# Patient Record
Sex: FEMALE | Race: WHITE | Employment: OTHER | URBAN - METROPOLITAN AREA
[De-identification: names, ages, dates, MRNs, and addresses within clinical notes are randomized per-mention and may not be internally consistent; named-entity substitution may affect disease eponyms.]

---

## 2019-12-07 ENCOUNTER — HOSPITAL ENCOUNTER (OUTPATIENT)
Age: 79
Discharge: HOME | End: 2019-12-07
Payer: MEDICARE

## 2019-12-07 LAB
ALBUMIN SERPL-MCNC: 4 G/DL (ref 3.5–5.2)
ALP BLD-CCNC: 76 U/L (ref 35–104)
ALT SERPL-CCNC: 10 U/L (ref 0–32)
ANION GAP SERPL CALCULATED.3IONS-SCNC: 12 MMOL/L (ref 7–16)
AST SERPL-CCNC: 18 U/L (ref 0–31)
BACTERIA: ABNORMAL /HPF
BASOPHILS ABSOLUTE: 0.03 E9/L (ref 0–0.2)
BASOPHILS RELATIVE PERCENT: 0.4 % (ref 0–2)
BILIRUB SERPL-MCNC: 0.3 MG/DL (ref 0–1.2)
BILIRUBIN URINE: NEGATIVE
BLOOD, URINE: ABNORMAL
BUN BLDV-MCNC: 16 MG/DL (ref 8–23)
CALCIUM SERPL-MCNC: 9.2 MG/DL (ref 8.6–10.2)
CHLORIDE BLD-SCNC: 101 MMOL/L (ref 98–107)
CHOLESTEROL, TOTAL: 167 MG/DL (ref 0–199)
CLARITY: CLEAR
CO2: 27 MMOL/L (ref 22–29)
COLOR: YELLOW
CREAT SERPL-MCNC: 0.7 MG/DL (ref 0.5–1)
EOSINOPHILS ABSOLUTE: 0.16 E9/L (ref 0.05–0.5)
EOSINOPHILS RELATIVE PERCENT: 2.1 % (ref 0–6)
EPITHELIAL CELLS, UA: ABNORMAL /HPF
GFR AFRICAN AMERICAN: >60
GFR NON-AFRICAN AMERICAN: >60 ML/MIN/1.73
GLUCOSE BLD-MCNC: 100 MG/DL (ref 74–99)
GLUCOSE URINE: NEGATIVE MG/DL
HBA1C MFR BLD: 6 % (ref 4–5.6)
HCT VFR BLD CALC: 39.9 % (ref 34–48)
HDLC SERPL-MCNC: 82 MG/DL
HEMOGLOBIN: 12.9 G/DL (ref 11.5–15.5)
IMMATURE GRANULOCYTES #: 0.04 E9/L
IMMATURE GRANULOCYTES %: 0.5 % (ref 0–5)
KETONES, URINE: NEGATIVE MG/DL
LDL CHOLESTEROL CALCULATED: 72 MG/DL (ref 0–99)
LEUKOCYTE ESTERASE, URINE: NEGATIVE
LYMPHOCYTES ABSOLUTE: 2.3 E9/L (ref 1.5–4)
LYMPHOCYTES RELATIVE PERCENT: 30.7 % (ref 20–42)
MCH RBC QN AUTO: 30.1 PG (ref 26–35)
MCHC RBC AUTO-ENTMCNC: 32.3 % (ref 32–34.5)
MCV RBC AUTO: 93.2 FL (ref 80–99.9)
MONOCYTES ABSOLUTE: 0.56 E9/L (ref 0.1–0.95)
MONOCYTES RELATIVE PERCENT: 7.5 % (ref 2–12)
NEUTROPHILS ABSOLUTE: 4.4 E9/L (ref 1.8–7.3)
NEUTROPHILS RELATIVE PERCENT: 58.8 % (ref 43–80)
NITRITE, URINE: NEGATIVE
PDW BLD-RTO: 12.6 FL (ref 11.5–15)
PH UA: 6 (ref 5–9)
PLATELET # BLD: 319 E9/L (ref 130–450)
PMV BLD AUTO: 10 FL (ref 7–12)
POTASSIUM SERPL-SCNC: 4.1 MMOL/L (ref 3.5–5)
PROTEIN UA: NEGATIVE MG/DL
RBC # BLD: 4.28 E12/L (ref 3.5–5.5)
RBC UA: ABNORMAL /HPF (ref 0–2)
SODIUM BLD-SCNC: 140 MMOL/L (ref 132–146)
SPECIFIC GRAVITY UA: 1.01 (ref 1–1.03)
TOTAL PROTEIN: 7.4 G/DL (ref 6.4–8.3)
TRIGL SERPL-MCNC: 65 MG/DL (ref 0–149)
TSH SERPL DL<=0.05 MIU/L-ACNC: 2.04 UIU/ML (ref 0.27–4.2)
UROBILINOGEN, URINE: 0.2 E.U./DL
VITAMIN D 25-HYDROXY: 71 NG/ML (ref 30–100)
VLDLC SERPL CALC-MCNC: 13 MG/DL
WBC # BLD: 7.5 E9/L (ref 4.5–11.5)
WBC UA: ABNORMAL /HPF (ref 0–5)

## 2019-12-07 PROCEDURE — 84443 ASSAY THYROID STIM HORMONE: CPT

## 2019-12-07 PROCEDURE — 80061 LIPID PANEL: CPT

## 2019-12-07 PROCEDURE — 85025 COMPLETE CBC W/AUTO DIFF WBC: CPT

## 2019-12-07 PROCEDURE — 82306 VITAMIN D 25 HYDROXY: CPT

## 2019-12-07 PROCEDURE — 36415 COLL VENOUS BLD VENIPUNCTURE: CPT

## 2019-12-07 PROCEDURE — 81001 URINALYSIS AUTO W/SCOPE: CPT

## 2019-12-07 PROCEDURE — 83036 HEMOGLOBIN GLYCOSYLATED A1C: CPT

## 2019-12-07 PROCEDURE — 80053 COMPREHEN METABOLIC PANEL: CPT

## 2020-11-05 ENCOUNTER — HOSPITAL ENCOUNTER (OUTPATIENT)
Age: 80
Discharge: HOME | End: 2020-11-05
Payer: MEDICARE

## 2020-11-05 LAB
ALBUMIN SERPL-MCNC: 3.8 G/DL (ref 3.5–5.2)
ALP BLD-CCNC: 82 U/L (ref 35–104)
ALT SERPL-CCNC: 11 U/L (ref 0–32)
ANION GAP SERPL CALCULATED.3IONS-SCNC: 10 MMOL/L (ref 7–16)
AST SERPL-CCNC: 21 U/L (ref 0–31)
BASOPHILS ABSOLUTE: 0.04 E9/L (ref 0–0.2)
BASOPHILS RELATIVE PERCENT: 0.6 % (ref 0–2)
BILIRUB SERPL-MCNC: 0.5 MG/DL (ref 0–1.2)
BILIRUBIN URINE: NEGATIVE
BLOOD, URINE: NEGATIVE
BUN BLDV-MCNC: 14 MG/DL (ref 8–23)
CALCIUM SERPL-MCNC: 9.2 MG/DL (ref 8.6–10.2)
CHLORIDE BLD-SCNC: 104 MMOL/L (ref 98–107)
CLARITY: CLEAR
CO2: 27 MMOL/L (ref 22–29)
COLOR: YELLOW
CREAT SERPL-MCNC: 0.6 MG/DL (ref 0.5–1)
EOSINOPHILS ABSOLUTE: 0.16 E9/L (ref 0.05–0.5)
EOSINOPHILS RELATIVE PERCENT: 2.2 % (ref 0–6)
GFR AFRICAN AMERICAN: >60
GFR NON-AFRICAN AMERICAN: >60 ML/MIN/1.73
GLUCOSE FASTING: 121 MG/DL (ref 74–99)
GLUCOSE URINE: NEGATIVE MG/DL
HBA1C MFR BLD: 6.3 % (ref 4–5.6)
HCT VFR BLD CALC: 44.1 % (ref 34–48)
HEMOGLOBIN: 14.3 G/DL (ref 11.5–15.5)
IMMATURE GRANULOCYTES #: 0.03 E9/L
IMMATURE GRANULOCYTES %: 0.4 % (ref 0–5)
KETONES, URINE: NEGATIVE MG/DL
LEUKOCYTE ESTERASE, URINE: NEGATIVE
LYMPHOCYTES ABSOLUTE: 2.5 E9/L (ref 1.5–4)
LYMPHOCYTES RELATIVE PERCENT: 34.6 % (ref 20–42)
MCH RBC QN AUTO: 29.9 PG (ref 26–35)
MCHC RBC AUTO-ENTMCNC: 32.4 % (ref 32–34.5)
MCV RBC AUTO: 92.1 FL (ref 80–99.9)
MONOCYTES ABSOLUTE: 0.55 E9/L (ref 0.1–0.95)
MONOCYTES RELATIVE PERCENT: 7.6 % (ref 2–12)
NEUTROPHILS ABSOLUTE: 3.94 E9/L (ref 1.8–7.3)
NEUTROPHILS RELATIVE PERCENT: 54.6 % (ref 43–80)
NITRITE, URINE: NEGATIVE
PDW BLD-RTO: 12.4 FL (ref 11.5–15)
PH UA: 6 (ref 5–9)
PLATELET # BLD: 327 E9/L (ref 130–450)
PMV BLD AUTO: 10.1 FL (ref 7–12)
POTASSIUM SERPL-SCNC: 4.1 MMOL/L (ref 3.5–5)
PROTEIN UA: NEGATIVE MG/DL
RBC # BLD: 4.79 E12/L (ref 3.5–5.5)
SODIUM BLD-SCNC: 141 MMOL/L (ref 132–146)
SPECIFIC GRAVITY UA: 1.02 (ref 1–1.03)
TOTAL PROTEIN: 7.6 G/DL (ref 6.4–8.3)
TSH SERPL DL<=0.05 MIU/L-ACNC: 1.84 UIU/ML (ref 0.27–4.2)
UROBILINOGEN, URINE: 0.2 E.U./DL
WBC # BLD: 7.2 E9/L (ref 4.5–11.5)

## 2020-11-05 PROCEDURE — 81003 URINALYSIS AUTO W/O SCOPE: CPT

## 2020-11-05 PROCEDURE — 84443 ASSAY THYROID STIM HORMONE: CPT

## 2020-11-05 PROCEDURE — 85025 COMPLETE CBC W/AUTO DIFF WBC: CPT

## 2020-11-05 PROCEDURE — 80053 COMPREHEN METABOLIC PANEL: CPT

## 2020-11-05 PROCEDURE — 83036 HEMOGLOBIN GLYCOSYLATED A1C: CPT

## 2020-11-05 PROCEDURE — 36415 COLL VENOUS BLD VENIPUNCTURE: CPT

## 2020-11-20 ENCOUNTER — HOSPITAL ENCOUNTER (OUTPATIENT)
Dept: ULTRASOUND IMAGING | Age: 80
Discharge: HOME | End: 2020-11-20
Payer: MEDICARE

## 2020-11-20 ENCOUNTER — HOSPITAL ENCOUNTER (OUTPATIENT)
Dept: CT IMAGING | Age: 80
Discharge: HOME | End: 2020-11-20
Payer: MEDICARE

## 2020-11-20 PROCEDURE — 93880 EXTRACRANIAL BILAT STUDY: CPT

## 2020-11-20 PROCEDURE — 70450 CT HEAD/BRAIN W/O DYE: CPT

## 2020-12-11 ENCOUNTER — HOSPITAL ENCOUNTER (OUTPATIENT)
Dept: CT IMAGING | Age: 80
Discharge: HOME | End: 2020-12-11
Payer: MEDICARE

## 2020-12-16 ENCOUNTER — HOSPITAL ENCOUNTER (OUTPATIENT)
Age: 80
Discharge: HOME | End: 2020-12-18
Payer: MEDICARE

## 2020-12-16 PROCEDURE — U0003 INFECTIOUS AGENT DETECTION BY NUCLEIC ACID (DNA OR RNA); SEVERE ACUTE RESPIRATORY SYNDROME CORONAVIRUS 2 (SARS-COV-2) (CORONAVIRUS DISEASE [COVID-19]), AMPLIFIED PROBE TECHNIQUE, MAKING USE OF HIGH THROUGHPUT TECHNOLOGIES AS DESCRIBED BY CMS-2020-01-R: HCPCS

## 2020-12-18 LAB
SARS-COV-2: NOT DETECTED
SOURCE: NORMAL

## 2020-12-21 ENCOUNTER — HOSPITAL ENCOUNTER (OUTPATIENT)
Dept: CT IMAGING | Age: 80
Discharge: HOME | End: 2020-12-21
Payer: MEDICARE

## 2020-12-21 ENCOUNTER — HOSPITAL ENCOUNTER (OUTPATIENT)
Dept: CT IMAGING | Age: 80
End: 2020-12-21
Payer: MEDICARE

## 2020-12-21 ENCOUNTER — HOSPITAL ENCOUNTER (OUTPATIENT)
Dept: INTERVENTIONAL RADIOLOGY/VASCULAR | Age: 80
Discharge: HOME | End: 2020-12-21
Payer: MEDICARE

## 2020-12-21 LAB
INR BLD: NORMAL
PROTIME: NORMAL

## 2020-12-21 PROCEDURE — 70496 CT ANGIOGRAPHY HEAD: CPT

## 2020-12-21 PROCEDURE — 6360000004 HC RX CONTRAST MEDICATION: Performed by: RADIOLOGY

## 2020-12-21 PROCEDURE — 70498 CT ANGIOGRAPHY NECK: CPT

## 2020-12-21 PROCEDURE — 2500000003 HC RX 250 WO HCPCS: Performed by: RADIOLOGY

## 2020-12-21 PROCEDURE — C1894 INTRO/SHEATH, NON-LASER: HCPCS

## 2020-12-21 PROCEDURE — 36573 INSJ PICC RS&I 5 YR+: CPT | Performed by: RADIOLOGY

## 2020-12-21 RX ORDER — LIDOCAINE HYDROCHLORIDE 10 MG/ML
10 INJECTION, SOLUTION INFILTRATION; PERINEURAL ONCE
Status: DISCONTINUED | OUTPATIENT
Start: 2020-12-21 | End: 2020-12-21 | Stop reason: ALTCHOICE

## 2020-12-21 RX ADMIN — IOPAMIDOL 75 ML: 755 INJECTION, SOLUTION INTRAVENOUS at 14:21

## 2020-12-21 RX ADMIN — LIDOCAINE HYDROCHLORIDE 10 ML: 10 INJECTION, SOLUTION INFILTRATION; PERINEURAL at 13:50

## 2020-12-21 ASSESSMENT — PAIN SCALES - GENERAL: PAINLEVEL_OUTOF10: 5

## 2020-12-22 LAB — DIABETIC RETINOPATHY: NEGATIVE

## 2021-01-11 ENCOUNTER — PREP FOR PROCEDURE (OUTPATIENT)
Dept: VASCULAR SURGERY | Age: 81
End: 2021-01-11

## 2021-01-11 DIAGNOSIS — I65.21 CAROTID STENOSIS, ASYMPTOMATIC, RIGHT: Primary | ICD-10-CM

## 2021-01-11 RX ORDER — SODIUM CHLORIDE 9 MG/ML
INJECTION, SOLUTION INTRAVENOUS CONTINUOUS
Status: CANCELLED | OUTPATIENT
Start: 2021-01-11

## 2021-01-11 RX ORDER — SODIUM CHLORIDE 0.9 % (FLUSH) 0.9 %
10 SYRINGE (ML) INJECTION PRN
Status: CANCELLED | OUTPATIENT
Start: 2021-01-11

## 2021-01-11 RX ORDER — SODIUM CHLORIDE 0.9 % (FLUSH) 0.9 %
10 SYRINGE (ML) INJECTION EVERY 12 HOURS SCHEDULED
Status: CANCELLED | OUTPATIENT
Start: 2021-01-11

## 2021-01-15 ENCOUNTER — PREP FOR PROCEDURE (OUTPATIENT)
Dept: VASCULAR SURGERY | Age: 81
End: 2021-01-15

## 2021-01-18 ENCOUNTER — HOSPITAL ENCOUNTER (OUTPATIENT)
Dept: PREADMISSION TESTING | Age: 81
Discharge: HOME | End: 2021-01-18
Payer: MEDICARE

## 2021-01-18 ENCOUNTER — HOSPITAL ENCOUNTER (OUTPATIENT)
Age: 81
Discharge: HOME | End: 2021-01-20
Payer: MEDICARE

## 2021-01-18 ENCOUNTER — HOSPITAL ENCOUNTER (OUTPATIENT)
Dept: GENERAL RADIOLOGY | Age: 81
Discharge: HOME | End: 2021-01-20
Payer: MEDICARE

## 2021-01-18 VITALS
OXYGEN SATURATION: 97 % | TEMPERATURE: 97 F | SYSTOLIC BLOOD PRESSURE: 171 MMHG | BODY MASS INDEX: 35.89 KG/M2 | WEIGHT: 195.02 LBS | DIASTOLIC BLOOD PRESSURE: 72 MMHG | HEIGHT: 62 IN | HEART RATE: 70 BPM | RESPIRATION RATE: 16 BRPM

## 2021-01-18 DIAGNOSIS — I65.21 CAROTID STENOSIS, ASYMPTOMATIC, RIGHT: ICD-10-CM

## 2021-01-18 LAB
ALBUMIN SERPL-MCNC: 3.8 G/DL (ref 3.5–5.2)
ALP BLD-CCNC: 91 U/L (ref 35–104)
ALT SERPL-CCNC: 12 U/L (ref 0–32)
ANION GAP SERPL CALCULATED.3IONS-SCNC: 9 MMOL/L (ref 7–16)
APTT: 30.3 SEC (ref 24.5–35.1)
AST SERPL-CCNC: 20 U/L (ref 0–31)
BILIRUB SERPL-MCNC: 0.3 MG/DL (ref 0–1.2)
BUN BLDV-MCNC: 14 MG/DL (ref 8–23)
CALCIUM SERPL-MCNC: 9.3 MG/DL (ref 8.6–10.2)
CHLORIDE BLD-SCNC: 105 MMOL/L (ref 98–107)
CO2: 25 MMOL/L (ref 22–29)
CREAT SERPL-MCNC: 0.7 MG/DL (ref 0.5–1)
GFR AFRICAN AMERICAN: >60
GFR NON-AFRICAN AMERICAN: >60 ML/MIN/1.73
GLUCOSE BLD-MCNC: 108 MG/DL (ref 74–99)
HCT VFR BLD CALC: 41.4 % (ref 34–48)
HEMOGLOBIN: 13.6 G/DL (ref 11.5–15.5)
INR BLD: 1
MCH RBC QN AUTO: 30.7 PG (ref 26–35)
MCHC RBC AUTO-ENTMCNC: 32.9 % (ref 32–34.5)
MCV RBC AUTO: 93.5 FL (ref 80–99.9)
PDW BLD-RTO: 12.1 FL (ref 11.5–15)
PLATELET # BLD: 312 E9/L (ref 130–450)
PMV BLD AUTO: 10.3 FL (ref 7–12)
POTASSIUM SERPL-SCNC: 4.5 MMOL/L (ref 3.5–5)
PROTHROMBIN TIME: 11.2 SEC (ref 9.3–12.4)
RBC # BLD: 4.43 E12/L (ref 3.5–5.5)
SODIUM BLD-SCNC: 139 MMOL/L (ref 132–146)
TOTAL PROTEIN: 7.3 G/DL (ref 6.4–8.3)
WBC # BLD: 7.5 E9/L (ref 4.5–11.5)

## 2021-01-18 PROCEDURE — 71046 X-RAY EXAM CHEST 2 VIEWS: CPT

## 2021-01-18 PROCEDURE — 80053 COMPREHEN METABOLIC PANEL: CPT

## 2021-01-18 PROCEDURE — 85610 PROTHROMBIN TIME: CPT

## 2021-01-18 PROCEDURE — 85027 COMPLETE CBC AUTOMATED: CPT

## 2021-01-18 PROCEDURE — 36415 COLL VENOUS BLD VENIPUNCTURE: CPT

## 2021-01-18 PROCEDURE — U0003 INFECTIOUS AGENT DETECTION BY NUCLEIC ACID (DNA OR RNA); SEVERE ACUTE RESPIRATORY SYNDROME CORONAVIRUS 2 (SARS-COV-2) (CORONAVIRUS DISEASE [COVID-19]), AMPLIFIED PROBE TECHNIQUE, MAKING USE OF HIGH THROUGHPUT TECHNOLOGIES AS DESCRIBED BY CMS-2020-01-R: HCPCS

## 2021-01-18 PROCEDURE — 85730 THROMBOPLASTIN TIME PARTIAL: CPT

## 2021-01-18 RX ORDER — ASPIRIN 81 MG/1
81 TABLET ORAL DAILY
COMMUNITY

## 2021-01-18 RX ORDER — CLOPIDOGREL BISULFATE 75 MG/1
75 TABLET ORAL DAILY
COMMUNITY
End: 2021-04-27

## 2021-01-18 NOTE — PROGRESS NOTES
[address] [facility]                                                                                                                    PRE OP INSTRUCTIONS FOR  Ludwin Nancy FARRAH Coley        Date: 1/18/2021    Date of surgery: 1/22/21   Arrival Time: 7:30 AM    1. Do not eat or drink anything after midnight prior to surgery. This includes no water, chewing gum, mints or ice chips. 2. Take the following medications with a small sip of water on the morning of Surgery: plavix and aspirin. No BP pill, no supplements or vitamins. 3. Diabetics may take evening dose of insulin but none after midnight. If you feel symptomatic or low blood sugar morning of surgery drink 1-2 ounces of apple juice only. 4. Aspirin, Ibuprofen, Advil, Naproxen, Vitamin E and other Anti-inflammatory products should be stopped  before surgery  as directed by your physician. Take Tylenol only unless instructed otherwise by your surgeon. 5. Check with your Doctor regarding stopping Plavix, Coumadin, Lovenox, Eliquis, Effient, or other blood thinners. 6. Do not smoke,use illicit drugs and do not drink any alcoholic beverages 24 hours prior to surgery. 7. You may brush your teeth the morning of surgery. DO NOT SWALLOW WATER    8. You MUST make arrangements for a responsible adult to take you home after your surgery. You will not be allowed to leave alone or drive yourself home. It is strongly suggested someone stay with you the first 24 hrs. Your surgery will be cancelled if you do not have a ride home. 9. PEDIATRIC PATIENTS ONLY:  A parent/legal guardian must accompany a child scheduled for surgery and plan to stay at the hospital until the child is discharged. Please do not bring other children with you.     10. Please wear simple, loose fitting clothing to the hospital.  Reather Dixons not bring valuables (money, credit cards, checkbooks, etc.) Do not wear any makeup (including no eye makeup) or nail polish on your fingers or toes. 11. DO NOT wear any jewelry or piercings on day of surgery. All body piercing jewelry must be removed. 12. Shower the night before surgery with _x__Antibacterial soap /FIDELIA WIPES________. No creams,lotions or powders. May use deodorant. 13. TOTAL JOINT REPLACEMENT/HYSTERECTOMY PATIENTS ONLY---Remember to bring Blood Bank bracelet to the hospital on the day of surgery. 14. If you have a Living Will and Durable Power of  for Healthcare, please bring in a copy. 15. If appropriate bring crutches, inspirex, WALKER, CANE etc... 12. Notify your Surgeon if you develop any illness between now and surgery time, cough, cold, fever, sore throat, nausea, vomiting, etc.  Please notify your surgeon if you experience dizziness, shortness of breath or blurred vision between now & the time of your surgery. 17. If you have __x_dentures, they will be removed before going to the OR; we will provide you a container. If you wear ___contact lenses or ___glasses, they will be removed; please bring a case for them. 18. To provide excellent care visitors will be limited to 1 in the room at any given time. 19. Please bring picture ID and insurance card. 20. Sleep apnea patients need to bring CPAP AND SETTINGS to hospital on day of surgery. 21. During flu season no children under the age of 15 are permitted in the hospital for the safety of all patients. 22. Other Please check in at the information desk. Wear a mask. Please call AMBULATORY CARE if you have any further questions.    [address] [facility]     [address]

## 2021-01-19 LAB
SARS-COV-2: NOT DETECTED
SOURCE: NORMAL

## 2021-01-21 ENCOUNTER — HOSPITAL ENCOUNTER (OUTPATIENT)
Dept: INFUSION THERAPY | Age: 81
Setting detail: INFUSION SERIES
Discharge: HOME | End: 2021-01-21
Payer: MEDICARE

## 2021-01-21 PROCEDURE — 36410 VNPNXR 3YR/> PHY/QHP DX/THER: CPT

## 2021-01-21 PROCEDURE — 76937 US GUIDE VASCULAR ACCESS: CPT

## 2021-01-21 PROCEDURE — C1751 CATH, INF, PER/CENT/MIDLINE: HCPCS

## 2021-01-21 NOTE — H&P
Coral Coley  1/21/2021      H&P    CHIEF COMPLAINT:  CAROTID STENOSIS  HISTORY OF PRESENT ILLNESS:  Glenn Coley is a 78 y.o.  female     Weight:  186. Past Medical History: She has a past medical history of Cancer ([address] Hx of radiation therapy, Hyperlipidemia, Hypertension, and PONV (postoperative nausea and vomiting). Past Surgical History: She has a past surgical history that includes amputation (Left, 1992); Breast surgery (Right, 2004); Colonoscopy; and Hysterectomy. Allergies: Neosporin [neomycin-polymyxin-gramicidin]    Home Medicines:   Prior to Visit Medications    Medication Sig Taking? Authorizing Provider   clopidogrel (PLAVIX) 75 MG tablet Take 75 mg by mouth daily  Historical Provider, MD   aspirin 81 MG EC tablet Take 81 mg by mouth daily  Historical Provider, MD   BIOTIN PO Take by mouth daily  Historical Provider, MD   LUTEIN PO Take by mouth daily  Historical Provider, MD   MULTIPLE VITAMINS PO Take by mouth daily  Historical Provider, MD   VITAMIN D PO Take by mouth daily  Historical Provider, MD   simvastatin (ZOCOR) 20 MG tablet Take 20 mg by mouth nightly  Historical Provider, MD   lisinopril (PRINIVIL;ZESTRIL) 10 MG tablet Take 10 mg by mouth daily  Historical Provider, MD       Social History:   TOBACCO:   reports that she quit smoking about 39 years ago. She has never used smokeless tobacco.  ETOH:    reports current alcohol use. No family history on file. REVIEW OF SYSTEMS:  Constitutional: negative  Respiratory: negative  Cardiovascular: negative  Gastrointestinal: negative  Musculoskeletal:negative  Behavioral/Psych: negative  All others reviewed, negative    No results for input(s): WBC, HGB, PLT, NA, CL, K, BUN, CREATININE, GLUCOSE, PROTIME, INR, LABALBU, PROT, CALCIUM, MG, PHOS, BILITOT, BILIDIR, LDH, ALKPHOS, AST, ALT in the last 72 hours. Physical exam:   Constitutional: She is oriented to person, place, and time.  She appears well-developed and well-nourished. HENT:   Head: Normocephalic and atraumatic. Right Ear: Hearing and external ear normal.   Left Ear: Hearing and external ear normal.   Nose: Nose normal.   Mouth/Throat: Uvula is midline, oropharynx is clear and moist and mucous membranes are normal.   Eyes: Conjunctivae, EOM and lids are normal. Pupils are equal, round, and reactive to light. Neck: Normal range of motion. Neck supple. Cardiovascular: Normal rate, regular rhythm and normal heart sounds. No murmur heard. Pulmonary/Chest: Effort normal and breath sounds normal.   Abdominal: Soft. Bowel sounds are normal. There is no tenderness. There is no rigidity, no rebound, no guarding and no CVA tenderness. Musculoskeletal: She exhibits no edema. Left bka  Tip of stump has some darker pink skin but no erythema or increased warmth. No red streak. No swelling. No draining wound. Neurological: She is alert and oriented to person, place, and time. She has normal strength. No cranial nerve deficit or sensory deficit. Coordination normal. GCS eye subscore is 4. GCS verbal subscore is 5. GCS motor subscore is 6. Skin: Skin is warm and dry. No abrasion and no rash noted. a    ASSESSMENT: CAROTID STENOSIS    PLAN: RIGHT CAROTID ANGIOGRAM STENT    Signed: Dr. Jayden Bethea M.D.

## 2021-01-22 ENCOUNTER — HOSPITAL ENCOUNTER (OUTPATIENT)
Dept: INTERVENTIONAL RADIOLOGY/VASCULAR | Age: 81
Setting detail: OBSERVATION
Discharge: HOME | End: 2021-01-23
Attending: THORACIC SURGERY (CARDIOTHORACIC VASCULAR SURGERY) | Admitting: THORACIC SURGERY (CARDIOTHORACIC VASCULAR SURGERY)
Payer: MEDICARE

## 2021-01-22 DIAGNOSIS — I65.29 STENOSIS OF CAROTID ARTERY, UNSPECIFIED LATERALITY: ICD-10-CM

## 2021-01-22 DIAGNOSIS — I65.21 CAROTID STENOSIS, SYMPTOMATIC W/O INFARCT, RIGHT: ICD-10-CM

## 2021-01-22 LAB
POC ACT LR: 200 SECONDS
POC ACT LR: 275 SECONDS

## 2021-01-22 PROCEDURE — G0269 OCCLUSIVE DEVICE IN VEIN ART: HCPCS | Performed by: THORACIC SURGERY (CARDIOTHORACIC VASCULAR SURGERY)

## 2021-01-22 PROCEDURE — 6360000002 HC RX W HCPCS: Performed by: THORACIC SURGERY (CARDIOTHORACIC VASCULAR SURGERY)

## 2021-01-22 PROCEDURE — 6370000000 HC RX 637 (ALT 250 FOR IP): Performed by: THORACIC SURGERY (CARDIOTHORACIC VASCULAR SURGERY)

## 2021-01-22 PROCEDURE — 2580000003 HC RX 258: Performed by: THORACIC SURGERY (CARDIOTHORACIC VASCULAR SURGERY)

## 2021-01-22 PROCEDURE — 37215 TRANSCATH STENT CCA W/EPS: CPT | Performed by: THORACIC SURGERY (CARDIOTHORACIC VASCULAR SURGERY)

## 2021-01-22 PROCEDURE — C1769 GUIDE WIRE: HCPCS

## 2021-01-22 PROCEDURE — G0379 DIRECT REFER HOSPITAL OBSERV: HCPCS

## 2021-01-22 PROCEDURE — 6360000004 HC RX CONTRAST MEDICATION: Performed by: THORACIC SURGERY (CARDIOTHORACIC VASCULAR SURGERY)

## 2021-01-22 PROCEDURE — G0378 HOSPITAL OBSERVATION PER HR: HCPCS

## 2021-01-22 PROCEDURE — 96374 THER/PROPH/DIAG INJ IV PUSH: CPT

## 2021-01-22 PROCEDURE — 85347 COAGULATION TIME ACTIVATED: CPT

## 2021-01-22 RX ORDER — SODIUM CHLORIDE 0.9 % (FLUSH) 0.9 %
10 SYRINGE (ML) INJECTION EVERY 12 HOURS SCHEDULED
Status: DISCONTINUED | OUTPATIENT
Start: 2021-01-22 | End: 2021-01-22

## 2021-01-22 RX ORDER — HEPARIN SODIUM 5000 [USP'U]/ML
INJECTION, SOLUTION INTRAVENOUS; SUBCUTANEOUS
Status: COMPLETED | OUTPATIENT
Start: 2021-01-22 | End: 2021-01-22

## 2021-01-22 RX ORDER — IODIXANOL 320 MG/ML
70 INJECTION, SOLUTION INTRAVASCULAR
Status: COMPLETED | OUTPATIENT
Start: 2021-01-22 | End: 2021-01-22

## 2021-01-22 RX ORDER — HEPARIN SODIUM (PORCINE) LOCK FLUSH IV SOLN 100 UNIT/ML 100 UNIT/ML
1 SOLUTION INTRAVENOUS PRN
Status: DISCONTINUED | OUTPATIENT
Start: 2021-01-22 | End: 2021-01-23 | Stop reason: HOSPADM

## 2021-01-22 RX ORDER — HEPARIN SODIUM (PORCINE) LOCK FLUSH IV SOLN 100 UNIT/ML 100 UNIT/ML
1 SOLUTION INTRAVENOUS EVERY 12 HOURS SCHEDULED
Status: DISCONTINUED | OUTPATIENT
Start: 2021-01-22 | End: 2021-01-23 | Stop reason: HOSPADM

## 2021-01-22 RX ORDER — SODIUM CHLORIDE 9 MG/ML
INJECTION, SOLUTION INTRAVENOUS CONTINUOUS
Status: DISCONTINUED | OUTPATIENT
Start: 2021-01-22 | End: 2021-01-23 | Stop reason: HOSPADM

## 2021-01-22 RX ORDER — CLOPIDOGREL BISULFATE 75 MG/1
75 TABLET ORAL DAILY
Status: DISCONTINUED | OUTPATIENT
Start: 2021-01-22 | End: 2021-01-23 | Stop reason: HOSPADM

## 2021-01-22 RX ORDER — ASPIRIN 81 MG/1
81 TABLET ORAL DAILY
Status: DISCONTINUED | OUTPATIENT
Start: 2021-01-23 | End: 2021-01-23 | Stop reason: HOSPADM

## 2021-01-22 RX ORDER — SODIUM CHLORIDE 0.9 % (FLUSH) 0.9 %
10 SYRINGE (ML) INJECTION PRN
Status: DISCONTINUED | OUTPATIENT
Start: 2021-01-22 | End: 2021-01-22

## 2021-01-22 RX ORDER — ONDANSETRON 2 MG/ML
4 INJECTION INTRAMUSCULAR; INTRAVENOUS ONCE
Status: COMPLETED | OUTPATIENT
Start: 2021-01-22 | End: 2021-01-22

## 2021-01-22 RX ORDER — ACETAMINOPHEN 325 MG/1
650 TABLET ORAL EVERY 4 HOURS PRN
Status: DISCONTINUED | OUTPATIENT
Start: 2021-01-22 | End: 2021-01-23 | Stop reason: HOSPADM

## 2021-01-22 RX ORDER — ATROPINE SULFATE 0.1 MG/ML
INJECTION INTRAVENOUS
Status: COMPLETED | OUTPATIENT
Start: 2021-01-22 | End: 2021-01-22

## 2021-01-22 RX ORDER — SODIUM CHLORIDE 0.9 % (FLUSH) 0.9 %
10 SYRINGE (ML) INJECTION PRN
Status: DISCONTINUED | OUTPATIENT
Start: 2021-01-22 | End: 2021-01-23 | Stop reason: HOSPADM

## 2021-01-22 RX ORDER — CEFAZOLIN SODIUM 1 G/50ML
1 SOLUTION INTRAVENOUS
Status: DISCONTINUED | OUTPATIENT
Start: 2021-01-22 | End: 2021-01-22

## 2021-01-22 RX ORDER — SODIUM CHLORIDE 9 MG/ML
INJECTION, SOLUTION INTRAVENOUS CONTINUOUS
Status: DISCONTINUED | OUTPATIENT
Start: 2021-01-22 | End: 2021-01-22

## 2021-01-22 RX ADMIN — HEPARIN SODIUM 7000 UNITS: 5000 INJECTION, SOLUTION INTRAVENOUS; SUBCUTANEOUS at 08:02

## 2021-01-22 RX ADMIN — ATROPINE SULFATE 0.5 MG: 0.1 INJECTION, SOLUTION INTRAVENOUS at 08:28

## 2021-01-22 RX ADMIN — IODIXANOL 70 ML: 320 INJECTION, SOLUTION INTRAVASCULAR at 08:43

## 2021-01-22 RX ADMIN — ONDANSETRON 4 MG: 2 INJECTION INTRAMUSCULAR; INTRAVENOUS at 12:41

## 2021-01-22 RX ADMIN — SODIUM CHLORIDE: 9 INJECTION, SOLUTION INTRAVENOUS at 19:00

## 2021-01-22 RX ADMIN — ACETAMINOPHEN 650 MG: 325 TABLET, FILM COATED ORAL at 12:41

## 2021-01-22 RX ADMIN — SODIUM CHLORIDE: 9 INJECTION, SOLUTION INTRAVENOUS at 09:55

## 2021-01-22 RX ADMIN — HEPARIN SODIUM 1000 UNITS: 5000 INJECTION, SOLUTION INTRAVENOUS; SUBCUTANEOUS at 08:21

## 2021-01-22 ASSESSMENT — PAIN SCALES - GENERAL
PAINLEVEL_OUTOF10: 0
PAINLEVEL_OUTOF10: 0

## 2021-01-22 NOTE — OP NOTE
[address]                                OPERATIVE REPORT    PATIENT NAME: Lorenzo Rosenbaum               :        1940  MED REC NO:   [id number]                            ROOM:  ACCOUNT NO:   [de-identified]                           ADMIT DATE: 2021  PROVIDER:     Shiv Ayoub MD    DATE OF PROCEDURE:  2021    PREOPERATIVE DIAGNOSIS:  80% right ICA severe stenosis, symptomatic. POSTOPERATIVE DIAGNOSIS:  80% right ICA severe stenosis, symptomatic. OPERATIVE PROCEDURE:  Right common femoral artery to right carotid  catheterization, right carotid and cerebral angiography, NAV6 filter  right ICA, predilation balloon angioplasty right ICA with 5 x 2 Viatrac  balloon, post balloon angioplasty, right carotid angiogram, stent  placement right distal common carotid artery, proximal internal carotid  artery with 8 x 6 x 30 Xact stent, post dilation balloon angioplasty  right ICA with 6 x 2 Viatrac balloon, retrieval of NAV6 filter right  ICA, completion right carotid and cerebral angiography, right femoral  angiogram, ProGlide closure right common femoral artery access site. SURGEON:  Shiv Ayoub MD    ANESTHESIA:  1% lidocaine. EBL:  Less than 10 mL. COMPLICATIONS:  Nil. INDICATIONS:  The patient is an 78-year-old  female with  symptomatic right carotid stenosis. The patient had two episodes of  blacking out. The CTA of the neck shows severe proximal ICA stenosis. By my estimate this lesion is at least 80% stenosis. The patient is  brought to the angio suite for carotid intervention. Risks and benefits  were explained. The patient and family understand and agreed to  proceed.     OPERATIVE NOTE:  The patient was brought to the angio suite at [facility].  After the patient was placed under conscious sedation protocol, the patient was prepped and draped in usual sterile fashion. Right groin was anesthetized with 1% lidocaine. Right common femoral  artery is accessed in retrograde fashion using micropuncture kit. A  4-Uzbek sheath was placed and subsized over 0.035 stiff angled  Glidewire to a 6-Uzbek shuttle sheath that is passed up into the aortic  arch. With the VTK catheter, we selectively catheterized the right  distal common carotid artery. Telescoped our sheath into the distal  right common carotid artery and performed the right carotid and cerebral  angiography. The findings are confirmed. The patient is heparinized. The NAV6 filter was deployed in the right ICA. Over the filter wire,  predilation balloon angioplasty of the right proximal ICA was performed  with 5 x 2 Viatrac balloon at 8 atmospheres pressure for 2 seconds. The  balloon was deflated and removed. The patient is stable. Post balloon  angioplasty angiography shows severe remaining stenosis. Stenosis in  proximal ICA which was stented with a 8 x 6 x 30 Xact stent and post  dilated with a 6 x 2 Viatrac balloon at 8 atmospheres pressure for 2  seconds again. The patient tolerated this as well and was  hemodynamically and neurologically stable. Then the NAV6 filter was  retrieved with a retrieval catheter from the right ICA without any  incidence. Completion right carotid and cerebral angiography shows  complete resolution. It shows improvement in the stenosis. There is  mild proximal ICA stenosis that is remaining that is not hemodynamically  significant. There is brisk flow of the ICA into the intracranial  vessels. No complications noted. The sheath is pulled down to the  right groin and the right iliac artery and we performed the right  femoral angiogram and we closed the access site with ProGlide with good  hemostasis.   The patient tolerated the procedure well, was hemodynamically stable. At termination, neurologically stable. There  is adequate hemostasis in the right groin. Pedal pulses are present and  the patient left the angio suite in stable condition.         Elliot Silverman MD    D: 01/22/2021 8:56:28       T: 01/22/2021 9:05:55     [email or web address]  Job#: [id number]     Doc#: [id number]    CC:

## 2021-01-22 NOTE — CONSULTS
01/18/2021    MCV 93.5 01/18/2021    MCH 30.7 01/18/2021    MCHC 32.9 01/18/2021    RDW 12.1 01/18/2021     01/18/2021    MPV 10.3 01/18/2021     CMP:    Lab Results   Component Value Date     01/18/2021    K 4.5 01/18/2021     01/18/2021    CO2 25 01/18/2021    BUN 14 01/18/2021    CREATININE 0.7 01/18/2021    GFRAA >60 01/18/2021    LABGLOM >60 01/18/2021    GLUCOSE 108 01/18/2021    PROT 7.3 01/18/2021    LABALBU 3.8 01/18/2021    CALCIUM 9.3 01/18/2021    BILITOT 0.3 01/18/2021    ALKPHOS 91 01/18/2021    AST 20 01/18/2021    ALT 12 01/18/2021         Assessment:  TIA  Right carotid artery stenosis severe  Right carotid stenting  Hypertension  Controlled type 2 diabetes mellitus    Active Problems:    Carotid stenosis, symptomatic w/o infarct, right  Resolved Problems:    * No resolved hospital problems. *        Plan:  Monitor in ICU  Postop care    Active Orders   Lab    APTT     Frequency: Tomorrow AM     Number of Occurrences: 1 Occurrences    Basic Metabolic Panel     Frequency: Tomorrow AM     Number of Occurrences: 1 Occurrences    CBC     Frequency: Tomorrow AM     Number of Occurrences: 1 Occurrences    Protime-INR     Frequency: Tomorrow AM     Number of Occurrences: 1 Occurrences   Diet    DIET GENERAL;     Frequency: Effective Now     Number of Occurrences: Until Specified   Nursing    Bedrest     Frequency: Until Discontinued     Number of Occurrences: Until Specified     Order Comments: Bedrest x 24 hours. Keep HOB elevated 30 degrees. Catheter care     Frequency: Until Discontinued     Number of Occurrences: Until Specified     Order Comments: 1) Follow hospital IV therapy guidelines for catheter care. 2) No blood pressure in arm with catheter in place 3) Elevate and apply warm packs to arm with catheter in place PRN for discomfort.  4) If patient experiences pain, swelling, redness, warmth in arm with catheter, measure and record arm circumference, compare measurement to initial measure, report to provider if greater than 3 cm difference      Misc nursing order (specify)     Frequency: Daily     Number of Occurrences: Until Specified     Order Comments: Notify physician if:  1)  Any change in neuro status  2) if systolic BP less than 44ZIFA or greater than 160mmHg  3) If extremity is numb or cool. Misc nursing order (specify)     Frequency: Daily     Number of Occurrences: Until Specified     Order Comments: If bleeding or swelling is noted, apply pressure to site and notify physician      Misc nursing order (specify)     Frequency: PRN     Number of Occurrences: Until Specified     Order Comments: If unable to void 4 hours post-procedure, may straight prn      Misc nursing order (specify)     Frequency: 1 Time     Number of Occurrences: 1 Occurrences     Order Comments: Cardiac Care:   Notify physician STAT  Lidocaine 100mg IV push STAT  Follow ACLS guidelines for ventricular arrhythmias      Misc nursing order (specify)     Frequency: Daily     Number of Occurrences: Until Specified     Order Comments: For sustained, symptomatic bradycardia (Pulse less than 50 bpm)  Notify physician STAT  Atropine 0.5MG iv PUSH stat -may repeat times 1 dose. Follow ACLS guidelines for bradycardia      Misc nursing order (specify)     Frequency: 1 Time     Number of Occurrences: 1 Occurrences     Order Comments: For chest pain:  Notify physician STAT  Obtain EKG STAT  If systolic BP greater than 30RJXF, give Nitroglycerin 0.4mg SL-may repeat times 1 dose      Misc nursing order (specify)     Frequency: Daily     Number of Occurrences: Until Specified     Order Comments: For SBP less than 90mmHg  1) Notify physician STAT  2) IV fluid bolus of 500ml of .9% Normal Saline  3) If BP remains less than 90mmHg after fluid bolus, start Dopamine drip to maintain systolic BP greater than 99CGIW, THEN WEAN AS ABLE.     For systolic BP greater than 166EVSL:  1)  Vasotec 1.25mg IV q 6 hr prn- may repeat dose times 1 in 2 hours if SBP remains greater than 891NPYF  2) If systolic BP continues greater than 160mmHg , add Nitroglycerin drip to maximum dose of 20 mcg/min, then wean as able. 3)  If SBP continues greater than 160mmHg, add Nipride drip to maximum dose of 10mcg/kg/min, then wean as able.       Notify physician (specify)     Frequency: Until Discontinued     Number of Occurrences: Until Specified     Order Comments: If unable to place Midline Catheter for further orders      Place INT pneumatic compression device     Frequency: Until Discontinued     Number of Occurrences: Until Specified    Strict intake and output     Frequency: Daily     Number of Occurrences: Until Specified   Consult    Inpatient consult to Primary Care Provider     Frequency: 1 Time     Number of Occurrences: 1 Occurrences   Medications    0.9 % sodium chloride infusion     Frequency: Continuous     Route: Intravenous    acetaminophen (TYLENOL) tablet 650 mg     Frequency: Q4H PRN     Dose: 650 mg     Route: Oral    aspirin EC tablet 81 mg     Frequency: Daily     Dose: 81 mg     Route: Oral    clopidogrel (PLAVIX) tablet 75 mg     Frequency: Daily     Dose: 75 mg     Route: Oral    heparin flush 100 UNIT/ML injection 100 Units     Frequency: Q12H [address]     Dose: 1 mL     Route: Intravenous    heparin flush 100 UNIT/ML injection 100 Units     Frequency: PRN     Dose: 1 mL     Route: Intracatheter    sodium chloride flush 0.9 % injection 10 mL     Frequency: PRN     Dose: 10 mL     Route: Intravenous        Lu Nevarez  1/22/2021

## 2021-01-23 VITALS
SYSTOLIC BLOOD PRESSURE: 111 MMHG | BODY MASS INDEX: 35.94 KG/M2 | RESPIRATION RATE: 16 BRPM | HEIGHT: 62 IN | WEIGHT: 195.3 LBS | DIASTOLIC BLOOD PRESSURE: 52 MMHG | TEMPERATURE: 98.5 F | OXYGEN SATURATION: 91 % | HEART RATE: 68 BPM

## 2021-01-23 LAB
ANION GAP SERPL CALCULATED.3IONS-SCNC: 6 MMOL/L (ref 7–16)
APTT: 29.1 SEC (ref 24.5–35.1)
BUN BLDV-MCNC: 11 MG/DL (ref 8–23)
CALCIUM SERPL-MCNC: 8.4 MG/DL (ref 8.6–10.2)
CHLORIDE BLD-SCNC: 108 MMOL/L (ref 98–107)
CO2: 25 MMOL/L (ref 22–29)
CREAT SERPL-MCNC: 0.6 MG/DL (ref 0.5–1)
GFR AFRICAN AMERICAN: >60
GFR NON-AFRICAN AMERICAN: >60 ML/MIN/1.73
GLUCOSE BLD-MCNC: 97 MG/DL (ref 74–99)
HCT VFR BLD CALC: 33.8 % (ref 34–48)
HEMOGLOBIN: 10.6 G/DL (ref 11.5–15.5)
INR BLD: 1
MCH RBC QN AUTO: 30.1 PG (ref 26–35)
MCHC RBC AUTO-ENTMCNC: 31.4 % (ref 32–34.5)
MCV RBC AUTO: 96 FL (ref 80–99.9)
PDW BLD-RTO: 12.6 FL (ref 11.5–15)
PLATELET # BLD: 242 E9/L (ref 130–450)
PMV BLD AUTO: 10.2 FL (ref 7–12)
POTASSIUM SERPL-SCNC: 3.7 MMOL/L (ref 3.5–5)
PROTHROMBIN TIME: 11.5 SEC (ref 9.3–12.4)
RBC # BLD: 3.52 E12/L (ref 3.5–5.5)
SODIUM BLD-SCNC: 139 MMOL/L (ref 132–146)
WBC # BLD: 7.3 E9/L (ref 4.5–11.5)

## 2021-01-23 PROCEDURE — 80048 BASIC METABOLIC PNL TOTAL CA: CPT

## 2021-01-23 PROCEDURE — 6370000000 HC RX 637 (ALT 250 FOR IP): Performed by: THORACIC SURGERY (CARDIOTHORACIC VASCULAR SURGERY)

## 2021-01-23 PROCEDURE — 85027 COMPLETE CBC AUTOMATED: CPT

## 2021-01-23 PROCEDURE — 85730 THROMBOPLASTIN TIME PARTIAL: CPT

## 2021-01-23 PROCEDURE — 85610 PROTHROMBIN TIME: CPT

## 2021-01-23 PROCEDURE — 2580000003 HC RX 258: Performed by: THORACIC SURGERY (CARDIOTHORACIC VASCULAR SURGERY)

## 2021-01-23 PROCEDURE — G0378 HOSPITAL OBSERVATION PER HR: HCPCS

## 2021-01-23 RX ADMIN — SODIUM CHLORIDE: 9 INJECTION, SOLUTION INTRAVENOUS at 04:26

## 2021-01-23 RX ADMIN — ASPIRIN 81 MG: 81 TABLET, COATED ORAL at 08:47

## 2021-01-23 RX ADMIN — CLOPIDOGREL BISULFATE 75 MG: 75 TABLET ORAL at 08:45

## 2021-01-23 ASSESSMENT — PAIN SCALES - GENERAL: PAINLEVEL_OUTOF10: 0

## 2021-01-23 NOTE — PLAN OF CARE
Problem: Pain:  Goal: Pain level will decrease  Description: Pain level will decrease  1/23/2021 0959 by Eldon Westbrook RN  Outcome: Met This Shift  1/23/2021 0655 by Tania Peraza RN  Outcome: Met This Shift  Goal: Control of acute pain  Description: Control of acute pain  1/23/2021 0959 by Eldon Westbrook RN  Outcome: Met This Shift  1/23/2021 0655 by Tania Peraza RN  Outcome: Met This Shift  Goal: Control of chronic pain  Description: Control of chronic pain  Outcome: Met This Shift     Problem: Activity:  Goal: Ability to return to normal activity level will improve  Description: Ability to return to normal activity level will improve  1/23/2021 0959 by Eldon Westbrook RN  Outcome: Met This Shift  1/23/2021 0655 by Tania Peraza RN  Outcome: Met This Shift     Problem:  Bowel/Gastric:  Goal: Gastrointestinal status for postoperative course will improve  Description: Gastrointestinal status for postoperative course will improve  1/23/2021 0959 by Eldon Westbrook RN  Outcome: Met This Shift  1/23/2021 0655 by Tania Peraza RN  Outcome: Met This Shift     Problem: Health Behavior:  Goal: Identification of resources available to assist in meeting health care needs will improve  Description: Identification of resources available to assist in meeting health care needs will improve  Outcome: Met This Shift     Problem: Respiratory:  Goal: Ability to achieve and maintain a regular respiratory rate will improve  Description: Ability to achieve and maintain a regular respiratory rate will improve  Outcome: Met This Shift     Problem: Sensory:  Goal: General experience of comfort will improve  Description: General experience of comfort will improve  Outcome: Met This Shift

## 2021-01-23 NOTE — PROGRESS NOTES
Pt discharge instructions were given to patient and son with all questions answered. Stent card with patient. All belongings with patient. No s/s of distress or complications. Waiting for transport at this time.

## 2021-01-23 NOTE — DISCHARGE INSTR - ACTIVITY
Do not drive for 1 week. Do not bathe, use hot tub, or sit in standing water until right groin incision healed.

## 2021-01-23 NOTE — PLAN OF CARE
Problem: Pain:  Goal: Pain level will decrease  Description: Pain level will decrease  Outcome: Met This Shift  Goal: Control of acute pain  Description: Control of acute pain  Outcome: Met This Shift     Problem: Activity:  Goal: Ability to return to normal activity level will improve  Description: Ability to return to normal activity level will improve  Outcome: Met This Shift     Problem:  Bowel/Gastric:  Goal: Gastrointestinal status for postoperative course will improve  Description: Gastrointestinal status for postoperative course will improve  Outcome: Met This Shift

## 2021-01-23 NOTE — PROGRESS NOTES
No complaint  Sitting up in chair  Family at bedside  avss  No neuro deficit  Cor rrr  Chest cta  abdo benign  No groin hematoma  Pedal pulses+  Labs ok  Wants to go home  F/u 1 wk pmd and myself

## 2021-04-09 VITALS
TEMPERATURE: 96.6 F | SYSTOLIC BLOOD PRESSURE: 130 MMHG | RESPIRATION RATE: 16 BRPM | OXYGEN SATURATION: 91 % | HEIGHT: 62 IN | HEART RATE: 77 BPM | BODY MASS INDEX: 35.88 KG/M2 | WEIGHT: 195 LBS | DIASTOLIC BLOOD PRESSURE: 82 MMHG

## 2021-04-09 RX ORDER — AMLODIPINE BESYLATE 5 MG/1
5 TABLET ORAL DAILY
COMMUNITY
End: 2021-04-27 | Stop reason: SDUPTHER

## 2021-04-09 RX ORDER — LISINOPRIL 20 MG/1
20 TABLET ORAL DAILY
COMMUNITY
End: 2021-04-27 | Stop reason: SDUPTHER

## 2021-04-27 ENCOUNTER — OFFICE VISIT (OUTPATIENT)
Dept: PRIMARY CARE CLINIC | Age: 81
End: 2021-04-27
Payer: MEDICARE

## 2021-04-27 VITALS
SYSTOLIC BLOOD PRESSURE: 120 MMHG | HEART RATE: 70 BPM | WEIGHT: 188 LBS | HEIGHT: 62 IN | BODY MASS INDEX: 34.6 KG/M2 | TEMPERATURE: 97.3 F | OXYGEN SATURATION: 95 % | DIASTOLIC BLOOD PRESSURE: 64 MMHG

## 2021-04-27 DIAGNOSIS — I48.0 PAROXYSMAL ATRIAL FIBRILLATION (HCC): ICD-10-CM

## 2021-04-27 DIAGNOSIS — I10 HYPERTENSION, ESSENTIAL: ICD-10-CM

## 2021-04-27 DIAGNOSIS — E78.2 HYPERLIPIDEMIA, MIXED: ICD-10-CM

## 2021-04-27 DIAGNOSIS — Z85.3 HISTORY OF BREAST CANCER IN FEMALE: ICD-10-CM

## 2021-04-27 DIAGNOSIS — E11.9 CONTROLLED TYPE 2 DIABETES MELLITUS WITHOUT COMPLICATION, WITHOUT LONG-TERM CURRENT USE OF INSULIN (HCC): Primary | ICD-10-CM

## 2021-04-27 DIAGNOSIS — G45.9 TIA (TRANSIENT ISCHEMIC ATTACK): ICD-10-CM

## 2021-04-27 PROCEDURE — 99213 OFFICE O/P EST LOW 20 MIN: CPT | Performed by: INTERNAL MEDICINE

## 2021-04-27 RX ORDER — LISINOPRIL 20 MG/1
20 TABLET ORAL DAILY
Qty: 90 TABLET | Refills: 0 | Status: SHIPPED
Start: 2021-04-27 | End: 2021-06-15 | Stop reason: SDUPTHER

## 2021-04-27 RX ORDER — AMLODIPINE BESYLATE 5 MG/1
5 TABLET ORAL DAILY
Qty: 90 TABLET | Refills: 0 | Status: SHIPPED
Start: 2021-04-27 | End: 2021-06-15 | Stop reason: SDUPTHER

## 2021-04-27 RX ORDER — SIMVASTATIN 20 MG
20 TABLET ORAL NIGHTLY
Qty: 90 TABLET | Refills: 0 | Status: SHIPPED
Start: 2021-04-27 | End: 2021-06-15 | Stop reason: SDUPTHER

## 2021-04-27 ASSESSMENT — PATIENT HEALTH QUESTIONNAIRE - PHQ9
1. LITTLE INTEREST OR PLEASURE IN DOING THINGS: 0
SUM OF ALL RESPONSES TO PHQ QUESTIONS 1-9: 0
SUM OF ALL RESPONSES TO PHQ QUESTIONS 1-9: 0
SUM OF ALL RESPONSES TO PHQ9 QUESTIONS 1 & 2: 0

## 2021-04-27 NOTE — PROGRESS NOTES
Chief Complaint   Patient presents with    Hyperlipidemia     f/u visit from 3 months ago. no labs or tests since last viisit    Hypertension       HPI:  Patient is here for follow-up . Feels well has no more blackout spells compliant on medications no heart palpitation no shortness of breath no chest pain. Had no recent lab work. Patient had right carotid endarterectomy. She was also found to be in atrial fibrillation by cardiology was placed on Eliquis 5 mg tablet twice a day. No skin bruises no hematuria no blood in the stool no epistaxis    Past Medical History, Surgical History, and Family History has been reviewed and updated.     Review of Systems:  Constitutional:  No fever, no fatigue, no chills, no headaches, no weight change  Dermatology:  No rash, no mole, no dry or sensitive skin  ENT:  No cough, no sore throat, no sinus pain, no runny nose, no ear pain  Cardiology:  No chest pain, no palpitations, no leg edema, no shortness of breath, no PND  Gastroenterology:  No dysphagia, no abdominal pain, no nausea, no vomiting, no constipation, no diarrhea, no heartburn  Musculoskeletal:  No joint pain, no leg cramps, no back pain, no muscle aches  Respiratory:  No shortness of breath, no orthopnea, no wheezing, no ASENCIO, no hemoptysis  Urology:  No blood in the urine, no urinary frequency, no urinary incontinence, no urinary urgency, no nocturia, no dysuria    Vitals:    04/27/21 1315   BP: 120/64   Site: Left Upper Arm   Position: Sitting   Cuff Size: Medium Adult   Pulse: 70   Temp: 97.3 °F (36.3 °C)   SpO2: 95%   Weight: 188 lb (85.3 kg)   Height: 5' 2\" (1.575 m)       General:  Patient alert and oriented x 3, NAD, pleasant  HEENT:  Atraumatic, normocephalic, PERRLA, EOMI, clear conjunctiva, TMs clear, nose-clear, throat - no erythema  Neck:  Supple, no goiter, no carotid bruits, no LAD  Lungs:  CTA   Heart:  RRR, +  pansystolic murmur 1/6 at the apex no thrills or rubs no gallop  Abdomen:  Soft/nt/nd, + Results valid for patients 18 years and older. GFR    Date Value Ref Range Status   01/23/2021 >60  Final        No results found. Assessment/Plan:      Outpatient Encounter Medications as of 4/27/2021   Medication Sig Dispense Refill    amLODIPine (NORVASC) 5 MG tablet Take 1 tablet by mouth daily 90 tablet 0    lisinopril (PRINIVIL;ZESTRIL) 20 MG tablet Take 1 tablet by mouth daily 90 tablet 0    simvastatin (ZOCOR) 20 MG tablet Take 1 tablet by mouth nightly 90 tablet 0    Calcium Carbonate (CALCIUM 600 PO) Take 1 capsule by mouth daily       apixaban (ELIQUIS) 5 MG TABS tablet Take 5 mg by mouth 2 times daily      vitamin D 25 MCG (1000 UT) CAPS Take 1 capsule by mouth daily      aspirin 81 MG EC tablet Take 81 mg by mouth daily      BIOTIN PO Take 1,000 mcg by mouth daily       LUTEIN PO Take by mouth daily      MULTIPLE VITAMINS PO Take by mouth daily      [DISCONTINUED] amLODIPine (NORVASC) 5 MG tablet Take 5 mg by mouth daily      [DISCONTINUED] lisinopril (PRINIVIL;ZESTRIL) 20 MG tablet Take 20 mg by mouth daily      VITAMIN D PO Take by mouth daily      [DISCONTINUED] clopidogrel (PLAVIX) 75 MG tablet Take 75 mg by mouth daily      [DISCONTINUED] simvastatin (ZOCOR) 20 MG tablet Take 20 mg by mouth nightly       No facility-administered encounter medications on file as of 4/27/2021. Janneth Salcido was seen today for hyperlipidemia and hypertension. Diagnoses and all orders for this visit:    Controlled type 2 diabetes mellitus without complication, without long-term current use of insulin [facility]  -     Comprehensive Metabolic Panel; Future  -     Urinalysis; Future  -     Lipid Panel; Future  -     Hemoglobin A1C; Future  -     Microalbumin / Creatinine Urine Ratio; Future    Paroxysmal atrial fibrillation (HCC)    Hypertension, essential    TIA (transient ischemic attack)    Hyperlipidemia, mixed  -     Lipid Panel;  Future    History of breast cancer in female Other orders  -     amLODIPine (NORVASC) 5 MG tablet; Take 1 tablet by mouth daily  -     lisinopril (PRINIVIL;ZESTRIL) 20 MG tablet; Take 1 tablet by mouth daily  -     simvastatin (ZOCOR) 20 MG tablet;  Take 1 tablet by mouth nightly         Patient Instructions   Patient was advised to continue all current medications   get blood work done before next visit in 2-month             Emmett Campbell MD   4/27/21

## 2021-06-15 ENCOUNTER — OFFICE VISIT (OUTPATIENT)
Dept: PRIMARY CARE CLINIC | Age: 81
End: 2021-06-15
Payer: MEDICARE

## 2021-06-15 VITALS
DIASTOLIC BLOOD PRESSURE: 70 MMHG | SYSTOLIC BLOOD PRESSURE: 136 MMHG | HEIGHT: 62 IN | WEIGHT: 188.7 LBS | HEART RATE: 69 BPM | TEMPERATURE: 97.5 F | BODY MASS INDEX: 34.72 KG/M2 | OXYGEN SATURATION: 97 %

## 2021-06-15 DIAGNOSIS — H81.10 BENIGN PAROXYSMAL POSITIONAL VERTIGO, UNSPECIFIED LATERALITY: ICD-10-CM

## 2021-06-15 DIAGNOSIS — I10 HYPERTENSION, ESSENTIAL: Primary | ICD-10-CM

## 2021-06-15 DIAGNOSIS — E11.9 CONTROLLED TYPE 2 DIABETES MELLITUS WITHOUT COMPLICATION, WITHOUT LONG-TERM CURRENT USE OF INSULIN (HCC): ICD-10-CM

## 2021-06-15 DIAGNOSIS — Z85.3 HISTORY OF BREAST CANCER IN FEMALE: ICD-10-CM

## 2021-06-15 DIAGNOSIS — E78.2 HYPERLIPIDEMIA, MIXED: ICD-10-CM

## 2021-06-15 PROCEDURE — 99214 OFFICE O/P EST MOD 30 MIN: CPT | Performed by: INTERNAL MEDICINE

## 2021-06-15 RX ORDER — LISINOPRIL 20 MG/1
20 TABLET ORAL DAILY
Qty: 90 TABLET | Refills: 0 | Status: SHIPPED
Start: 2021-06-15 | End: 2021-08-17 | Stop reason: SDUPTHER

## 2021-06-15 RX ORDER — AMLODIPINE BESYLATE 5 MG/1
5 TABLET ORAL DAILY
Qty: 90 TABLET | Refills: 0 | Status: SHIPPED
Start: 2021-06-15 | End: 2021-08-17 | Stop reason: SDUPTHER

## 2021-06-15 RX ORDER — SIMVASTATIN 20 MG
20 TABLET ORAL NIGHTLY
Qty: 90 TABLET | Refills: 0 | Status: SHIPPED
Start: 2021-06-15 | End: 2021-08-17 | Stop reason: SDUPTHER

## 2021-06-15 NOTE — PATIENT INSTRUCTIONS
Patient was advised to do Epley maneuver in the morning and bedtime and as needed during the day  Continue prescribed medications  Do lab work before next visit in 3 months

## 2021-06-15 NOTE — PROGRESS NOTES
Chief Complaint   Patient presents with    Hypertension     f/i visit. labwork drawn yesterday at [facility]. calling for results    Other     c/o dizzy spells over last 3 weeks when getting up in morning       HPI:  Patient is here for follow-up. Feels generally well except occasionally she gets a dizzy spell when she gets up in the morning she feels room spinning around has to rest for a few minutes it was away no chest discomfort no heart palpitation no shortness of breath. Patient denied any episodes of loss of memory or loss of consciousness  Compliant on medications    Past Medical History, Surgical History, and Family History has been reviewed and updated.     Review of Systems:  Constitutional:  No fever, no fatigue, no chills, no headaches, no weight change  Dermatology:  No rash, no mole, no dry or sensitive skin  ENT:  No cough, no sore throat, no sinus pain, no runny nose, no ear pain  Cardiology:  No chest pain, no palpitations, no leg edema, no shortness of breath, no PND  Gastroenterology:  No dysphagia, no abdominal pain, no nausea, no vomiting, no constipation, no diarrhea, no heartburn  Musculoskeletal:  No joint pain, no leg cramps, no back pain, no muscle aches  Respiratory:  No shortness of breath, no orthopnea, no wheezing, no ASENCIO, no hemoptysis  Urology:  No blood in the urine, no urinary frequency, no urinary incontinence, no urinary urgency, no nocturia, no dysuria    Vitals:    06/15/21 1314   BP: 136/70   Site: Left Upper Arm   Position: Sitting   Cuff Size: Large Adult   Pulse: 69   Temp: 97.5 °F (36.4 °C)   SpO2: 97%   Weight: 188 lb 11.2 oz (85.6 kg)   Height: 5' 2\" (1.575 m)       General:  Patient alert and oriented x 3, NAD, pleasant  HEENT:  Atraumatic, normocephalic, PERRLA, EOMI, clear conjunctiva, TMs clear, nose-clear, throat - no erythema  Neck:  Supple, no goiter, no carotid bruits, no LAD  Lungs:  CTA   Heart:  RRR, no murmurs, gallops or rubs  Abdomen:  Soft/nt/nd, + bowel sounds  Extremities:  No clubbing, cyanosis or edema  Skin: unremarkable    Hemoglobin A1C   Date Value Ref Range Status   11/05/2020 6.3 (H) 4.0 - 5.6 % Final     Cholesterol, Total   Date Value Ref Range Status   12/07/2019 167 0 - 199 mg/dL Final     Triglycerides   Date Value Ref Range Status   12/07/2019 65 0 - 149 mg/dL Final     HDL   Date Value Ref Range Status   12/07/2019 82 >40 mg/dL Final     LDL Calculated   Date Value Ref Range Status   12/07/2019 72 0 - 99 mg/dL Final     VLDL Cholesterol Calculated   Date Value Ref Range Status   12/07/2019 13 mg/dL Final     Sodium   Date Value Ref Range Status   01/23/2021 139 132 - 146 mmol/L Final     Potassium   Date Value Ref Range Status   01/23/2021 3.7 3.5 - 5.0 mmol/L Final     Chloride   Date Value Ref Range Status   01/23/2021 108 (H) 98 - 107 mmol/L Final     CO2   Date Value Ref Range Status   01/23/2021 25 22 - 29 mmol/L Final     BUN   Date Value Ref Range Status   01/23/2021 11 8 - 23 mg/dL Final     CREATININE   Date Value Ref Range Status   01/23/2021 0.6 0.5 - 1.0 mg/dL Final     Glucose   Date Value Ref Range Status   01/23/2021 97 74 - 99 mg/dL Final     Calcium   Date Value Ref Range Status   01/23/2021 8.4 (L) 8.6 - 10.2 mg/dL Final     Total Protein   Date Value Ref Range Status   01/18/2021 7.3 6.4 - 8.3 g/dL Final     Albumin   Date Value Ref Range Status   01/18/2021 3.8 3.5 - 5.2 g/dL Final     Total Bilirubin   Date Value Ref Range Status   01/18/2021 0.3 0.0 - 1.2 mg/dL Final     Alkaline Phosphatase   Date Value Ref Range Status   01/18/2021 91 35 - 104 U/L Final     AST   Date Value Ref Range Status   01/18/2021 20 0 - 31 U/L Final     Comment:     Specimen is slightly Hemolyzed. Result may be artificially increased.      ALT   Date Value Ref Range Status   01/18/2021 12 0 - 32 U/L Final     GFR Non-   Date Value Ref Range Status   01/23/2021 >60 >=60 mL/min/1.73 Final     Comment:     Chronic Kidney Disease: less ([facility]  -     POCT glycosylated hemoglobin (Hb A1C); Standing    Hyperlipidemia, mixed  -     simvastatin (ZOCOR) 20 MG tablet; Take 1 tablet by mouth nightly  -     LIPID PANEL;  Future    Benign paroxysmal positional vertigo, unspecified laterality    I taught patient how to do Epley maneuver in the office today so she can do it at home twice a day and as needed    Patient Instructions   Patient was advised to do Epley maneuver in the morning and bedtime and as needed during the day  Continue prescribed medications  Do lab work before next visit in 3 months             Deyanira Freed MD   6/15/21

## 2021-07-19 ENCOUNTER — TELEPHONE (OUTPATIENT)
Dept: PRIMARY CARE CLINIC | Age: 81
End: 2021-07-19

## 2021-07-19 DIAGNOSIS — R42 VERTIGO: Primary | ICD-10-CM

## 2021-07-19 NOTE — TELEPHONE ENCOUNTER
[facility] [facility]  Clinical Staff  Subject: Message to Provider     QUESTIONS   Information for Provider? pt was told she has positional vertigo and was   given some exercises to help with it/ she is saying it seems to be geting   worse and is wondering if there is an medication that could help.   ---------------------------------------------------------------------------   --------------   CALL BACK INFO   What is the best way for the office to contact you? OK to respond with   electronic message via [employer] portal (only for patients who have   registered [employer] account)   Preferred Call Back Phone Number? [phone]   ---------------------------------------------------------------------------   --------------   SCRIPT ANSWERS   Relationship to Patient?  Self

## 2021-07-20 RX ORDER — MECLIZINE HCL 12.5 MG/1
12.5 TABLET ORAL 3 TIMES DAILY PRN
Qty: 30 TABLET | Refills: 0 | Status: SHIPPED | OUTPATIENT
Start: 2021-07-20 | End: 2021-07-30

## 2021-07-20 NOTE — TELEPHONE ENCOUNTER
Advised patient that I will send a prescription  Also she needs to make an appointment in the next few days

## 2021-07-26 ENCOUNTER — OFFICE VISIT (OUTPATIENT)
Dept: PRIMARY CARE CLINIC | Age: 81
End: 2021-07-26
Payer: MEDICARE

## 2021-07-26 VITALS
SYSTOLIC BLOOD PRESSURE: 136 MMHG | HEART RATE: 72 BPM | HEIGHT: 62 IN | TEMPERATURE: 96.7 F | DIASTOLIC BLOOD PRESSURE: 62 MMHG | BODY MASS INDEX: 35.24 KG/M2 | OXYGEN SATURATION: 97 % | WEIGHT: 191.5 LBS

## 2021-07-26 DIAGNOSIS — E78.2 MIXED HYPERLIPIDEMIA: ICD-10-CM

## 2021-07-26 DIAGNOSIS — H81.13 BENIGN POSITIONAL VERTIGO, BILATERAL: Primary | ICD-10-CM

## 2021-07-26 DIAGNOSIS — I10 HYPERTENSION, ESSENTIAL: ICD-10-CM

## 2021-07-26 DIAGNOSIS — E11.9 TYPE 2 DIABETES MELLITUS WITHOUT COMPLICATION, WITHOUT LONG-TERM CURRENT USE OF INSULIN (HCC): ICD-10-CM

## 2021-07-26 DIAGNOSIS — I48.0 PAROXYSMAL ATRIAL FIBRILLATION (HCC): ICD-10-CM

## 2021-07-26 PROCEDURE — 99214 OFFICE O/P EST MOD 30 MIN: CPT | Performed by: INTERNAL MEDICINE

## 2021-07-26 SDOH — ECONOMIC STABILITY: FOOD INSECURITY: WITHIN THE PAST 12 MONTHS, THE FOOD YOU BOUGHT JUST DIDN'T LAST AND YOU DIDN'T HAVE MONEY TO GET MORE.: NEVER TRUE

## 2021-07-26 SDOH — ECONOMIC STABILITY: FOOD INSECURITY: WITHIN THE PAST 12 MONTHS, YOU WORRIED THAT YOUR FOOD WOULD RUN OUT BEFORE YOU GOT MONEY TO BUY MORE.: NEVER TRUE

## 2021-07-26 ASSESSMENT — SOCIAL DETERMINANTS OF HEALTH (SDOH): HOW HARD IS IT FOR YOU TO PAY FOR THE VERY BASICS LIKE FOOD, HOUSING, MEDICAL CARE, AND HEATING?: SOMEWHAT HARD

## 2021-07-26 NOTE — PROGRESS NOTES
Chief Complaint   Patient presents with    Other     acute visit . c/o dizziness  starting after last visit. called last week for a medication for dizziness. she received call to make appt to be seen. Friday she was called from pharm with script for meclizine ordered 7/20.  Hypertension     brought blood pressure readings from home with variable ranges. HPI:  Patient is here for an acute visit. Complains of dizziness when she gets up out of bed or change posture or turn her head backward to put eyedrops she feels as room spinning around measures her blood pressure find that high. This happens on and off. Had similar symptoms 7 years ago  Tried Epley maneuvers without provement  Patient was prescribed meclizine 12.5 mg tablet 3 times a day did not start it yet. Past Medical History, Surgical History, and Family History has been reviewed and updated. Review of Systems:  Constitutional:  No fever, no fatigue, no chills, no headaches, no weight change  Dermatology:  No rash, no mole, no dry or sensitive skin  ENT:  No cough, no sore throat, no sinus pain, no runny nose, no ear pain  Cardiology:  No chest pain, no palpitations, no leg edema, no shortness of breath, no PND  Gastroenterology:  No dysphagia, no abdominal pain, no nausea, no vomiting, no constipation, no diarrhea, no heartburn.   Musculoskeletal:  No joint pain, no leg cramps, no back pain, no muscle aches  Respiratory:  No shortness of breath, no orthopnea, no wheezing, no ASENCIO, no hemoptysis  Urology:  No blood in the urine, no urinary frequency, no urinary incontinence, no urinary urgency, no nocturia, no dysuria    Vitals:    07/26/21 1508   BP: 136/62   Site: Left Upper Arm   Position: Sitting   Cuff Size: Large Adult   Pulse: 72   Temp: 96.7 °F (35.9 °C)   SpO2: 97%   Weight: 191 lb 8 oz (86.9 kg)   Height: 5' 2\" (1.575 m)       General:  Patient alert and oriented x 3, NAD, pleasant  HEENT:  Atraumatic, normocephalic, PERRLA, EOMI, clear conjunctiva, TMs clear, nose-clear, throat - no erythema  Neck:  Supple, no goiter, no carotid bruits, no LAD  Lungs:  CTA   Heart:  RRR, no murmurs, gallops or rubs  Abdomen:  Soft/nt/nd, + bowel sounds  Extremities:  No clubbing, cyanosis or edema  Skin: unremarkable  Neuro exam: Reproducible dizziness with head movements no focal motor or sensory deficits cranial nerves intact.   Gait within normal and she has  Amputated leg was a prosthesis    Hemoglobin A1C   Date Value Ref Range Status   11/05/2020 6.3 (H) 4.0 - 5.6 % Final     Cholesterol, Total   Date Value Ref Range Status   12/07/2019 167 0 - 199 mg/dL Final     Triglycerides   Date Value Ref Range Status   12/07/2019 65 0 - 149 mg/dL Final     HDL   Date Value Ref Range Status   12/07/2019 82 >40 mg/dL Final     LDL Calculated   Date Value Ref Range Status   12/07/2019 72 0 - 99 mg/dL Final     VLDL Cholesterol Calculated   Date Value Ref Range Status   12/07/2019 13 mg/dL Final     Sodium   Date Value Ref Range Status   01/23/2021 139 132 - 146 mmol/L Final     Potassium   Date Value Ref Range Status   01/23/2021 3.7 3.5 - 5.0 mmol/L Final     Chloride   Date Value Ref Range Status   01/23/2021 108 (H) 98 - 107 mmol/L Final     CO2   Date Value Ref Range Status   01/23/2021 25 22 - 29 mmol/L Final     BUN   Date Value Ref Range Status   01/23/2021 11 8 - 23 mg/dL Final     CREATININE   Date Value Ref Range Status   01/23/2021 0.6 0.5 - 1.0 mg/dL Final     Glucose   Date Value Ref Range Status   01/23/2021 97 74 - 99 mg/dL Final     Calcium   Date Value Ref Range Status   01/23/2021 8.4 (L) 8.6 - 10.2 mg/dL Final     Total Protein   Date Value Ref Range Status   01/18/2021 7.3 6.4 - 8.3 g/dL Final     Albumin   Date Value Ref Range Status   01/18/2021 3.8 3.5 - 5.2 g/dL Final     Total Bilirubin   Date Value Ref Range Status   01/18/2021 0.3 0.0 - 1.2 mg/dL Final     Alkaline Phosphatase   Date Value Ref Range Status   01/18/2021 91 35 - 104 U/L Final     AST   Date Value Ref Range Status   01/18/2021 20 0 - 31 U/L Final     Comment:     Specimen is slightly Hemolyzed. Result may be artificially increased. ALT   Date Value Ref Range Status   01/18/2021 12 0 - 32 U/L Final     GFR Non-   Date Value Ref Range Status   01/23/2021 >60 >=60 mL/min/1.73 Final     Comment:     Chronic Kidney Disease: less than 60 ml/min/1.73 sq.m. Kidney Failure: less than 15 ml/min/1.73 sq.m. Results valid for patients 18 years and older. GFR    Date Value Ref Range Status   01/23/2021 >60  Final        No results found. Assessment/Plan:      Outpatient Encounter Medications as of 7/26/2021   Medication Sig Dispense Refill    amLODIPine (NORVASC) 5 MG tablet Take 1 tablet by mouth daily 90 tablet 0    lisinopril (PRINIVIL;ZESTRIL) 20 MG tablet Take 1 tablet by mouth daily 90 tablet 0    simvastatin (ZOCOR) 20 MG tablet Take 1 tablet by mouth nightly 90 tablet 0    Calcium Carbonate (CALCIUM 600 PO) Take 1 capsule by mouth daily       apixaban (ELIQUIS) 5 MG TABS tablet Take 5 mg by mouth 2 times daily      vitamin D 25 MCG (1000 UT) CAPS Take 1 capsule by mouth daily      aspirin 81 MG EC tablet Take 81 mg by mouth daily      BIOTIN PO Take 1,000 mcg by mouth daily       LUTEIN PO Take by mouth daily      MULTIPLE VITAMINS PO Take by mouth daily      meclizine (ANTIVERT) 12.5 MG tablet Take 1 tablet by mouth 3 times daily as needed for Dizziness 30 tablet 0    VITAMIN D PO Take by mouth daily (Patient not taking: Reported on 6/15/2021)       No facility-administered encounter medications on file as of 7/26/2021. Marbella Durand was seen today for other and hypertension. Diagnoses and all orders for this visit:    Benign positional vertigo, bilateral  -     [facility] Ear, Nose, Throat    Hypertension, essential  -     CBC WITH AUTO DIFFERENTIAL;  Future  -     COMPREHENSIVE METABOLIC PANEL;

## 2021-07-26 NOTE — PATIENT INSTRUCTIONS
Patient was advised to continue to do the Epley maneuvers twice a day and as needed  Take meclizine 12.5 mg tablet at bedtime DAILY and as needed morning and afternoon  Consult ENT  Lab work prior to next visit

## 2021-08-10 LAB
ALBUMIN SERPL-MCNC: NORMAL G/DL
ALP BLD-CCNC: NORMAL U/L
ALT SERPL-CCNC: NORMAL U/L
ANION GAP SERPL CALCULATED.3IONS-SCNC: NORMAL MMOL/L
AST SERPL-CCNC: NORMAL U/L
BILIRUB SERPL-MCNC: NORMAL MG/DL
BUN BLDV-MCNC: NORMAL MG/DL
CALCIUM SERPL-MCNC: NORMAL MG/DL
CHLORIDE BLD-SCNC: NORMAL MMOL/L
CHOLESTEROL, TOTAL: NORMAL
CHOLESTEROL/HDL RATIO: NORMAL
CO2: NORMAL
CREAT SERPL-MCNC: NORMAL MG/DL
GFR CALCULATED: NORMAL
GLUCOSE BLD-MCNC: NORMAL MG/DL
HDLC SERPL-MCNC: NORMAL MG/DL
LDL CHOLESTEROL CALCULATED: NORMAL
NONHDLC SERPL-MCNC: NORMAL MG/DL
POTASSIUM SERPL-SCNC: NORMAL MMOL/L
SODIUM BLD-SCNC: NORMAL MMOL/L
TOTAL PROTEIN: NORMAL
TRIGL SERPL-MCNC: NORMAL MG/DL
VLDLC SERPL CALC-MCNC: NORMAL MG/DL

## 2021-08-17 ENCOUNTER — OFFICE VISIT (OUTPATIENT)
Dept: PRIMARY CARE CLINIC | Age: 81
End: 2021-08-17
Payer: MEDICARE

## 2021-08-17 VITALS
WEIGHT: 191.7 LBS | DIASTOLIC BLOOD PRESSURE: 86 MMHG | BODY MASS INDEX: 35.28 KG/M2 | HEIGHT: 62 IN | SYSTOLIC BLOOD PRESSURE: 130 MMHG | TEMPERATURE: 96.8 F | HEART RATE: 64 BPM | OXYGEN SATURATION: 93 %

## 2021-08-17 DIAGNOSIS — H81.13 BENIGN PAROXYSMAL POSITIONAL VERTIGO DUE TO BILATERAL VESTIBULAR DISORDER: Primary | ICD-10-CM

## 2021-08-17 DIAGNOSIS — E78.2 HYPERLIPIDEMIA, MIXED: ICD-10-CM

## 2021-08-17 DIAGNOSIS — I48.0 PAROXYSMAL ATRIAL FIBRILLATION (HCC): ICD-10-CM

## 2021-08-17 DIAGNOSIS — I10 HYPERTENSION, ESSENTIAL: ICD-10-CM

## 2021-08-17 PROCEDURE — 99213 OFFICE O/P EST LOW 20 MIN: CPT | Performed by: INTERNAL MEDICINE

## 2021-08-17 RX ORDER — SIMVASTATIN 20 MG
20 TABLET ORAL NIGHTLY
Qty: 90 TABLET | Refills: 0 | Status: SHIPPED
Start: 2021-08-17 | End: 2021-12-29 | Stop reason: SDUPTHER

## 2021-08-17 RX ORDER — LISINOPRIL 20 MG/1
20 TABLET ORAL DAILY
Qty: 90 TABLET | Refills: 0 | Status: SHIPPED
Start: 2021-08-17 | End: 2022-01-26 | Stop reason: SDUPTHER

## 2021-08-17 RX ORDER — AMLODIPINE BESYLATE 5 MG/1
5 TABLET ORAL DAILY
Qty: 90 TABLET | Refills: 0 | Status: SHIPPED
Start: 2021-08-17 | End: 2022-01-26 | Stop reason: SDUPTHER

## 2021-08-17 NOTE — PROGRESS NOTES
Chief Complaint   Patient presents with    Dizziness     Pt is here as a 3 week F/U, with no recent exposure to Covid. Pt is still c/o dizziness and is taking her Antivert at HS.        HPI:  Patient is here for follow-up . Dizziness feels some better. Feels dizzy sometimes when she looks up last for a few seconds. she takes meclizine at night, does not do Epley maneuvers as often as advised. No cardiopulmonary symptoms. Compliant on medications  Had an ultrasound of the carotids last month by vascular surgery. Patent  ENT appointment scheduled end of September    Past Medical History, Surgical History, and Family History has been reviewed and updated.     Review of Systems:  Constitutional:  No fever, no fatigue, no chills, no headaches, no weight change  Dermatology:  No rash, no mole, no dry or sensitive skin  ENT:  No cough, no sore throat, no sinus pain, no runny nose, no ear pain  Cardiology:  No chest pain, no palpitations, no leg edema, no shortness of breath, no PND  Gastroenterology:  No dysphagia, no abdominal pain, no nausea, no vomiting, no constipation, no diarrhea, no heartburn  Musculoskeletal:  No joint pain, no leg cramps, no back pain, no muscle aches  Respiratory:  No shortness of breath, no orthopnea, no wheezing, no ASENCIO, no hemoptysis  Urology:  No blood in the urine, no urinary frequency, no urinary incontinence, no urinary urgency, no nocturia, no dysuria    Vitals:    08/17/21 1514   BP: 130/86   Pulse: 64   Temp: 96.8 °F (36 °C)   TempSrc: Temporal   SpO2: 93%   Weight: 191 lb 11.2 oz (87 kg)   Height: 5' 2\" (1.575 m)       General:  Patient alert and oriented x 3, NAD, pleasant  HEENT:  Atraumatic, normocephalic, PERRLA, EOMI, clear conjunctiva, TMs clear, nose-clear, throat - no erythema  Neck:  Supple, no goiter, no carotid bruits, no LAD  Lungs:  CTA   Heart:  RRR, no murmurs, gallops or rubs  Abdomen:  Soft/nt/nd, + bowel sounds  Extremities:  No clubbing, cyanosis or edema  Skin: unremarkable    Hemoglobin A1C   Date Value Ref Range Status   11/05/2020 6.3 (H) 4.0 - 5.6 % Final     Cholesterol, Total   Date Value Ref Range Status   12/07/2019 167 0 - 199 mg/dL Final     Triglycerides   Date Value Ref Range Status   12/07/2019 65 0 - 149 mg/dL Final     HDL   Date Value Ref Range Status   12/07/2019 82 >40 mg/dL Final     LDL Calculated   Date Value Ref Range Status   12/07/2019 72 0 - 99 mg/dL Final     VLDL Cholesterol Calculated   Date Value Ref Range Status   12/07/2019 13 mg/dL Final     Sodium   Date Value Ref Range Status   01/23/2021 139 132 - 146 mmol/L Final     Potassium   Date Value Ref Range Status   01/23/2021 3.7 3.5 - 5.0 mmol/L Final     Chloride   Date Value Ref Range Status   01/23/2021 108 (H) 98 - 107 mmol/L Final     CO2   Date Value Ref Range Status   01/23/2021 25 22 - 29 mmol/L Final     BUN   Date Value Ref Range Status   01/23/2021 11 8 - 23 mg/dL Final     CREATININE   Date Value Ref Range Status   01/23/2021 0.6 0.5 - 1.0 mg/dL Final     Glucose   Date Value Ref Range Status   01/23/2021 97 74 - 99 mg/dL Final     Calcium   Date Value Ref Range Status   01/23/2021 8.4 (L) 8.6 - 10.2 mg/dL Final     Total Protein   Date Value Ref Range Status   01/18/2021 7.3 6.4 - 8.3 g/dL Final     Albumin   Date Value Ref Range Status   01/18/2021 3.8 3.5 - 5.2 g/dL Final     Total Bilirubin   Date Value Ref Range Status   01/18/2021 0.3 0.0 - 1.2 mg/dL Final     Alkaline Phosphatase   Date Value Ref Range Status   01/18/2021 91 35 - 104 U/L Final     AST   Date Value Ref Range Status   01/18/2021 20 0 - 31 U/L Final     Comment:     Specimen is slightly Hemolyzed. Result may be artificially increased. ALT   Date Value Ref Range Status   01/18/2021 12 0 - 32 U/L Final     GFR Non-   Date Value Ref Range Status   01/23/2021 >60 >=60 mL/min/1.73 Final     Comment:     Chronic Kidney Disease: less than 60 ml/min/1.73 sq.m.           Kidney Failure: less than 15 ml/min/1.73 sq.m. Results valid for patients 18 years and older. GFR    Date Value Ref Range Status   01/23/2021 >60  Final        No results found. Assessment/Plan:      Outpatient Encounter Medications as of 8/17/2021   Medication Sig Dispense Refill    amLODIPine (NORVASC) 5 MG tablet Take 1 tablet by mouth daily 90 tablet 0    lisinopril (PRINIVIL;ZESTRIL) 20 MG tablet Take 1 tablet by mouth daily 90 tablet 0    simvastatin (ZOCOR) 20 MG tablet Take 1 tablet by mouth nightly 90 tablet 0    apixaban (ELIQUIS) 5 MG TABS tablet Take 1 tablet by mouth 2 times daily 180 tablet 0    Calcium Carbonate (CALCIUM 600 PO) Take 1 capsule by mouth daily       vitamin D 25 MCG (1000 UT) CAPS Take 1 capsule by mouth daily      aspirin 81 MG EC tablet Take 81 mg by mouth daily      BIOTIN PO Take 1,000 mcg by mouth daily       LUTEIN PO Take by mouth daily      MULTIPLE VITAMINS PO Take by mouth daily      [DISCONTINUED] amLODIPine (NORVASC) 5 MG tablet Take 1 tablet by mouth daily 90 tablet 0    [DISCONTINUED] lisinopril (PRINIVIL;ZESTRIL) 20 MG tablet Take 1 tablet by mouth daily 90 tablet 0    [DISCONTINUED] simvastatin (ZOCOR) 20 MG tablet Take 1 tablet by mouth nightly 90 tablet 0    [DISCONTINUED] apixaban (ELIQUIS) 5 MG TABS tablet Take 5 mg by mouth 2 times daily      [DISCONTINUED] VITAMIN D PO Take by mouth daily       No facility-administered encounter medications on file as of 8/17/2021. Jerry Hardwick was seen today for dizziness. Diagnoses and all orders for this visit:    Paroxysmal atrial fibrillation (HCC)  -     apixaban (ELIQUIS) 5 MG TABS tablet; Take 1 tablet by mouth 2 times daily    Hypertension, essential  -     amLODIPine (NORVASC) 5 MG tablet; Take 1 tablet by mouth daily  -     lisinopril (PRINIVIL;ZESTRIL) 20 MG tablet; Take 1 tablet by mouth daily    Hyperlipidemia, mixed  -     simvastatin (ZOCOR) 20 MG tablet;  Take 1 tablet by mouth nightly    Benign paroxysmal positional vertigo due to bilateral vestibular disorder         There are no Patient Instructions on file for this visit.            Bushra Vera MD   8/17/21

## 2021-08-23 RX ORDER — MECLIZINE HCL 12.5 MG/1
12.5 TABLET ORAL 3 TIMES DAILY PRN
COMMUNITY
End: 2021-08-23 | Stop reason: SDUPTHER

## 2021-08-23 RX ORDER — MECLIZINE HCL 12.5 MG/1
12.5 TABLET ORAL 3 TIMES DAILY PRN
Qty: 45 TABLET | Refills: 0 | Status: SHIPPED
Start: 2021-08-23 | End: 2021-10-08 | Stop reason: SDUPTHER

## 2021-09-29 ENCOUNTER — PROCEDURE VISIT (OUTPATIENT)
Dept: AUDIOLOGY | Age: 81
End: 2021-09-29
Payer: MEDICARE

## 2021-09-29 ENCOUNTER — OFFICE VISIT (OUTPATIENT)
Dept: ENT CLINIC | Age: 81
End: 2021-09-29
Payer: MEDICARE

## 2021-09-29 VITALS
SYSTOLIC BLOOD PRESSURE: 138 MMHG | WEIGHT: 192 LBS | BODY MASS INDEX: 35.33 KG/M2 | HEART RATE: 86 BPM | DIASTOLIC BLOOD PRESSURE: 78 MMHG | HEIGHT: 62 IN

## 2021-09-29 DIAGNOSIS — H81.13 BENIGN PAROXYSMAL POSITIONAL VERTIGO DUE TO BILATERAL VESTIBULAR DISORDER: ICD-10-CM

## 2021-09-29 DIAGNOSIS — H81.11 BPPV (BENIGN PAROXYSMAL POSITIONAL VERTIGO), RIGHT: ICD-10-CM

## 2021-09-29 DIAGNOSIS — H93.8X2 EAR FULLNESS, LEFT: Primary | ICD-10-CM

## 2021-09-29 PROCEDURE — 99204 OFFICE O/P NEW MOD 45 MIN: CPT | Performed by: OTOLARYNGOLOGY

## 2021-09-29 PROCEDURE — 92567 TYMPANOMETRY: CPT | Performed by: AUDIOLOGIST

## 2021-09-29 PROCEDURE — 97140 MANUAL THERAPY 1/> REGIONS: CPT | Performed by: OTOLARYNGOLOGY

## 2021-09-29 ASSESSMENT — ENCOUNTER SYMPTOMS
COUGH: 0
VOMITING: 0
SHORTNESS OF BREATH: 0

## 2021-09-29 NOTE — PROGRESS NOTES
[facility]ol[facility]  Dr. Nathan Bellamy. ELHAM Jones Ms.Ed. New Consult       Patient Name:  Chan Coley  :  1940     CHIEF C/O:    Chief Complaint   Patient presents with   Scott Other     NP benign vertigo       HISTORY OBTAINED FROM:  patient    HISTORY OF PRESENT ILLNESS:       Haris Santamaria is a 78y.o. year old female, here today for room spinning vertigo going on for a couple of weeks particularly with position changes; no new hearing loss, wears hearing aids. Full audiogram conducted today and reviewed with patient. No no new hearing loss complaints, no previous history of ear surgeries, no previous history of ear trauma. No other complaints today epistaxis congestion fever chills sore throat hoarseness neck mass tumor lesions.       Past Medical History:   Diagnosis Date    Atrial fibrillation ([address]     Cancer (HCC)     breast, right    Carotid stenosis, right     Diabetes mellitus ([address]     Heart murmur     Hx of radiation therapy     Hyperlipidemia     Hypertension     Major depressive disorder     Osteopenia     Osteoporosis     PONV (postoperative nausea and vomiting)     Syncope     TIA (transient ischemic attack)      Past Surgical History:   Procedure Laterality Date    AMPUTATION Left     BKA- wears prosthesis    BREAST SURGERY Right     lumpectomy    COLONOSCOPY      HYSTERECTOMY      IR CAROTID STENT UNI W PROTECTION  2021    IR CAROTID STENT UNI W PROTECTION 2021 [id number]       Current Outpatient Medications:     meclizine (ANTIVERT) 12.5 MG tablet, Take 1 tablet by mouth 3 times daily as needed for Dizziness, Disp: 45 tablet, Rfl: 0    amLODIPine (NORVASC) 5 MG tablet, Take 1 tablet by mouth daily, Disp: 90 tablet, Rfl: 0    lisinopril (PRINIVIL;ZESTRIL) 20 MG tablet, Take 1 tablet by mouth daily, Disp: 90 tablet, Rfl: 0    simvastatin (ZOCOR) 20 MG tablet, Take 1 tablet by mouth nightly, Disp: 90 tablet, Rfl: 0    apixaban (ELIQUIS) 5 MG TABS tablet, Take 1 tablet by mouth 2 times daily, Disp: 180 tablet, Rfl: 0    Calcium Carbonate (CALCIUM 600 PO), Take 1 capsule by mouth daily , Disp: , Rfl:     vitamin D 25 MCG (1000 UT) CAPS, Take 1 capsule by mouth daily, Disp: , Rfl:     aspirin 81 MG EC tablet, Take 81 mg by mouth daily, Disp: , Rfl:     BIOTIN PO, Take 1,000 mcg by mouth daily , Disp: , Rfl:     LUTEIN PO, Take by mouth daily, Disp: , Rfl:     MULTIPLE VITAMINS PO, Take by mouth daily, Disp: , Rfl:   Neosporin [neomycin-polymyxin-gramicidin]  Social History     Tobacco Use    Smoking status: Former Smoker     Quit date: 1982     Years since quittin.7    Smokeless tobacco: Never Used   Substance Use Topics    Alcohol use: Yes     Comment: occ- twice a month    Drug use: Not on file     No family history on file. Review of Systems   Constitutional: Negative for chills and fever. HENT: Positive for hearing loss. Negative for ear discharge and tinnitus. Respiratory: Negative for cough and shortness of breath. Cardiovascular: Negative for chest pain and palpitations. Gastrointestinal: Negative for vomiting. Skin: Negative for rash. Allergic/Immunologic: Negative for environmental allergies. Neurological: Positive for dizziness. Negative for syncope and headaches. Hematological: Does not bruise/bleed easily. All other systems reviewed and are negative. /78 (Site: Left Lower Arm, Position: Sitting, Cuff Size: Medium Adult)   Pulse 86   Ht 5' 2\" (1.575 m)   Wt 192 lb (87.1 kg)   BMI 35.12 kg/m²   Physical Exam  Vitals and nursing note reviewed. Constitutional:       Appearance: She is well-developed. HENT:      Head: Normocephalic and atraumatic. No contusion, masses or laceration. Jaw: No tenderness or swelling. Salivary Glands: Right salivary gland is not diffusely enlarged. Left salivary gland is not diffusely enlarged.       Right Ear: Tympanic membrane, ear canal and external ear normal. Decreased hearing noted. No drainage. There is no impacted cerumen. Left Ear: Ear canal and external ear normal. Decreased hearing noted. No drainage. There is no impacted cerumen. Nose: No septal deviation, laceration, congestion or rhinorrhea. Right Nostril: No epistaxis. Left Nostril: No epistaxis. Right Turbinates: Not enlarged. Left Turbinates: Not enlarged. Mouth/Throat:      Mouth: Mucous membranes are moist. No oral lesions. Dentition: No gum lesions. Tongue: No lesions. Pharynx: No oropharyngeal exudate or posterior oropharyngeal erythema. Eyes:      Pupils: Pupils are equal, round, and reactive to light. Neck:      Thyroid: No thyromegaly. Trachea: No tracheal deviation. Cardiovascular:      Rate and Rhythm: Normal rate. Pulmonary:      Effort: Pulmonary effort is normal. No respiratory distress. Musculoskeletal:         General: Normal range of motion. Cervical back: Normal range of motion. Lymphadenopathy:      Cervical: No cervical adenopathy. Skin:     General: Skin is warm. Findings: No erythema. Neurological:      Mental Status: She is alert. Cranial Nerves: No cranial nerve deficit. Arsalan-Hallpike positive on the right, full Epley maneuver was performed without complication patient tolerated well with short episode of visible nystagmus. IMPRESSION/PLAN:  Patient seen and examined for history of room spinning vertigo, found to have inducible responding vertigo with right Arsalan-Hallpike, placed on at home [place] exercises after a successful Epley maneuver in the office today. Continue Flonase for eustachian tube dysfunction follow-up in 2 weeks  Dr. Prudence Brunner [facility] Otolaryngology/Facial Plastic Surgery Residency  Associate Clinical Professor:  Elena Mohan, [facility]

## 2021-09-29 NOTE — PROGRESS NOTES
This patient was referred for tympanometric testing by Dr. Abbe Briceno due to bilateral vertigo, ear fullness, left ear and a bilateral hearing loss. Patient is scheduled for a hearing test, at her other audiologist office. Tympanometry revealed normal middle ear peak pressure and compliance, in the right ear. A flat tympanogram was obtained, left ear. Ipsilateral acoustic reflexes were absent, bilaterally at 1000Hz. The results were reviewed with the patient. Recommendations for follow up will be made pending physician consult.     Merissa Marquez CCC/FARRAH  Audiologist  [id number]  NPI#:  [id number]

## 2021-10-08 RX ORDER — MECLIZINE HCL 12.5 MG/1
12.5 TABLET ORAL 3 TIMES DAILY PRN
Qty: 45 TABLET | Refills: 0 | Status: SHIPPED
Start: 2021-10-08 | End: 2022-07-25 | Stop reason: ALTCHOICE

## 2021-10-13 ENCOUNTER — OFFICE VISIT (OUTPATIENT)
Dept: ENT CLINIC | Age: 81
End: 2021-10-13
Payer: MEDICARE

## 2021-10-13 VITALS
SYSTOLIC BLOOD PRESSURE: 156 MMHG | DIASTOLIC BLOOD PRESSURE: 58 MMHG | BODY MASS INDEX: 36.25 KG/M2 | WEIGHT: 197 LBS | HEART RATE: 80 BPM | HEIGHT: 62 IN

## 2021-10-13 DIAGNOSIS — H81.11 BPPV (BENIGN PAROXYSMAL POSITIONAL VERTIGO), RIGHT: ICD-10-CM

## 2021-10-13 DIAGNOSIS — H93.8X2 EAR FULLNESS, LEFT: Primary | ICD-10-CM

## 2021-10-13 PROCEDURE — 99213 OFFICE O/P EST LOW 20 MIN: CPT | Performed by: OTOLARYNGOLOGY

## 2021-10-13 NOTE — PROGRESS NOTES
[facility]  Dr. Roxann Sever. Humza Gardner. Ms.Ed        Patient Name:  Adelina Coley  :  1940     CHIEF C/O:    Chief Complaint   Patient presents with    Follow-up     2wk bppv       HISTORY OBTAINED FROM:  patient    HISTORY OF PRESENT ILLNESS:       Sae Mcgowan is a 78y.o. year old female, here today for follow up of here for follow-up on benign positional vertigo, was given at home exercises a while as I will be over here in the office. Patient is significantly improved, with no significant new concerns of vertigo. No other complaints today of new hearing loss ear pain or drainage, no complaints of fever chills shortness of breath stridor nausea vomiting chest pain.       Past Medical History:   Diagnosis Date    Atrial fibrillation ([address]     Cancer (HCC)     breast, right    Carotid stenosis, right     Diabetes mellitus ([address]     Heart murmur     Hx of radiation therapy     Hyperlipidemia     Hypertension     Major depressive disorder     Osteopenia     Osteoporosis     PONV (postoperative nausea and vomiting)     Syncope     TIA (transient ischemic attack)      Past Surgical History:   Procedure Laterality Date    AMPUTATION Left     BKA- wears prosthesis    BREAST SURGERY Right     lumpectomy    COLONOSCOPY      HYSTERECTOMY      IR CAROTID STENT UNI W PROTECTION  2021    IR CAROTID STENT UNI W PROTECTION 2021 [facility] PROCED[facility]       Current Outpatient Medications:     meclizine (ANTIVERT) 12.5 MG tablet, Take 1 tablet by mouth 3 times daily as needed for Dizziness, Disp: 45 tablet, Rfl: 0    amLODIPine (NORVASC) 5 MG tablet, Take 1 tablet by mouth daily, Disp: 90 tablet, Rfl: 0    lisinopril (PRINIVIL;ZESTRIL) 20 MG tablet, Take 1 tablet by mouth daily, Disp: 90 tablet, Rfl: 0    simvastatin (ZOCOR) 20 MG tablet, Take 1 tablet by mouth nightly, Disp: 90 tablet, Rfl: 0    apixaban (ELIQUIS) 5 MG TABS tablet, Take 1 tablet by mouth 2 times daily, Disp: 180 tablet, Rfl: 0    Calcium Carbonate (CALCIUM 600 PO), Take 1 capsule by mouth daily , Disp: , Rfl:     vitamin D 25 MCG (1000 UT) CAPS, Take 1 capsule by mouth daily, Disp: , Rfl:     aspirin 81 MG EC tablet, Take 81 mg by mouth daily, Disp: , Rfl:     BIOTIN PO, Take 1,000 mcg by mouth daily , Disp: , Rfl:     LUTEIN PO, Take by mouth daily, Disp: , Rfl:     MULTIPLE VITAMINS PO, Take by mouth daily, Disp: , Rfl:   Neosporin [neomycin-polymyxin-gramicidin]  Social History     Tobacco Use    Smoking status: Former Smoker     Quit date: 1982     Years since quittin.8    Smokeless tobacco: Never Used   Substance Use Topics    Alcohol use: Yes     Comment: occ- twice a month    Drug use: Not on file     No family history on file. Review of Systems   Constitutional: Negative for chills and fever. HENT: Negative for ear discharge, hearing loss, sneezing, sore throat and trouble swallowing. Respiratory: Negative for cough and shortness of breath. Cardiovascular: Negative for chest pain and palpitations. Gastrointestinal: Negative for vomiting. Skin: Negative for rash. Allergic/Immunologic: Negative for environmental allergies. Neurological: Positive for dizziness. Negative for headaches. Hematological: Does not bruise/bleed easily. All other systems reviewed and are negative. BP (!) 156/58 (Site: Left Upper Arm, Position: Sitting, Cuff Size: Large Adult)   Pulse 80   Ht 5' 2\" (1.575 m)   Wt 197 lb (89.4 kg)   BMI 36.03 kg/m²   Physical Exam  Vitals and nursing note reviewed. Constitutional:       Appearance: She is well-developed. HENT:      Head: Atraumatic. Right Ear: Tympanic membrane and ear canal normal. There is no impacted cerumen. Left Ear: Tympanic membrane and ear canal normal. There is no impacted cerumen. Nose: No congestion or rhinorrhea.       Mouth/Throat:      Mouth: Mucous membranes are moist.      Pharynx: Oropharynx is

## 2021-10-18 ENCOUNTER — OFFICE VISIT (OUTPATIENT)
Dept: PRIMARY CARE CLINIC | Age: 81
End: 2021-10-18
Payer: MEDICARE

## 2021-10-18 VITALS
WEIGHT: 194.2 LBS | HEIGHT: 62 IN | BODY MASS INDEX: 35.74 KG/M2 | TEMPERATURE: 96.7 F | HEART RATE: 84 BPM | OXYGEN SATURATION: 95 % | SYSTOLIC BLOOD PRESSURE: 136 MMHG | DIASTOLIC BLOOD PRESSURE: 50 MMHG

## 2021-10-18 DIAGNOSIS — L25.9 CONTACT DERMATITIS, UNSPECIFIED CONTACT DERMATITIS TYPE, UNSPECIFIED TRIGGER: Primary | ICD-10-CM

## 2021-10-18 DIAGNOSIS — I10 HYPERTENSION, ESSENTIAL: ICD-10-CM

## 2021-10-18 PROCEDURE — 99213 OFFICE O/P EST LOW 20 MIN: CPT | Performed by: INTERNAL MEDICINE

## 2021-10-18 RX ORDER — CETIRIZINE HYDROCHLORIDE 10 MG/1
10 TABLET ORAL DAILY
Qty: 30 TABLET | Refills: 0 | Status: SHIPPED | OUTPATIENT
Start: 2021-10-18 | End: 2021-11-17

## 2021-10-18 RX ORDER — CLOBETASOL PROPIONATE 0.5 MG/G
OINTMENT TOPICAL
Qty: 30 G | Refills: 1 | Status: SHIPPED
Start: 2021-10-18 | End: 2022-07-25 | Stop reason: ALTCHOICE

## 2021-10-18 NOTE — PROGRESS NOTES
Chief Complaint   Patient presents with    Other     acute visit. rash started about 5 days ago on left and right wrist area and has spread to left thigh . states it is very itchy.  Other     denies covid symptoms,  had covid vaccines. HPI:  Patient is here for acute visit because of a rash involving her left wrist and forearm right forearm and right thigh. Started few days ago itching red no tenderness no fever NO pus drainage  Patient is not sure if she got close to poison ivy or plants    Past Medical History, Surgical History, and Family History has been reviewed and updated.     Review of Systems:  Constitutional:  No fever, no fatigue, no chills, no headaches, no weight change  Dermatology:  No rash, no mole, no dry or sensitive skin  ENT:  No cough, no sore throat, no sinus pain, no runny nose, no ear pain  Cardiology:  No chest pain, no palpitations, no leg edema, no shortness of breath, no PND  Gastroenterology:  No dysphagia, no abdominal pain, no nausea, no vomiting, no constipation, no diarrhea, no heartburn  Musculoskeletal:  No joint pain, no leg cramps, no back pain, no muscle aches  Respiratory:  No shortness of breath, no orthopnea, no wheezing, no ASENCIO, no hemoptysis  Urology:  No blood in the urine, no urinary frequency, no urinary incontinence, no urinary urgency, no nocturia, no dysuria    Vitals:    10/18/21 1432 10/18/21 1442   BP: (!) 136/50 (!) 136/50   Site: Left Upper Arm    Position: Sitting    Cuff Size: Large Adult    Pulse: 84    Temp: 96.7 °F (35.9 °C)    SpO2: 95%    Weight: 194 lb 3.2 oz (88.1 kg)    Height: 5' 2\" (1.575 m)        General:  Patient alert and oriented x 3, NAD, pleasant  HEENT:  Atraumatic, normocephalic, PERRLA, ES spread in the left forearm right forearm and inner thigh BISI, clear conjunctiva, TMs clear, nose-clear, throat - no erythema  Neck:  Supple, no goiter, no carotid bruits, no LAD  Lungs:  CTA   Heart:  RRR, no murmurs, gallops or rubs  Abdomen:  Soft/nt/nd, + bowel sounds  Extremities:  No clubbing, cyanosis or edema  Skin: Erythematous 5 mm papules, some has blister, spread over left and right forearms and right inner thigh. No skin lesions in the trunk face neck. Hemoglobin A1C   Date Value Ref Range Status   11/05/2020 6.3 (H) 4.0 - 5.6 % Final     Cholesterol, Total   Date Value Ref Range Status   12/07/2019 167 0 - 199 mg/dL Final     Triglycerides   Date Value Ref Range Status   12/07/2019 65 0 - 149 mg/dL Final     HDL   Date Value Ref Range Status   12/07/2019 82 >40 mg/dL Final     LDL Calculated   Date Value Ref Range Status   12/07/2019 72 0 - 99 mg/dL Final     VLDL Cholesterol Calculated   Date Value Ref Range Status   12/07/2019 13 mg/dL Final     Sodium   Date Value Ref Range Status   01/23/2021 139 132 - 146 mmol/L Final     Potassium   Date Value Ref Range Status   01/23/2021 3.7 3.5 - 5.0 mmol/L Final     Chloride   Date Value Ref Range Status   01/23/2021 108 (H) 98 - 107 mmol/L Final     CO2   Date Value Ref Range Status   01/23/2021 25 22 - 29 mmol/L Final     BUN   Date Value Ref Range Status   01/23/2021 11 8 - 23 mg/dL Final     CREATININE   Date Value Ref Range Status   01/23/2021 0.6 0.5 - 1.0 mg/dL Final     Glucose   Date Value Ref Range Status   01/23/2021 97 74 - 99 mg/dL Final     Calcium   Date Value Ref Range Status   01/23/2021 8.4 (L) 8.6 - 10.2 mg/dL Final     Total Protein   Date Value Ref Range Status   01/18/2021 7.3 6.4 - 8.3 g/dL Final     Albumin   Date Value Ref Range Status   01/18/2021 3.8 3.5 - 5.2 g/dL Final     Total Bilirubin   Date Value Ref Range Status   01/18/2021 0.3 0.0 - 1.2 mg/dL Final     Alkaline Phosphatase   Date Value Ref Range Status   01/18/2021 91 35 - 104 U/L Final     AST   Date Value Ref Range Status   01/18/2021 20 0 - 31 U/L Final     Comment:     Specimen is slightly Hemolyzed. Result may be artificially increased.      ALT   Date Value Ref Range Status   01/18/2021 12 0 - 32 U/L Final     GFR Non-   Date Value Ref Range Status   01/23/2021 >60 >=60 mL/min/1.73 Final     Comment:     Chronic Kidney Disease: less than 60 ml/min/1.73 sq.m. Kidney Failure: less than 15 ml/min/1.73 sq.m. Results valid for patients 18 years and older. GFR    Date Value Ref Range Status   01/23/2021 >60  Final        No results found. Assessment/Plan:   Contact dermatitis, start Temovate 0.05% ointment twice a day Zyrtec 10 mg tablet daily  Continue current prescribed medications  Hypertension controlled    Outpatient Encounter Medications as of 10/18/2021   Medication Sig Dispense Refill    clobetasol (TEMOVATE) 0.05 % ointment Apply topically 2 times daily. 30 g 1    cetirizine (ZYRTEC) 10 MG tablet Take 1 tablet by mouth daily 30 tablet 0    amLODIPine (NORVASC) 5 MG tablet Take 1 tablet by mouth daily 90 tablet 0    lisinopril (PRINIVIL;ZESTRIL) 20 MG tablet Take 1 tablet by mouth daily 90 tablet 0    simvastatin (ZOCOR) 20 MG tablet Take 1 tablet by mouth nightly 90 tablet 0    apixaban (ELIQUIS) 5 MG TABS tablet Take 1 tablet by mouth 2 times daily 180 tablet 0    Calcium Carbonate (CALCIUM 600 PO) Take 1 capsule by mouth daily       vitamin D 25 MCG (1000 UT) CAPS Take 1 capsule by mouth daily      aspirin 81 MG EC tablet Take 81 mg by mouth daily      BIOTIN PO Take 1,000 mcg by mouth daily       LUTEIN PO Take by mouth daily      MULTIPLE VITAMINS PO Take by mouth daily      meclizine (ANTIVERT) 12.5 MG tablet Take 1 tablet by mouth 3 times daily as needed for Dizziness (Patient not taking: Reported on 10/18/2021) 45 tablet 0     No facility-administered encounter medications on file as of 10/18/2021. Vincent Gayr was seen today for other and other. Diagnoses and all orders for this visit:    Contact dermatitis, unspecified contact dermatitis type, unspecified trigger  -     clobetasol (TEMOVATE) 0.05 % ointment;  Apply topically 2 times daily. -     cetirizine (ZYRTEC) 10 MG tablet; Take 1 tablet by mouth daily    Hypertension, essential         There are no Patient Instructions on file for this visit.            Darius Wright MD   10/18/21

## 2021-11-02 ASSESSMENT — ENCOUNTER SYMPTOMS
SHORTNESS OF BREATH: 0
COUGH: 0
VOMITING: 0
TROUBLE SWALLOWING: 0
SORE THROAT: 0

## 2021-11-26 ENCOUNTER — NURSE TRIAGE (OUTPATIENT)
Dept: OTHER | Facility: CLINIC | Age: 81
End: 2021-11-26

## 2021-11-26 NOTE — TELEPHONE ENCOUNTER
Received call from [facility] with [employer]. Brief description of triage:   Has been diagnosed with COVID 23, was diagnosed Tuesday, has a bad cold and headache, was wondering if the doctor reports it ti the health department or if she does      Triage indicates for patient to take care of at home    Care advice provided, patient verbalizes understanding; denies any other questions or concerns; instructed to call back for any new or worsening symptoms. Attention Provider: Thank you for allowing me to participate in the care of your patient. The patient was connected to triage in response to information provided to the [facility]/PSC. Please do not respond through this encounter as the response is not directed to a shared pool. Reason for Disposition   [1] COVID-19 diagnosed by positive lab test AND [2] mild symptoms (e.g., cough, fever, others) AND [2] no complications or SOB    Answer Assessment - Initial Assessment Questions  1. COVID-19 DIAGNOSIS: \"Who made your Coronavirus (COVID-19) diagnosis? \" \"Was it confirmed by a positive lab test?\" If not diagnosed by a HCP, ask \"Are there lots of cases (community spread) where you live? \" (See public health department website, if unsure)     At home test    2. COVID-19 EXPOSURE: \"Was there any known exposure to COVID before the symptoms began? \" CDC Definition of close contact: within 6 feet (2 meters) for a total of 15 minutes or more over a 24-hour period. Yes, someone she was with last friday    3. ONSET: \"When did the COVID-19 symptoms start? \"       States that they started late Monday    4. WORST SYMPTOM: \"What is your worst symptom? \" (e.g., cough, fever, shortness of breath, muscle aches)      Fatigue    5. COUGH: \"Do you have a cough? \" If Yes, ask: \"How bad is the cough? \"        Yes, has not coughed during conversation    6. FEVER: \"Do you have a fever? \" If Yes, ask: \"What is your temperature, how was it measured, and when did it start? \"      No    7. RESPIRATORY STATUS: \"Describe your breathing? \" (e.g., shortness of breath, wheezing, unable to speak)       No    8. BETTER-SAME-WORSE: Thiago Kohut you getting better, staying the same or getting worse compared to yesterday? \"  If getting worse, ask, \"In what way? \"      Better    9. HIGH RISK DISEASE: \"Do you have any chronic medical problems? \" (e.g., asthma, heart or lung disease, weak immune system, obesity, etc.)      A fib and high blood pressure    10. PREGNANCY: \"Is there any chance you are pregnant? \" \"When was your last menstrual period? \"        N/a    11. OTHER SYMPTOMS: \"Do you have any other symptoms? \"  (e.g., chills, fatigue, headache, loss of smell or taste, muscle pain, sore throat; new loss of smell or taste especially support the diagnosis of COVID-19)        Slight headache    Protocols used: COVID-19 - DIAGNOSED OR SUSPECTED-ADULT-

## 2021-11-29 ENCOUNTER — TELEPHONE (OUTPATIENT)
Dept: PRIMARY CARE CLINIC | Age: 81
End: 2021-11-29

## 2021-11-29 NOTE — TELEPHONE ENCOUNTER
----- Message from Pal Perry sent at 11/29/2021  8:36 AM EST -----  Subject: Message to Provider    QUESTIONS  Information for Provider? Ras Fernández tested positive for Covid on 11-23, did   try to get in touch with A/H doctor on Friday to get medication but it   said voicemail not set up. She said nurse triage told her to take Tylenol   & stay hydrated, but it is not helping. Please have someone contact her.  ---------------------------------------------------------------------------  --------------  CALL BACK INFO  What is the best way for the office to contact you? OK to leave message on   voicemail  Preferred Call Back Phone Number? [phone]  ---------------------------------------------------------------------------  --------------  SCRIPT ANSWERS  Relationship to Patient?  Self

## 2021-11-29 NOTE — TELEPHONE ENCOUNTER
Pt was exposed to covid last week and did an at home test tues after symptoms started Monday. Pt has had a cough congestion with mucous yellow in color and has tried otc medications that are not helping. Pt has had no fevers has had diarrhea, sore throat. is requesting medication to help her symptoms be sent to [facility]. Pt pulse ox has been ranging 88-92% but is not struggling and was advised to go to the er if anything below 90% and trouble breathing pt verbalized understanding.

## 2021-12-01 ENCOUNTER — TELEPHONE (OUTPATIENT)
Dept: PRIMARY CARE CLINIC | Age: 81
End: 2021-12-01

## 2021-12-02 NOTE — TELEPHONE ENCOUNTER
Physician requested information about covid testing- rapid vs pcr-    Pt advised she had taken a rapid- advised pt that the physician requested she has a pcr test completed- pt stated that if she was still feeling ill in the morning she was going to the hospital      Recommended the patient call back if she had pcr completed or advise if she went to er

## 2021-12-29 DIAGNOSIS — E78.2 HYPERLIPIDEMIA, MIXED: ICD-10-CM

## 2021-12-30 RX ORDER — SIMVASTATIN 20 MG
20 TABLET ORAL NIGHTLY
Qty: 90 TABLET | Refills: 0 | Status: SHIPPED
Start: 2021-12-30 | End: 2022-04-04 | Stop reason: SDUPTHER

## 2022-01-26 DIAGNOSIS — I10 HYPERTENSION, ESSENTIAL: ICD-10-CM

## 2022-01-26 RX ORDER — LISINOPRIL 20 MG/1
20 TABLET ORAL DAILY
Qty: 90 TABLET | Refills: 0 | Status: SHIPPED
Start: 2022-01-26 | End: 2022-04-27 | Stop reason: SDUPTHER

## 2022-01-26 RX ORDER — AMLODIPINE BESYLATE 5 MG/1
5 TABLET ORAL DAILY
Qty: 90 TABLET | Refills: 0 | Status: SHIPPED
Start: 2022-01-26 | End: 2022-04-27 | Stop reason: SDUPTHER

## 2022-04-04 DIAGNOSIS — I10 HYPERTENSION, ESSENTIAL: ICD-10-CM

## 2022-04-04 DIAGNOSIS — E78.2 HYPERLIPIDEMIA, MIXED: ICD-10-CM

## 2022-04-05 RX ORDER — SIMVASTATIN 20 MG
20 TABLET ORAL NIGHTLY
Qty: 90 TABLET | Refills: 0 | Status: SHIPPED
Start: 2022-04-05 | End: 2022-06-23 | Stop reason: SDUPTHER

## 2022-04-27 DIAGNOSIS — I10 HYPERTENSION, ESSENTIAL: ICD-10-CM

## 2022-04-28 RX ORDER — LISINOPRIL 20 MG/1
20 TABLET ORAL DAILY
Qty: 90 TABLET | Refills: 0 | Status: SHIPPED
Start: 2022-04-28 | End: 2022-07-19 | Stop reason: SDUPTHER

## 2022-04-28 RX ORDER — AMLODIPINE BESYLATE 5 MG/1
5 TABLET ORAL DAILY
Qty: 90 TABLET | Refills: 0 | Status: SHIPPED
Start: 2022-04-28 | End: 2022-06-11 | Stop reason: SDUPTHER

## 2022-06-11 DIAGNOSIS — I10 HYPERTENSION, ESSENTIAL: ICD-10-CM

## 2022-06-13 RX ORDER — AMLODIPINE BESYLATE 5 MG/1
5 TABLET ORAL DAILY
Qty: 90 TABLET | Refills: 0 | Status: SHIPPED
Start: 2022-06-13 | End: 2022-07-25 | Stop reason: SDUPTHER

## 2022-06-23 DIAGNOSIS — E78.2 HYPERLIPIDEMIA, MIXED: ICD-10-CM

## 2022-06-23 RX ORDER — SIMVASTATIN 20 MG
20 TABLET ORAL NIGHTLY
Qty: 90 TABLET | Refills: 0 | Status: SHIPPED
Start: 2022-06-23 | End: 2022-07-25 | Stop reason: SDUPTHER

## 2022-07-18 DIAGNOSIS — I10 HYPERTENSION, ESSENTIAL: ICD-10-CM

## 2022-07-19 RX ORDER — LISINOPRIL 20 MG/1
20 TABLET ORAL DAILY
Qty: 90 TABLET | Refills: 0 | Status: SHIPPED
Start: 2022-07-19 | End: 2022-09-26 | Stop reason: SDUPTHER

## 2022-07-21 PROBLEM — Z89.512: Status: ACTIVE | Noted: 2022-07-21

## 2022-07-25 ENCOUNTER — OFFICE VISIT (OUTPATIENT)
Dept: PRIMARY CARE CLINIC | Age: 82
End: 2022-07-25
Payer: MEDICARE

## 2022-07-25 VITALS
DIASTOLIC BLOOD PRESSURE: 80 MMHG | OXYGEN SATURATION: 97 % | WEIGHT: 190.2 LBS | HEART RATE: 74 BPM | SYSTOLIC BLOOD PRESSURE: 150 MMHG | HEIGHT: 62 IN | TEMPERATURE: 96.8 F | BODY MASS INDEX: 35 KG/M2

## 2022-07-25 DIAGNOSIS — E11.9 TYPE 2 DIABETES MELLITUS WITHOUT COMPLICATION, WITHOUT LONG-TERM CURRENT USE OF INSULIN (HCC): ICD-10-CM

## 2022-07-25 DIAGNOSIS — E78.2 HYPERLIPIDEMIA, MIXED: ICD-10-CM

## 2022-07-25 DIAGNOSIS — I10 HYPERTENSION, ESSENTIAL: Primary | ICD-10-CM

## 2022-07-25 LAB — HBA1C MFR BLD: 6.1 %

## 2022-07-25 PROCEDURE — 99213 OFFICE O/P EST LOW 20 MIN: CPT | Performed by: INTERNAL MEDICINE

## 2022-07-25 PROCEDURE — 3044F HG A1C LEVEL LT 7.0%: CPT | Performed by: INTERNAL MEDICINE

## 2022-07-25 PROCEDURE — 1123F ACP DISCUSS/DSCN MKR DOCD: CPT | Performed by: INTERNAL MEDICINE

## 2022-07-25 PROCEDURE — 83036 HEMOGLOBIN GLYCOSYLATED A1C: CPT | Performed by: INTERNAL MEDICINE

## 2022-07-25 RX ORDER — VALACYCLOVIR HYDROCHLORIDE 1 G/1
TABLET, FILM COATED ORAL
COMMUNITY
End: 2022-09-22

## 2022-07-25 RX ORDER — SIMVASTATIN 20 MG
20 TABLET ORAL NIGHTLY
Qty: 90 TABLET | Refills: 0 | Status: SHIPPED | OUTPATIENT
Start: 2022-07-25

## 2022-07-25 RX ORDER — AMLODIPINE BESYLATE 5 MG/1
7.5 TABLET ORAL DAILY
Qty: 90 TABLET | Refills: 0 | Status: SHIPPED | OUTPATIENT
Start: 2022-07-25

## 2022-07-25 ASSESSMENT — PATIENT HEALTH QUESTIONNAIRE - PHQ9
SUM OF ALL RESPONSES TO PHQ9 QUESTIONS 1 & 2: 0
SUM OF ALL RESPONSES TO PHQ QUESTIONS 1-9: 0
1. LITTLE INTEREST OR PLEASURE IN DOING THINGS: 0
SUM OF ALL RESPONSES TO PHQ QUESTIONS 1-9: 0
2. FEELING DOWN, DEPRESSED OR HOPELESS: 0
SUM OF ALL RESPONSES TO PHQ QUESTIONS 1-9: 0
SUM OF ALL RESPONSES TO PHQ QUESTIONS 1-9: 0

## 2022-07-25 NOTE — PROGRESS NOTES
Chief Complaint   Patient presents with    Follow-up     Follow up from October 2021   no labs or tests since last visit. 1 wants to discuss plentify,  restarted wt watchers,   2. Occasionally vertigo and goes away with excercises. Other     Care gaps-  awv scheduled, due for bone dexa scan,  due for pneumonia vaccine       HPI:  Patient is here for follow-up  Patient feels well compliant on medications she does not check her fingerstick blood sugar at home today hemoglobin A1c 6.1  Patient had no lab work since last year      Past Medical History, Surgical History, and Family History has been reviewed and updated.     Review of Systems:  Constitutional:  No fever, no fatigue, no chills, no headaches, no weight change  Dermatology:  No rash, no mole, no dry or sensitive skin  ENT:  No cough, no sore throat, no sinus pain, no runny nose, no ear pain  Cardiology:  No chest pain, no palpitations, no leg edema, no shortness of breath, no PND  Gastroenterology:  No dysphagia, no abdominal pain, no nausea, no vomiting, no constipation, no diarrhea, no heartburn  Musculoskeletal:  No joint pain, no leg cramps, no back pain, no muscle aches  Respiratory:  No shortness of breath, no orthopnea, no wheezing, no ASENCIO, no hemoptysis  Urology:  No blood in the urine, no urinary frequency, no urinary incontinence, no urinary urgency, no nocturia, no dysuria    Vitals:    07/25/22 1311 07/25/22 1327   BP: (!) 170/90 (!) 150/80   Site: Left Upper Arm    Position: Sitting    Cuff Size: Large Adult    Pulse: 74    Temp: 96.8 °F (36 °C)    SpO2: 97%    Weight: 190 lb 3.2 oz (86.3 kg)    Height: 5' 2\" (1.575 m)        General:  Patient alert and oriented x 3, NAD, pleasant  HEENT:  Atraumatic, normocephalic, PERRLA, EOMI, clear conjunctiva, TMs clear, nose-clear, throat - no erythema  Neck:  Supple, no goiter, no carotid bruits, no LAD  Lungs:  CTA   Heart:  RRR, + systolic ejection murmur at the right sternal border radiating to the left., gallops or rubs  Abdomen:  Soft/nt/nd, + bowel sounds  Extremities:  No clubbing, cyanosis or edema  Skin: unremarkable    Hemoglobin A1C   Date Value Ref Range Status   11/05/2020 6.3 (H) 4.0 - 5.6 % Final     Cholesterol, Total   Date Value Ref Range Status   12/07/2019 167 0 - 199 mg/dL Final     Triglycerides   Date Value Ref Range Status   12/07/2019 65 0 - 149 mg/dL Final     HDL   Date Value Ref Range Status   12/07/2019 82 >40 mg/dL Final     LDL Calculated   Date Value Ref Range Status   12/07/2019 72 0 - 99 mg/dL Final     VLDL Cholesterol Calculated   Date Value Ref Range Status   12/07/2019 13 mg/dL Final     Sodium   Date Value Ref Range Status   01/23/2021 139 132 - 146 mmol/L Final     Potassium   Date Value Ref Range Status   01/23/2021 3.7 3.5 - 5.0 mmol/L Final     Chloride   Date Value Ref Range Status   01/23/2021 108 (H) 98 - 107 mmol/L Final     CO2   Date Value Ref Range Status   01/23/2021 25 22 - 29 mmol/L Final     BUN   Date Value Ref Range Status   01/23/2021 11 8 - 23 mg/dL Final     Creatinine   Date Value Ref Range Status   01/23/2021 0.6 0.5 - 1.0 mg/dL Final     Glucose   Date Value Ref Range Status   01/23/2021 97 74 - 99 mg/dL Final     Calcium   Date Value Ref Range Status   01/23/2021 8.4 (L) 8.6 - 10.2 mg/dL Final     Total Protein   Date Value Ref Range Status   01/18/2021 7.3 6.4 - 8.3 g/dL Final     Albumin   Date Value Ref Range Status   01/18/2021 3.8 3.5 - 5.2 g/dL Final     Total Bilirubin   Date Value Ref Range Status   01/18/2021 0.3 0.0 - 1.2 mg/dL Final     Alkaline Phosphatase   Date Value Ref Range Status   01/18/2021 91 35 - 104 U/L Final     AST   Date Value Ref Range Status   01/18/2021 20 0 - 31 U/L Final     Comment:     Specimen is slightly Hemolyzed. Result may be artificially increased.      ALT   Date Value Ref Range Status   01/18/2021 12 0 - 32 U/L Final     GFR Non-   Date Value Ref Range Status   01/23/2021 >60 >=60 mL/min/1.73 Final     Comment:     Chronic Kidney Disease: less than 60 ml/min/1.73 sq.m. Kidney Failure: less than 15 ml/min/1.73 sq.m. Results valid for patients 18 years and older. GFR    Date Value Ref Range Status   01/23/2021 >60  Final        No results found. Assessment/Plan:    Essential hypertension uncontrolled. Increase amlodipine to 7.5 mg daily  Atrial fibrillation controlled patient is anticoagulated on Eliquis  Diabetes mellitus type 2 controlled    Outpatient Encounter Medications as of 7/25/2022   Medication Sig Dispense Refill    valACYclovir (VALTREX) 1 g tablet valacyclovir 1 gram tablet      amLODIPine (NORVASC) 5 MG tablet Take 1.5 tablets by mouth in the morning. 90 tablet 0    simvastatin (ZOCOR) 20 MG tablet Take 1 tablet by mouth nightly 90 tablet 0    lisinopril (PRINIVIL;ZESTRIL) 20 MG tablet Take 1 tablet by mouth in the morning. 90 tablet 0    apixaban (ELIQUIS) 5 MG TABS tablet Take 1 tablet by mouth 2 times daily 180 tablet 0    Calcium Carbonate (CALCIUM 600 PO) Take 1 capsule by mouth daily       vitamin D 25 MCG (1000 UT) CAPS Take 1 capsule by mouth daily      aspirin 81 MG EC tablet Take 81 mg by mouth daily      BIOTIN PO Take 1,000 mcg by mouth daily       LUTEIN PO Take by mouth daily      [DISCONTINUED] simvastatin (ZOCOR) 20 MG tablet Take 1 tablet by mouth nightly 90 tablet 0    [DISCONTINUED] amLODIPine (NORVASC) 5 MG tablet Take 1 tablet by mouth daily 90 tablet 0    [DISCONTINUED] clobetasol (TEMOVATE) 0.05 % ointment Apply topically 2 times daily. 30 g 1    [DISCONTINUED] meclizine (ANTIVERT) 12.5 MG tablet Take 1 tablet by mouth 3 times daily as needed for Dizziness (Patient not taking: Reported on 10/18/2021) 45 tablet 0    [DISCONTINUED] MULTIPLE VITAMINS PO Take by mouth daily       No facility-administered encounter medications on file as of 7/25/2022. Cece Ceron was seen today for follow-up and other.     Diagnoses and all orders for this visit:    Hypertension, essential  -     amLODIPine (NORVASC) 5 MG tablet; Take 1.5 tablets by mouth in the morning.  -     Comprehensive Metabolic Panel; Future  -     Urinalysis; Future  -     CBC with Auto Differential; Future    Hyperlipidemia, mixed  -     simvastatin (ZOCOR) 20 MG tablet; Take 1 tablet by mouth nightly  -     LIPID PANEL; Future    Type 2 diabetes mellitus without complication, without long-term current use of insulin (HCC)  -     Comprehensive Metabolic Panel; Future  -     Urinalysis; Future  -     POCT glycosylated hemoglobin (Hb A1C); Standing         There are no Patient Instructions on file for this visit.            Luzmaria Ellison MD   7/25/22

## 2022-08-08 SDOH — HEALTH STABILITY: PHYSICAL HEALTH: ON AVERAGE, HOW MANY DAYS PER WEEK DO YOU ENGAGE IN MODERATE TO STRENUOUS EXERCISE (LIKE A BRISK WALK)?: 3 DAYS

## 2022-08-08 SDOH — HEALTH STABILITY: PHYSICAL HEALTH: ON AVERAGE, HOW MANY MINUTES DO YOU ENGAGE IN EXERCISE AT THIS LEVEL?: 10 MIN

## 2022-08-08 ASSESSMENT — PATIENT HEALTH QUESTIONNAIRE - PHQ9
SUM OF ALL RESPONSES TO PHQ9 QUESTIONS 1 & 2: 0
1. LITTLE INTEREST OR PLEASURE IN DOING THINGS: 0
2. FEELING DOWN, DEPRESSED OR HOPELESS: 0
SUM OF ALL RESPONSES TO PHQ QUESTIONS 1-9: 0

## 2022-08-08 ASSESSMENT — LIFESTYLE VARIABLES
HOW MANY STANDARD DRINKS CONTAINING ALCOHOL DO YOU HAVE ON A TYPICAL DAY: 1
HOW MANY STANDARD DRINKS CONTAINING ALCOHOL DO YOU HAVE ON A TYPICAL DAY: 1 OR 2
HOW OFTEN DO YOU HAVE A DRINK CONTAINING ALCOHOL: 3
HOW OFTEN DO YOU HAVE A DRINK CONTAINING ALCOHOL: 2-4 TIMES A MONTH

## 2022-08-15 ENCOUNTER — OFFICE VISIT (OUTPATIENT)
Dept: PRIMARY CARE CLINIC | Age: 82
End: 2022-08-15
Payer: MEDICARE

## 2022-08-15 VITALS
HEART RATE: 73 BPM | SYSTOLIC BLOOD PRESSURE: 128 MMHG | DIASTOLIC BLOOD PRESSURE: 64 MMHG | TEMPERATURE: 97.1 F | OXYGEN SATURATION: 95 % | HEIGHT: 61 IN | BODY MASS INDEX: 35.42 KG/M2 | WEIGHT: 187.6 LBS

## 2022-08-15 DIAGNOSIS — Z00.00 INITIAL MEDICARE ANNUAL WELLNESS VISIT: Primary | ICD-10-CM

## 2022-08-15 PROCEDURE — 1123F ACP DISCUSS/DSCN MKR DOCD: CPT | Performed by: INTERNAL MEDICINE

## 2022-08-15 PROCEDURE — G0439 PPPS, SUBSEQ VISIT: HCPCS | Performed by: INTERNAL MEDICINE

## 2022-08-15 NOTE — PROGRESS NOTES
Medicare Annual Wellness Visit    Jacqueline Coley is here for Medicare AWV    Assessment & Plan   Initial Medicare annual wellness visit    Recommendations for Preventive Services Due: see orders and patient instructions/AVS.  Recommended screening schedule for the next 5-10 years is provided to the patient in written form: see Patient Instructions/AVS.     Return for Medicare Annual Wellness Visit in 1 year. Subjective       Patient's complete Health Risk Assessment and screening values have been reviewed and are found in Flowsheets. The following problems were reviewed today and where indicated follow up appointments were made and/or referrals ordered. Positive Risk Factor Screenings with Interventions:             General Health and ACP:  General  In general, how would you say your health is?: Very Good  In the past 7 days, have you experienced any of the following: New or Increased Pain, New or Increased Fatigue, Loneliness, Social Isolation, Stress or Anger?: No  Do you get the social and emotional support that you need?: Yes  Do you have a Living Will?: Yes    Advance Directives       Power of  Living Will ACP-Advance Directive ACP-Power of     Not on File Not on File Not on File Not on File        General Health Risk Interventions:  Patient is busy with animal welfare.  Has group of friends to socialise with  She wears a hearing AId    Health Habits/Nutrition:  Physical Activity: Insufficiently Active    Days of Exercise per Week: 3 days    Minutes of Exercise per Session: 10 min     Have you lost any weight without trying in the past 3 months?: No  Body mass index: (!) 35.44  Have you seen the dentist within the past year?: Yes  Health Habits/Nutrition Interventions:  Inadequate physical activity:  patient agrees to exercise for at least 150 minutes/week             Objective   Vitals:    08/15/22 1309   BP: 128/64   Site: Right Upper Arm   Position: Sitting   Cuff Size: Large Adult   Pulse: 73   Temp: 97.1 °F (36.2 °C)   SpO2: 95%   Weight: 187 lb 9.6 oz (85.1 kg)   Height: 5' 1\" (1.549 m)      Body mass index is 35.45 kg/m². Allergies   Allergen Reactions    Neosporin [Neomycin-Polymyxin-Gramicidin] Rash     Prior to Visit Medications    Medication Sig Taking? Authorizing Provider   valACYclovir (VALTREX) 1 g tablet valacyclovir 1 gram tablet Yes Historical Provider, MD   amLODIPine (NORVASC) 5 MG tablet Take 1.5 tablets by mouth in the morning. Yes uL Nevarez MD   simvastatin (ZOCOR) 20 MG tablet Take 1 tablet by mouth nightly Yes Lu Nevarez MD   lisinopril (PRINIVIL;ZESTRIL) 20 MG tablet Take 1 tablet by mouth in the morning.  Yes Lu Nevarez MD   apixaban (ELIQUIS) 5 MG TABS tablet Take 1 tablet by mouth 2 times daily Yes Lu Nevarez MD   Calcium Carbonate (CALCIUM 600 PO) Take 1 capsule by mouth daily  Yes Historical Provider, MD   vitamin D 25 MCG (1000 UT) CAPS Take 1 capsule by mouth daily Yes Historical Provider, MD   aspirin 81 MG EC tablet Take 81 mg by mouth daily Yes Historical Provider, MD   BIOTIN PO Take 1,000 mcg by mouth daily  Yes Historical Provider, MD   LUTEIN PO Take by mouth daily Yes Historical Provider, MD [place] (Including outside providers/suppliers regularly involved in providing care):   Patient Care Team:  Leslie Riddle MD as PCP - Kait Stroud MD as PCP - [facility] Empaneled Provider     Reviewed and updated this visit:  Tobacco  Allergies  Meds  Problems  Med Hx  Surg Hx  Soc Hx  Fam Hx

## 2022-09-07 ENCOUNTER — TELEPHONE (OUTPATIENT)
Dept: PRIMARY CARE CLINIC | Age: 82
End: 2022-09-07

## 2022-09-07 NOTE — TELEPHONE ENCOUNTER
This patient sent a message to the clinical staff regarding an appt. To be made as she is having periodical black outs and states these are normal for her. This pt. Called at home and answering machine obtained, pt advised if these continues to happen to go to ER to be evaluated. Left message for pt to return office call.

## 2022-09-08 ENCOUNTER — APPOINTMENT (OUTPATIENT)
Dept: GENERAL RADIOLOGY | Age: 82
End: 2022-09-08
Payer: MEDICARE

## 2022-09-08 ENCOUNTER — HOSPITAL ENCOUNTER (EMERGENCY)
Age: 82
Discharge: HOME | End: 2022-09-08
Attending: EMERGENCY MEDICINE
Payer: MEDICARE

## 2022-09-08 ENCOUNTER — APPOINTMENT (OUTPATIENT)
Dept: CT IMAGING | Age: 82
End: 2022-09-08
Payer: MEDICARE

## 2022-09-08 VITALS
WEIGHT: 185 LBS | TEMPERATURE: 98.2 F | BODY MASS INDEX: 34.96 KG/M2 | HEART RATE: 79 BPM | SYSTOLIC BLOOD PRESSURE: 107 MMHG | OXYGEN SATURATION: 96 % | DIASTOLIC BLOOD PRESSURE: 62 MMHG | RESPIRATION RATE: 19 BRPM

## 2022-09-08 DIAGNOSIS — R40.4 TRANSIENT ALTERATION OF AWARENESS: Primary | ICD-10-CM

## 2022-09-08 LAB
ALBUMIN SERPL-MCNC: 3.7 G/DL (ref 3.5–5.2)
ALP BLD-CCNC: 82 U/L (ref 35–104)
ALT SERPL-CCNC: 11 U/L (ref 0–32)
ANION GAP SERPL CALCULATED.3IONS-SCNC: 11 MMOL/L (ref 7–16)
AST SERPL-CCNC: 24 U/L (ref 0–31)
BACTERIA: ABNORMAL /HPF
BASOPHILS ABSOLUTE: 0.04 E9/L (ref 0–0.2)
BASOPHILS RELATIVE PERCENT: 0.5 % (ref 0–2)
BILIRUB SERPL-MCNC: 0.3 MG/DL (ref 0–1.2)
BILIRUBIN URINE: NEGATIVE
BLOOD, URINE: NEGATIVE
BUN BLDV-MCNC: 15 MG/DL (ref 6–23)
CALCIUM SERPL-MCNC: 9.1 MG/DL (ref 8.6–10.2)
CHLORIDE BLD-SCNC: 106 MMOL/L (ref 98–107)
CLARITY: CLEAR
CO2: 25 MMOL/L (ref 22–29)
COLOR: YELLOW
CREAT SERPL-MCNC: 0.6 MG/DL (ref 0.5–1)
EOSINOPHILS ABSOLUTE: 0.22 E9/L (ref 0.05–0.5)
EOSINOPHILS RELATIVE PERCENT: 2.7 % (ref 0–6)
EPITHELIAL CELLS, UA: ABNORMAL /HPF
GFR AFRICAN AMERICAN: >60
GFR NON-AFRICAN AMERICAN: >60 ML/MIN/1.73
GLUCOSE BLD-MCNC: 97 MG/DL (ref 74–99)
GLUCOSE URINE: NEGATIVE MG/DL
HCT VFR BLD CALC: 40.1 % (ref 34–48)
HEMOGLOBIN: 12.7 G/DL (ref 11.5–15.5)
IMMATURE GRANULOCYTES #: 0.02 E9/L
IMMATURE GRANULOCYTES %: 0.2 % (ref 0–5)
KETONES, URINE: NEGATIVE MG/DL
LEUKOCYTE ESTERASE, URINE: NEGATIVE
LYMPHOCYTES ABSOLUTE: 2.76 E9/L (ref 1.5–4)
LYMPHOCYTES RELATIVE PERCENT: 34.5 % (ref 20–42)
MAGNESIUM: 2.3 MG/DL (ref 1.6–2.6)
MCH RBC QN AUTO: 29.8 PG (ref 26–35)
MCHC RBC AUTO-ENTMCNC: 31.7 % (ref 32–34.5)
MCV RBC AUTO: 94.1 FL (ref 80–99.9)
MONOCYTES ABSOLUTE: 0.69 E9/L (ref 0.1–0.95)
MONOCYTES RELATIVE PERCENT: 8.6 % (ref 2–12)
NEUTROPHILS ABSOLUTE: 4.28 E9/L (ref 1.8–7.3)
NEUTROPHILS RELATIVE PERCENT: 53.5 % (ref 43–80)
NITRITE, URINE: NEGATIVE
PDW BLD-RTO: 13.2 FL (ref 11.5–15)
PH UA: 7 (ref 5–9)
PLATELET # BLD: 280 E9/L (ref 130–450)
PMV BLD AUTO: 10.8 FL (ref 7–12)
POTASSIUM SERPL-SCNC: 4.4 MMOL/L (ref 3.5–5)
PRO-BNP: 135 PG/ML (ref 0–450)
PROTEIN UA: NEGATIVE MG/DL
RBC # BLD: 4.26 E12/L (ref 3.5–5.5)
RBC UA: ABNORMAL /HPF (ref 0–2)
REASON FOR REJECTION: NORMAL
REJECTED TEST: NORMAL
SODIUM BLD-SCNC: 142 MMOL/L (ref 132–146)
SPECIFIC GRAVITY UA: 1.02 (ref 1–1.03)
TOTAL PROTEIN: 7.1 G/DL (ref 6.4–8.3)
TROPONIN, HIGH SENSITIVITY: 9 NG/L (ref 0–9)
UROBILINOGEN, URINE: 0.2 E.U./DL
WBC # BLD: 8 E9/L (ref 4.5–11.5)
WBC UA: ABNORMAL /HPF (ref 0–5)

## 2022-09-08 PROCEDURE — 85025 COMPLETE CBC W/AUTO DIFF WBC: CPT

## 2022-09-08 PROCEDURE — 84484 ASSAY OF TROPONIN QUANT: CPT

## 2022-09-08 PROCEDURE — 99285 EMERGENCY DEPT VISIT HI MDM: CPT

## 2022-09-08 PROCEDURE — 83880 ASSAY OF NATRIURETIC PEPTIDE: CPT

## 2022-09-08 PROCEDURE — 36415 COLL VENOUS BLD VENIPUNCTURE: CPT

## 2022-09-08 PROCEDURE — 93005 ELECTROCARDIOGRAM TRACING: CPT | Performed by: STUDENT IN AN ORGANIZED HEALTH CARE EDUCATION/TRAINING PROGRAM

## 2022-09-08 PROCEDURE — 70450 CT HEAD/BRAIN W/O DYE: CPT

## 2022-09-08 PROCEDURE — 81001 URINALYSIS AUTO W/SCOPE: CPT

## 2022-09-08 PROCEDURE — 80053 COMPREHEN METABOLIC PANEL: CPT

## 2022-09-08 PROCEDURE — 71045 X-RAY EXAM CHEST 1 VIEW: CPT

## 2022-09-08 PROCEDURE — 83735 ASSAY OF MAGNESIUM: CPT

## 2022-09-08 ASSESSMENT — PAIN - FUNCTIONAL ASSESSMENT: PAIN_FUNCTIONAL_ASSESSMENT: NONE - DENIES PAIN

## 2022-09-08 NOTE — ED PROVIDER NOTES
---------------------------------------------  Past Medical History:  has a past medical history of At[facility] fibrillation ([facility] Cancer ([facility] Carotid stenosis, right, Diabetes mellitus ([facility] Heart murmur, Hx of radiation therapy, Hyperlipidemia, Hypertension, Major depressive disorder, Osteopenia, Osteoporosis, PONV (postoperative nausea and vomiting), Syncope, and TIA (transient ischemic attack). Past Surgical History:  has a past surgical history that includes amputation (Left, 1992); Breast surgery (Right, 2004); Colonoscopy; Hysterectomy; and IR CAROTID STENT W PROTECTION (1/22/2021). Social History:  reports that she quit smoking about 40 years ago. Her smoking use included cigarettes. She has never used smokeless tobacco. She reports current alcohol use. Family History: family history is not on file. The patients home medications have been reviewed. Allergies: Neosporin [neomycin-polymyxin-gramicidin]        ---------------------------------------------------PHYSICAL EXAM--------------------------------------    Constitutional/General: AAO to person/place/time/purpose, NAD, no labored breathing  Head: Normocephalic and atraumatic  Eyes: EOMI, PERRL, conjunctiva normal, sclera non icteric  Mouth: Moist mucous membranes, uvula midline  Neck: Supple, no stridor, no meningeal signs  Respiratory: Lungs clear to auscultation bilaterally, no wheezes, rales, or rhonchi. Not in respiratory distress  Cardiovascular:  Regular rate. Regular rhythm. No murmurs, no gallops, or rubs. 2+ distal pulses. Equal extremity pulses (except in left lower extremity which is amputated). Chest: No chest wall tenderness or deformity  GI:  Abdomen Soft, Non tender, Non distended. No rebound, guarding, or rigidity. No pulsatile masses. Musculoskeletal: Moves all extremities x 4 (but with left AKA) warm and well perfused, no clubbing, cyanosis, or edema. Capillary refill <3 seconds  Integument: skin warm and dry.  No 2. 3 1.6 - 2.6 mg/dL   Brain Natriuretic Peptide   Result Value Ref Range    Pro- 0 - 450 pg/mL   Urinalysis with Microscopic   Result Value Ref Range    Color, UA Yellow Straw/Yellow    Clarity, UA Clear Clear    Glucose, Ur Negative Negative mg/dL    Bilirubin Urine Negative Negative    Ketones, Urine Negative Negative mg/dL    Specific Gravity, UA 1.020 1.005 - 1.030    Blood, Urine Negative Negative    pH, UA 7.0 5.0 - 9.0    Protein, UA Negative Negative mg/dL    Urobilinogen, Urine 0.2 <2.0 E.U./dL    Nitrite, Urine Negative Negative    Leukocyte Esterase, Urine Negative Negative    WBC, UA 0-1 0 - 5 /HPF    RBC, UA NONE 0 - 2 /HPF    Epithelial Cells, UA FEW /HPF    Bacteria, UA FEW (A) None Seen /HPF   SPECIMEN REJECTION   Result Value Ref Range    Rejected Test trop     Reason for Rejection see below    Troponin   Result Value Ref Range    Troponin, High Sensitivity 9 0 - 9 ng/L   EKG 12 Lead   Result Value Ref Range    Ventricular Rate 74 BPM    Atrial Rate 74 BPM    P-R Interval 170 ms    QRS Duration 74 ms    Q-T Interval 404 ms    QTc Calculation (Bazett) 448 ms    P Axis 77 degrees    R Axis 12 degrees    T Axis 43 degrees       RADIOLOGY:  Interpreted by Radiologist unless otherwise specified  XR CHEST 1 VIEW   Final Result   No pneumonia or pleural effusion. CT Head WO Contrast   Final Result   No acute intracranial abnormality. EKG:    See ED Course for EKG documentation        ------------------------- NURSING NOTES AND VITALS REVIEWED ---------------------------   The nursing notes within the ED encounter and vital signs as below have been reviewed by myself. /62   Pulse 79   Temp 98.2 °F (36.8 °C)   Resp 19   Wt 185 lb (83.9 kg)   SpO2 96%   BMI 34.96 kg/m²   Oxygen Saturation Interpretation: Normal    The patients available past medical records and past encounters were reviewed.         ------------------------------ ED COURSE/MEDICAL DECISION MAKING----------------------  Medications - No data to display         Medical Decision Making: This is an 24-year-old female presenting to the emergency department for episodes of altered awareness, staring spells. Patient has not had any syncope, no chest pain or shortness of breath. She has not any falls or trauma. Patient with no confusion, awake, alert, oriented, well-appearing. Patient normotensive, afebrile, nontachycardic, well-appearing. Work-up here unremarkable and reassuring, no acute intracranial process, cardiac work-up unremarkable and reassuring. Differential diagnosis includes absence seizures versus narcolepsy. Shared decision-making was had with patient, see ED course for details. Patient will not be driving or operating a motor vehicle. Patient given strict return precautions, will follow up with neurology. See ED COURSE for additional MDM. Labs & imaging reviewed and interpreted, see RESULTS above. Re-Evaluations:             ED Course as of 09/09/22 0001   Thu Sep 08, 2022   1536 Patient's most recent echo in April 2021: Normal EF 55 to 60%, moderate aortic stenosis. [RH]   1632 EKG: This EKG is signed and interpreted by me. Rate: 74  Rhythm: Sinus  Interpretation: no acute changes  Comparison: stable as compared to patient's most recent EKG on 4/9/2021   [RH]   1902 Discussed case with Dr. Keyla Godwin, recommended speaking to neurology, neurology not available for discussion. will discuss with patient, have discussion and shared decision making,.  [RH]   1936 Reevaluated patient, she is sitting in bed comfortably with her son at this time. Discussed with patient and son results of labs and imaging and possible etiologies of her symptoms including sleep disorder/narcolepsy or absent seizures or other neurologic etiologies. Patient is currently not driving, has not been driving for some time according to son.   Discussed need to avoid driving or other situations in which it would be dangerous for her to have her spells. Patient does not wish to stay in the hospital, understands that she may be having these atypical seizures and that there is some risk associated with pursuing outpatient work-up. Discussed with patient activities to avoid and reasons to return to the emergency department including more frequent episodes, change in character of episodes and any syncopal or near syncopal symptoms. [RH]      ED Course User Index  [RH] [address], DO         This patient's ED course included: a personal history and physicial examination, re-evaluation prior to disposition, multiple bedside re-evaluations, IV medications, cardiac monitoring, and continuous pulse oximetry    This patient has remained hemodynamically stable during their ED course. Consultations:  See ED Course    Counseling: The emergency provider has spoken with the patient and family member patient and son and discussed todays results, in addition to providing specific details for the plan of care and counseling regarding the diagnosis and prognosis. Questions are answered at this time and they are agreeable with the plan.       --------------------------------- IMPRESSION AND DISPOSITION ---------------------------------    IMPRESSION  1. Transient alteration of awareness        DISPOSITION  Disposition: Discharge to home  Patient condition is stable    NOTE: This report was transcribed using voice recognition software. Every effort was made to ensure accuracy; however, inadvertent computerized transcription errors may be present. Also please note that patient was seen and examined by attending physician. Plan of care and disposition discussed with attending physician and they were immediately available or present for all procedures performed.        -- Tanya Monreal D.O. PGY-3     Resident Physician     Emergency Medicine      9/9/2022 12:01 AM         [address], DO  Resident  09/09/22 0001

## 2022-09-09 ENCOUNTER — TELEPHONE (OUTPATIENT)
Dept: PRIMARY CARE CLINIC | Age: 82
End: 2022-09-09

## 2022-09-09 NOTE — TELEPHONE ENCOUNTER
Renay Sumner was seen in the ER on 9/8/22 for black outs. She would like a referral for neurology. The next available appointment to see you is on 9/22/22 which was scheduled.

## 2022-09-10 LAB
EKG ATRIAL RATE: 74 BPM
EKG P AXIS: 77 DEGREES
EKG P-R INTERVAL: 170 MS
EKG Q-T INTERVAL: 404 MS
EKG QRS DURATION: 74 MS
EKG QTC CALCULATION (BAZETT): 448 MS
EKG R AXIS: 12 DEGREES
EKG T AXIS: 43 DEGREES
EKG VENTRICULAR RATE: 74 BPM

## 2022-09-13 ENCOUNTER — TELEPHONE (OUTPATIENT)
Dept: PRIMARY CARE CLINIC | Age: 82
End: 2022-09-13

## 2022-09-15 ENCOUNTER — TELEPHONE (OUTPATIENT)
Dept: PRIMARY CARE CLINIC | Age: 82
End: 2022-09-15

## 2022-09-15 DIAGNOSIS — R40.4 TRANSIENT ALTERATION OF AWARENESS: Primary | ICD-10-CM

## 2022-09-15 NOTE — TELEPHONE ENCOUNTER
Patient called to inquire if a referral to a different neurologist was going to be made. Explained Dr. Tereso Real wants to evaluate her in the office and discuss this with her before making that decision. Explained she is on a cancellation list to be called in for appt otherwise she is to keep her appt on Sept 22. Patient then states that last Tuesday 9/13 she tested positive for covid with symptoms same day of cold like nasal congestion and foggy thinking. Patient states she is wanting the antiviral or antimicrobial injection if the doctor could order it. Please advise.

## 2022-09-16 ENCOUNTER — TELEPHONE (OUTPATIENT)
Dept: PRIMARY CARE CLINIC | Age: 82
End: 2022-09-16

## 2022-09-16 DIAGNOSIS — U07.1 COVID-19: Primary | ICD-10-CM

## 2022-09-16 RX ORDER — DEXAMETHASONE 6 MG/1
6 TABLET ORAL
Qty: 7 TABLET | Refills: 0 | Status: SHIPPED
Start: 2022-09-16 | End: 2022-09-22 | Stop reason: ALTCHOICE

## 2022-09-16 RX ORDER — AZITHROMYCIN 250 MG/1
250 TABLET, FILM COATED ORAL SEE ADMIN INSTRUCTIONS
Qty: 6 TABLET | Refills: 0 | Status: SHIPPED | OUTPATIENT
Start: 2022-09-16 | End: 2022-09-21

## 2022-09-22 ENCOUNTER — OFFICE VISIT (OUTPATIENT)
Dept: PRIMARY CARE CLINIC | Age: 82
End: 2022-09-22
Payer: MEDICARE

## 2022-09-22 VITALS
DIASTOLIC BLOOD PRESSURE: 60 MMHG | SYSTOLIC BLOOD PRESSURE: 130 MMHG | WEIGHT: 185.1 LBS | OXYGEN SATURATION: 96 % | TEMPERATURE: 96.8 F | HEART RATE: 69 BPM | HEIGHT: 61 IN | BODY MASS INDEX: 34.95 KG/M2

## 2022-09-22 DIAGNOSIS — R40.4 EPISODIC ALTERED AWARENESS: Primary | ICD-10-CM

## 2022-09-22 DIAGNOSIS — U07.1 COVID-19: ICD-10-CM

## 2022-09-22 DIAGNOSIS — I48.0 PAROXYSMAL ATRIAL FIBRILLATION (HCC): ICD-10-CM

## 2022-09-22 PROCEDURE — 93000 ELECTROCARDIOGRAM COMPLETE: CPT | Performed by: INTERNAL MEDICINE

## 2022-09-22 PROCEDURE — 1123F ACP DISCUSS/DSCN MKR DOCD: CPT | Performed by: INTERNAL MEDICINE

## 2022-09-22 PROCEDURE — 99214 OFFICE O/P EST MOD 30 MIN: CPT | Performed by: INTERNAL MEDICINE

## 2022-09-22 SDOH — ECONOMIC STABILITY: FOOD INSECURITY: WITHIN THE PAST 12 MONTHS, THE FOOD YOU BOUGHT JUST DIDN'T LAST AND YOU DIDN'T HAVE MONEY TO GET MORE.: NEVER TRUE

## 2022-09-22 SDOH — ECONOMIC STABILITY: FOOD INSECURITY: WITHIN THE PAST 12 MONTHS, YOU WORRIED THAT YOUR FOOD WOULD RUN OUT BEFORE YOU GOT MONEY TO BUY MORE.: NEVER TRUE

## 2022-09-22 ASSESSMENT — SOCIAL DETERMINANTS OF HEALTH (SDOH): HOW HARD IS IT FOR YOU TO PAY FOR THE VERY BASICS LIKE FOOD, HOUSING, MEDICAL CARE, AND HEATING?: NOT HARD AT ALL

## 2022-09-22 NOTE — PROGRESS NOTES
Chief Complaint   Patient presents with    Follow-up     From July 2022    with labs from Sept when seen in ED. 1.  Recently had covid. Is now testing negative from home. 2.  Had neuorology visit on Dec 22. Prefers someone else if possible. Other     Care gap-  due for dexa scan  . . would like to discuss if she should get the flu vaccine since she is just done with covid. HPI:  Patient is here for. #1 COVID infection started 9 days ago she tested at home was treated with Decadron patient was vaccinated prior to that she is now symptom-free. She tested herself 2 days ago for COVID-19 was negative. .  #2 patient has been having episodes of altered awareness  -She was driving her car over railroad track that she is quite familiar with she could not see the railroad tracks and then realized that she has passed it, she basically had no awareness for few seconds  -Another episode while playing cards with her friends started to call on her loud to to alert her to return she said she was alert but had no awareness of the situation for few seconds.  -Another episode while introducing a speaker that she is well familiar with in the gathering she suddenly did not remember the speaker's name and what the speaker does. Patient denied chest pain shortness of breath. Denied any warning prior to these episodes    Past Medical History, Surgical History, and Family History has been reviewed and updated.     Review of Systems:  Constitutional:  No fever, no fatigue, no chills, no headaches, no weight change  Dermatology:  No rash, no mole, no dry or sensitive skin  ENT:  No cough, no sore throat, no sinus pain, no runny nose, no ear pain  Cardiology:  No chest pain, no palpitations, no leg edema, no shortness of breath, no PND  Gastroenterology:  No dysphagia, no abdominal pain, no nausea, no vomiting, no constipation, no diarrhea, no heartburn  Musculoskeletal:  No joint pain, no leg cramps, no back pain, no muscle aches  Respiratory:  No shortness of breath, no orthopnea, no wheezing, no ASENCIO, no hemoptysis  Urology:  No blood in the urine, no urinary frequency, no urinary incontinence, no urinary urgency, no nocturia, no dysuria    Vitals:    09/22/22 0943   BP: 130/60   Site: Right Upper Arm   Position: Sitting   Cuff Size: Large Adult   Pulse: 69   Temp: 96.8 °F (36 °C)   SpO2: 96%   Weight: 185 lb 1.6 oz (84 kg)   Height: 5' 1\" (1.549 m)       General:  Patient alert and oriented x 3, NAD, pleasant  HEENT:  Atraumatic, normocephalic, PERRLA, EOMI, clear conjunctiva, TMs clear, nose-clear, throat - no erythema  Neck:  Supple, no goiter, no carotid bruits, no LAD  Lungs:  CTA   Heart:  RRR, no murmurs, gallops or rubs  Abdomen:  Soft/nt/nd, + bowel sounds  Extremities:  No clubbing, cyanosis or edema  Skin: unremarkable    Hemoglobin A1C   Date Value Ref Range Status   07/25/2022 6.1 % Final     Cholesterol, Total   Date Value Ref Range Status   12/07/2019 167 0 - 199 mg/dL Final     Triglycerides   Date Value Ref Range Status   12/07/2019 65 0 - 149 mg/dL Final     HDL   Date Value Ref Range Status   12/07/2019 82 >40 mg/dL Final     LDL Calculated   Date Value Ref Range Status   12/07/2019 72 0 - 99 mg/dL Final     VLDL Cholesterol Calculated   Date Value Ref Range Status   12/07/2019 13 mg/dL Final     Sodium   Date Value Ref Range Status   09/08/2022 142 132 - 146 mmol/L Final     Potassium   Date Value Ref Range Status   09/08/2022 4.4 3.5 - 5.0 mmol/L Final     Comment:     Specimen is moderately Hemolyzed. Result may be artificially increased.      Chloride   Date Value Ref Range Status   09/08/2022 106 98 - 107 mmol/L Final     CO2   Date Value Ref Range Status   09/08/2022 25 22 - 29 mmol/L Final     BUN   Date Value Ref Range Status   09/08/2022 15 6 - 23 mg/dL Final     Creatinine   Date Value Ref Range Status   09/08/2022 0.6 0.5 - 1.0 mg/dL Final     Glucose   Date Value Ref Range Status   09/08/2022 97 74 - 99 mg/dL Final     Calcium   Date Value Ref Range Status   09/08/2022 9.1 8.6 - 10.2 mg/dL Final     Total Protein   Date Value Ref Range Status   09/08/2022 7.1 6.4 - 8.3 g/dL Final     Albumin   Date Value Ref Range Status   09/08/2022 3.7 3.5 - 5.2 g/dL Final     Total Bilirubin   Date Value Ref Range Status   09/08/2022 0.3 0.0 - 1.2 mg/dL Final     Alkaline Phosphatase   Date Value Ref Range Status   09/08/2022 82 35 - 104 U/L Final     AST   Date Value Ref Range Status   09/08/2022 24 0 - 31 U/L Final     Comment:     Specimen is moderately Hemolyzed. Result may be artificially increased. ALT   Date Value Ref Range Status   09/08/2022 11 0 - 32 U/L Final     GFR Non-   Date Value Ref Range Status   09/08/2022 >60 >=60 mL/min/1.73 Final     Comment:     Chronic Kidney Disease: less than 60 ml/min/1.73 sq.m. Kidney Failure: less than 15 ml/min/1.73 sq.m. Results valid for patients 18 years and older. GFR    Date Value Ref Range Status   09/08/2022 >60  Final        CT Head WO Contrast    Result Date: 9/8/2022  EXAMINATION: CT OF THE HEAD WITHOUT CONTRAST  9/8/2022 4:40 pm TECHNIQUE: CT of the head was performed without the administration of intravenous contrast. Automated exposure control, iterative reconstruction, and/or weight based adjustment of the mA/kV was utilized to reduce the radiation dose to as low as reasonably achievable. COMPARISON: December 21, 2020 HISTORY: ORDERING SYSTEM PROVIDED HISTORY: Intermittent episodes of syncope, possible Absence Seizures/Staring Episodes TECHNOLOGIST PROVIDED HISTORY: Reason for exam:->Intermittent episodes of syncope, possible Absence Seizures/Staring Episodes Has a \"code stroke\" or \"stroke alert\" been called? ->No Decision Support Exception - unselect if not a suspected or confirmed emergency medical condition->Emergency Medical Condition (MA) FINDINGS: No acute intracranial hemorrhage or edema.   No abnormal extra-axial fluid collections. There is of hypoattenuation are present in white matter of both hemispheres suggesting chronic microvascular ischemia. No hydrocephalus. Minimal mucosal thickening involving bilateral maxillary sinuses. Mastoid air cells are clear. No acute intracranial abnormality. XR CHEST 1 VIEW    Result Date: 9/8/2022  EXAMINATION: ONE XRAY VIEW OF THE CHEST 9/8/2022 4:49 pm COMPARISON: January 8, 2021 HISTORY: ORDERING SYSTEM PROVIDED HISTORY: Intermittent episodes of syncope TECHNOLOGIST PROVIDED HISTORY: Reason for exam:->Intermittent episodes of syncope FINDINGS: No airspace opacity or pleural effusion. The heart is normal size. No pneumothorax. No free air beneath the hemidiaphragms. Postsurgical changes at level of lateral right chest and right axilla. No pneumonia or pleural effusion. Assessment/Plan:    -COVID-19 infection subsided  -Episodes of altered awareness rule out focal complex seizure, neurology consult requested. EEG  Patient to avoid operating a motor vehicles for now until neurology evaluation and work-up completed  -Atrial fibrillation  -Essential hypertension controlled  -Diabetes mellitus type 2 controlled    Outpatient Encounter Medications as of 9/22/2022   Medication Sig Dispense Refill    amLODIPine (NORVASC) 5 MG tablet Take 1.5 tablets by mouth in the morning. 90 tablet 0    simvastatin (ZOCOR) 20 MG tablet Take 1 tablet by mouth nightly 90 tablet 0    [DISCONTINUED] lisinopril (PRINIVIL;ZESTRIL) 20 MG tablet Take 1 tablet by mouth in the morning.  90 tablet 0    apixaban (ELIQUIS) 5 MG TABS tablet Take 1 tablet by mouth 2 times daily 180 tablet 0    vitamin D 25 MCG (1000 UT) CAPS Take 1 capsule by mouth daily      aspirin 81 MG EC tablet Take 81 mg by mouth daily      BIOTIN PO Take 1,000 mcg by mouth daily       LUTEIN PO Take by mouth daily      [DISCONTINUED] dexamethasone (DECADRON) 6 MG tablet Take 1 tablet by mouth daily (with breakfast) for 7 days 7 tablet 0    [DISCONTINUED] valACYclovir (VALTREX) 1 g tablet valacyclovir 1 gram tablet (Patient not taking: Reported on 9/22/2022)      Calcium Carbonate (CALCIUM 600 PO) Take 1 capsule by mouth daily  (Patient not taking: Reported on 9/22/2022)       No facility-administered encounter medications on file as of 9/22/2022. Ion Weiner was seen today for follow-up and other. Diagnoses and all orders for this visit:    Episodic altered awareness  -     EEG; Future    COVID-19    Paroxysmal atrial fibrillation (HCC)  -     EKG 12 Lead       There are no Patient Instructions on file for this visit.            Andrew Mejia MD   9/22/22

## 2022-09-26 DIAGNOSIS — I10 HYPERTENSION, ESSENTIAL: ICD-10-CM

## 2022-09-27 ENCOUNTER — HOSPITAL ENCOUNTER (OUTPATIENT)
Dept: NEUROLOGY | Age: 82
Discharge: HOME | End: 2022-09-27
Payer: MEDICARE

## 2022-09-27 DIAGNOSIS — R40.4 EPISODIC ALTERED AWARENESS: ICD-10-CM

## 2022-09-27 PROCEDURE — 95816 EEG AWAKE AND DROWSY: CPT

## 2022-09-27 RX ORDER — LISINOPRIL 20 MG/1
20 TABLET ORAL DAILY
Qty: 90 TABLET | Refills: 0 | Status: SHIPPED | OUTPATIENT
Start: 2022-09-27

## 2022-10-03 ENCOUNTER — PROCEDURE VISIT (OUTPATIENT)
Dept: NEUROLOGY | Age: 82
End: 2022-10-03
Payer: MEDICARE

## 2022-10-03 DIAGNOSIS — G45.9 TIA (TRANSIENT ISCHEMIC ATTACK): Primary | ICD-10-CM

## 2022-10-03 PROCEDURE — 99212 OFFICE O/P EST SF 10 MIN: CPT | Performed by: PSYCHIATRY & NEUROLOGY

## 2022-10-03 PROCEDURE — 1123F ACP DISCUSS/DSCN MKR DOCD: CPT | Performed by: PSYCHIATRY & NEUROLOGY

## 2022-10-04 NOTE — PROGRESS NOTES
EEG report    This 78year-old woman, on amlodipine, simvastatin, lisinopril, apixaban, aspirin, cholecalciferol and calcium supplements, displayed the following underlying rhythms---fairly well-organized, synchronous, 8 Hz, 20 to 30 µV alpha rhythms in both posterior regions. 20 Hz, 10 µV beta rhythms were noted in both precentral regions. There was symmetrical attenuation of the posterior alpha rhythms with eye opening. Hyperventilation was not performed. There was fair driving to photic stimulation, without abnormalities. The patient did become drowsy, progressing uneventfully through stage I of somnolence. Throughout this recording, there were no focal abnormalities or epileptiform discharges.     Impression-status-normal awake and drowsy EEG

## 2022-10-13 ENCOUNTER — TELEPHONE (OUTPATIENT)
Dept: PRIMARY CARE CLINIC | Age: 82
End: 2022-10-13

## 2022-10-31 LAB
ALBUMIN SERPL-MCNC: NORMAL G/DL
ALP BLD-CCNC: NORMAL U/L
ALT SERPL-CCNC: NORMAL U/L
ANION GAP SERPL CALCULATED.3IONS-SCNC: NORMAL MMOL/L
AST SERPL-CCNC: NORMAL U/L
BASOPHILS ABSOLUTE: NORMAL
BASOPHILS RELATIVE PERCENT: NORMAL
BILIRUB SERPL-MCNC: NORMAL MG/DL
BILIRUBIN, URINE: NORMAL
BLOOD, URINE: NORMAL
BUN BLDV-MCNC: NORMAL MG/DL
CALCIUM SERPL-MCNC: NORMAL MG/DL
CHLORIDE BLD-SCNC: NORMAL MMOL/L
CHOLESTEROL, TOTAL: NORMAL
CHOLESTEROL/HDL RATIO: NORMAL
CLARITY: NORMAL
CO2: NORMAL
COLOR: NORMAL
CREAT SERPL-MCNC: NORMAL MG/DL
EOSINOPHILS ABSOLUTE: NORMAL
EOSINOPHILS RELATIVE PERCENT: NORMAL
GFR CALCULATED: NORMAL
GLUCOSE BLD-MCNC: NORMAL MG/DL
GLUCOSE URINE: NORMAL
HCT VFR BLD CALC: NORMAL %
HDLC SERPL-MCNC: NORMAL MG/DL
HEMOGLOBIN: NORMAL
KETONES, URINE: NORMAL
LDL CHOLESTEROL CALCULATED: NORMAL
LEUKOCYTE ESTERASE, URINE: NORMAL
LYMPHOCYTES ABSOLUTE: NORMAL
LYMPHOCYTES RELATIVE PERCENT: NORMAL
MAMMOGRAPHY, EXTERNAL: NORMAL
MCH RBC QN AUTO: NORMAL PG
MCHC RBC AUTO-ENTMCNC: NORMAL G/DL
MCV RBC AUTO: NORMAL FL
MONOCYTES ABSOLUTE: NORMAL
MONOCYTES RELATIVE PERCENT: NORMAL
NEUTROPHILS ABSOLUTE: NORMAL
NEUTROPHILS RELATIVE PERCENT: NORMAL
NITRITE, URINE: NORMAL
NONHDLC SERPL-MCNC: NORMAL MG/DL
PDW BLD-RTO: NORMAL %
PH UA: NORMAL
PLATELET # BLD: NORMAL 10*3/UL
PMV BLD AUTO: NORMAL FL
POTASSIUM SERPL-SCNC: NORMAL MMOL/L
PROTEIN UA: NORMAL
RBC # BLD: NORMAL 10*6/UL
SODIUM BLD-SCNC: NORMAL MMOL/L
SPECIFIC GRAVITY, URINE: NORMAL
TOTAL PROTEIN: NORMAL
TRIGL SERPL-MCNC: NORMAL MG/DL
UROBILINOGEN, URINE: NORMAL
VLDLC SERPL CALC-MCNC: NORMAL MG/DL
WBC # BLD: NORMAL 10*3/UL

## 2022-11-01 DIAGNOSIS — E78.2 HYPERLIPIDEMIA, MIXED: ICD-10-CM

## 2022-11-01 DIAGNOSIS — E11.9 TYPE 2 DIABETES MELLITUS WITHOUT COMPLICATION, WITHOUT LONG-TERM CURRENT USE OF INSULIN (HCC): ICD-10-CM

## 2022-11-01 DIAGNOSIS — I10 HYPERTENSION, ESSENTIAL: ICD-10-CM

## 2022-11-07 ENCOUNTER — OFFICE VISIT (OUTPATIENT)
Dept: PRIMARY CARE CLINIC | Age: 82
End: 2022-11-07
Payer: MEDICARE

## 2022-11-07 VITALS
BODY MASS INDEX: 36.25 KG/M2 | HEART RATE: 74 BPM | SYSTOLIC BLOOD PRESSURE: 138 MMHG | OXYGEN SATURATION: 95 % | TEMPERATURE: 96.9 F | DIASTOLIC BLOOD PRESSURE: 60 MMHG | WEIGHT: 192 LBS | HEIGHT: 61 IN

## 2022-11-07 DIAGNOSIS — R40.4 TRANSIENT ALTERATION OF AWARENESS: Primary | ICD-10-CM

## 2022-11-07 DIAGNOSIS — E11.9 TYPE 2 DIABETES MELLITUS WITHOUT COMPLICATION, WITHOUT LONG-TERM CURRENT USE OF INSULIN (HCC): ICD-10-CM

## 2022-11-07 DIAGNOSIS — E78.2 HYPERLIPIDEMIA, MIXED: ICD-10-CM

## 2022-11-07 DIAGNOSIS — I10 HYPERTENSION, ESSENTIAL: ICD-10-CM

## 2022-11-07 DIAGNOSIS — I48.0 PAROXYSMAL ATRIAL FIBRILLATION (HCC): ICD-10-CM

## 2022-11-07 LAB
CHP ED QC CHECK: NORMAL
GLUCOSE BLD-MCNC: 145 MG/DL
HBA1C MFR BLD: 6.3 %

## 2022-11-07 PROCEDURE — 1123F ACP DISCUSS/DSCN MKR DOCD: CPT | Performed by: INTERNAL MEDICINE

## 2022-11-07 PROCEDURE — 99213 OFFICE O/P EST LOW 20 MIN: CPT | Performed by: INTERNAL MEDICINE

## 2022-11-07 PROCEDURE — 3074F SYST BP LT 130 MM HG: CPT | Performed by: INTERNAL MEDICINE

## 2022-11-07 PROCEDURE — 83036 HEMOGLOBIN GLYCOSYLATED A1C: CPT | Performed by: INTERNAL MEDICINE

## 2022-11-07 PROCEDURE — 3044F HG A1C LEVEL LT 7.0%: CPT | Performed by: INTERNAL MEDICINE

## 2022-11-07 PROCEDURE — 3078F DIAST BP <80 MM HG: CPT | Performed by: INTERNAL MEDICINE

## 2022-11-07 PROCEDURE — 82962 GLUCOSE BLOOD TEST: CPT | Performed by: INTERNAL MEDICINE

## 2022-11-07 RX ORDER — SIMVASTATIN 20 MG
20 TABLET ORAL NIGHTLY
Qty: 90 TABLET | Refills: 0 | Status: SHIPPED | OUTPATIENT
Start: 2022-11-07

## 2022-11-07 RX ORDER — AMLODIPINE BESYLATE 5 MG/1
7.5 TABLET ORAL DAILY
Qty: 90 TABLET | Refills: 0 | Status: SHIPPED | OUTPATIENT
Start: 2022-11-07

## 2022-11-07 RX ORDER — LEVETIRACETAM 250 MG/1
250 TABLET ORAL 2 TIMES DAILY
Qty: 60 TABLET | Refills: 0 | Status: SHIPPED | OUTPATIENT
Start: 2022-11-07

## 2022-11-07 NOTE — PROGRESS NOTES
Chief Complaint   Patient presents with    Follow-up     From Sept 2022  with labs and mammo    1. States she has stopped driving until at least  after she see's neurology in Dec.      Other     Care gaps-  due for dexa scan. HPI:  Patient is here for follow-up   . Patient scheduled to see neurology on December 2. She had another episode she was holding a cup of coffee with her left hand and suddenly felt her pants are wet and she realized that the cup she held by her left hand fell . She concluded that she has lost awareness for a few seconds. Lab work reviewed. Denied cardiopulmonary symptoms      Past Medical History, Surgical History, and Family History has been reviewed and updated.     Review of Systems:  Constitutional:  No fever, no fatigue, no chills, no headaches, no weight change  Dermatology:  No rash, no mole, no dry or sensitive skin  ENT:  No cough, no sore throat, no sinus pain, no runny nose, no ear pain  Cardiology:  No chest pain, no palpitations, no leg edema, no shortness of breath, no PND  Gastroenterology:  No dysphagia, no abdominal pain, no nausea, no vomiting, no constipation, no diarrhea, no heartburn  Musculoskeletal:  No joint pain, no leg cramps, no back pain, no muscle aches  Respiratory:  No shortness of breath, no orthopnea, no wheezing, no ASENCIO, no hemoptysis  Urology:  No blood in the urine, no urinary frequency, no urinary incontinence, no urinary urgency, no nocturia, no dysuria    Vitals:    11/07/22 1347   BP: 138/60   Site: Right Upper Arm   Position: Sitting   Cuff Size: Large Adult   Pulse: 74   Temp: 96.9 °F (36.1 °C)   SpO2: 95%   Weight: 192 lb (87.1 kg)   Height: 5' 1\" (1.549 m)       General:  Patient alert and oriented x 3, NAD, pleasant  HEENT:  Atraumatic, normocephalic, PERRLA, EOMI, clear conjunctiva, TMs clear, nose-clear, throat - no erythema  Neck:  Supple, no goiter, no carotid bruits, no LAD  Lungs:  CTA   Heart:  RRR, + systolic ejection murmur, gallops or rubs  Abdomen:  Soft/nt/nd, + bowel sounds  Lymph node examination: unremarkable  Neurological exam : unremarkable  Extremities:  No clubbing, cyanosis or edema  Skin: unremarkable    Hemoglobin A1C   Date Value Ref Range Status   07/25/2022 6.1 % Final     Cholesterol, Total   Date Value Ref Range Status   12/07/2019 167 0 - 199 mg/dL Final     Triglycerides   Date Value Ref Range Status   12/07/2019 65 0 - 149 mg/dL Final     HDL   Date Value Ref Range Status   12/07/2019 82 >40 mg/dL Final     LDL Calculated   Date Value Ref Range Status   12/07/2019 72 0 - 99 mg/dL Final     VLDL Cholesterol Calculated   Date Value Ref Range Status   12/07/2019 13 mg/dL Final     Sodium   Date Value Ref Range Status   09/08/2022 142 132 - 146 mmol/L Final     Potassium   Date Value Ref Range Status   09/08/2022 4.4 3.5 - 5.0 mmol/L Final     Comment:     Specimen is moderately Hemolyzed. Result may be artificially increased. Chloride   Date Value Ref Range Status   09/08/2022 106 98 - 107 mmol/L Final     CO2   Date Value Ref Range Status   09/08/2022 25 22 - 29 mmol/L Final     BUN   Date Value Ref Range Status   09/08/2022 15 6 - 23 mg/dL Final     Creatinine   Date Value Ref Range Status   09/08/2022 0.6 0.5 - 1.0 mg/dL Final     Glucose   Date Value Ref Range Status   11/07/2022 145 mg/dL Final     Calcium   Date Value Ref Range Status   09/08/2022 9.1 8.6 - 10.2 mg/dL Final     Total Protein   Date Value Ref Range Status   09/08/2022 7.1 6.4 - 8.3 g/dL Final     Albumin   Date Value Ref Range Status   09/08/2022 3.7 3.5 - 5.2 g/dL Final     Total Bilirubin   Date Value Ref Range Status   09/08/2022 0.3 0.0 - 1.2 mg/dL Final     Alkaline Phosphatase   Date Value Ref Range Status   09/08/2022 82 35 - 104 U/L Final     AST   Date Value Ref Range Status   09/08/2022 24 0 - 31 U/L Final     Comment:     Specimen is moderately Hemolyzed. Result may be artificially increased.      ALT   Date Value Ref Range Status 09/08/2022 11 0 - 32 U/L Final     GFR Non-   Date Value Ref Range Status   09/08/2022 >60 >=60 mL/min/1.73 Final     Comment:     Chronic Kidney Disease: less than 60 ml/min/1.73 sq.m. Kidney Failure: less than 15 ml/min/1.73 sq.m. Results valid for patients 18 years and older. GFR    Date Value Ref Range Status   09/08/2022 >60  Final        No results found. Assessment/Plan:   Episodes of altered awareness rule out petit mal seizures. Discussed with the patient in view of recurrent episodes we will start Keppra 250 mg tablet twice daily pending neurology evaluation. Diabetes mellitus type 2 controlled  Paroxysmal atrial fibrillation      Outpatient Encounter Medications as of 11/7/2022   Medication Sig Dispense Refill    levETIRAcetam (KEPPRA) 250 MG tablet Take 1 tablet by mouth 2 times daily 60 tablet 0    simvastatin (ZOCOR) 20 MG tablet Take 1 tablet by mouth nightly 90 tablet 0    amLODIPine (NORVASC) 5 MG tablet Take 1.5 tablets by mouth daily 90 tablet 0    apixaban (ELIQUIS) 5 MG TABS tablet Take 1 tablet by mouth 2 times daily 180 tablet 0    lisinopril (PRINIVIL;ZESTRIL) 20 MG tablet Take 1 tablet by mouth daily 90 tablet 0    vitamin D 25 MCG (1000 UT) CAPS Take 1 capsule by mouth daily      aspirin 81 MG EC tablet Take 81 mg by mouth daily      BIOTIN PO Take 1,000 mcg by mouth daily       LUTEIN PO Take by mouth daily      [DISCONTINUED] amLODIPine (NORVASC) 5 MG tablet Take 1.5 tablets by mouth in the morning. 90 tablet 0    [DISCONTINUED] simvastatin (ZOCOR) 20 MG tablet Take 1 tablet by mouth nightly 90 tablet 0    [DISCONTINUED] apixaban (ELIQUIS) 5 MG TABS tablet Take 1 tablet by mouth 2 times daily 180 tablet 0    Calcium Carbonate (CALCIUM 600 PO) Take 1 capsule by mouth daily  (Patient not taking: No sig reported)       No facility-administered encounter medications on file as of 11/7/2022.         Tawny Martinez was seen today for follow-up and other. Diagnoses and all orders for this visit:    Transient alteration of awareness  -     levETIRAcetam (KEPPRA) 250 MG tablet; Take 1 tablet by mouth 2 times daily    Type 2 diabetes mellitus without complication, without long-term current use of insulin (HCC)  -     POCT Glucose  -     POCT glycosylated hemoglobin (Hb A1C)    Hyperlipidemia, mixed  -     simvastatin (ZOCOR) 20 MG tablet; Take 1 tablet by mouth nightly    Hypertension, essential  -     amLODIPine (NORVASC) 5 MG tablet; Take 1.5 tablets by mouth daily    Paroxysmal atrial fibrillation (HCC)  -     apixaban (ELIQUIS) 5 MG TABS tablet; Take 1 tablet by mouth 2 times daily         There are no Patient Instructions on file for this visit. On this date 11/7/2022 I have spent 25 minutes reviewing previous notes, test results and face to face with the patient discussing the diagnosis and importance of compliance with the treatment plan as well as documenting on the day of the visit.       Fátima Mark MD   11/7/22

## 2022-12-02 ENCOUNTER — OFFICE VISIT (OUTPATIENT)
Dept: NEUROLOGY | Age: 82
End: 2022-12-02
Payer: MEDICARE

## 2022-12-02 VITALS
HEIGHT: 61 IN | DIASTOLIC BLOOD PRESSURE: 90 MMHG | SYSTOLIC BLOOD PRESSURE: 150 MMHG | BODY MASS INDEX: 35.5 KG/M2 | WEIGHT: 188 LBS

## 2022-12-02 DIAGNOSIS — Z86.79 HISTORY OF ATRIAL FIBRILLATION: ICD-10-CM

## 2022-12-02 DIAGNOSIS — R41.0 TRANSIENT CONFUSION: ICD-10-CM

## 2022-12-02 DIAGNOSIS — R40.4 TRANSIENT ALTERATION OF AWARENESS: ICD-10-CM

## 2022-12-02 DIAGNOSIS — R55 PRE-SYNCOPE: Primary | ICD-10-CM

## 2022-12-02 PROCEDURE — 1123F ACP DISCUSS/DSCN MKR DOCD: CPT | Performed by: PSYCHIATRY & NEUROLOGY

## 2022-12-02 PROCEDURE — 3074F SYST BP LT 130 MM HG: CPT | Performed by: PSYCHIATRY & NEUROLOGY

## 2022-12-02 PROCEDURE — 99212 OFFICE O/P EST SF 10 MIN: CPT | Performed by: PSYCHIATRY & NEUROLOGY

## 2022-12-02 PROCEDURE — 3078F DIAST BP <80 MM HG: CPT | Performed by: PSYCHIATRY & NEUROLOGY

## 2022-12-02 ASSESSMENT — ENCOUNTER SYMPTOMS
GASTROINTESTINAL NEGATIVE: 1
ALLERGIC/IMMUNOLOGIC NEGATIVE: 1
EYES NEGATIVE: 1

## 2022-12-02 NOTE — PROGRESS NOTES
Neurology Consult Note:    Patient: Tona Coley  : 1940  Date: 22  Referring provider: Zach Becerra MD    Referral to Neurology: transient confusion, loss of awareness    Dear Zach Becerra MD:    Thank you for your referral of Deonte Tate an 26-year-old alert woman referred for evaluation of transient confusion, loss of awareness associated with urinary incontinence episode, recently recommended a trial of empiric levetiracetam 250 mg twice daily for seizure prophylaxis on 22 but not want to start the medication before she saw the neurologist.  She is accompanied by her son for today's appointment. She is not driving. There is no prior history of epilepsy, seizures or stroke. There is a history of paroxysmal atrial fibrillation treated with Eliquis. She has bilateral carotid stents. She has well-controlled type 2 diabetes mellitus, hyperlipidemia and essential hypertension. The first episode occurred when she drove into the back of the garage about 2 yrs. ago with transient lapse of consciousness. There is no hx of tongue biting. She denies post-episode confusion, gen. T-C activity, tremors, or falls. She denies palpitations, dizziness or shortness of breath and reports no warning symptoms before the onset of any of these brief confusional episodes. She described a recent episode in October where she was holding a cup of coffee with her left hand and suddenly felt her pants wet I realize that She was holding had fallen. She lost awareness for several seconds. She denied cardiopulmonary symptoms. Another event occurred when she couldn't recall a person's name last Sept. While at a shower at home, explains she \"saw black\" temporarily x secs. then recovered quickly adn was back to her normal self. There has been no falls or closed head injury. She didn't recognize her own pet cat on one occasion.      Routine EEG report, Dr. Jas Damico, 10/3/22, normal awake and drowsy EEG. Lab Data: Reviewed from 9/8/2022, blood glucose 145, 11/7/2022, hemoglobin A1c 6.3, 6.1    Imaging Data: [place] head CT, 9/8/2022: No acute intracranial abnormality. Primary care progress note reviewed from 11/7/2022.    9/22/2022, ECG, NSR. Current Outpatient Medications   Medication Sig Dispense Refill    simvastatin (ZOCOR) 20 MG tablet Take 1 tablet by mouth nightly 90 tablet 0    amLODIPine (NORVASC) 5 MG tablet Take 1.5 tablets by mouth daily 90 tablet 0    apixaban (ELIQUIS) 5 MG TABS tablet Take 1 tablet by mouth 2 times daily 180 tablet 0    lisinopril (PRINIVIL;ZESTRIL) 20 MG tablet Take 1 tablet by mouth daily 90 tablet 0    vitamin D 25 MCG (1000 UT) CAPS Take 1 capsule by mouth daily      aspirin 81 MG EC tablet Take 81 mg by mouth daily      BIOTIN PO Take 1,000 mcg by mouth daily       LUTEIN PO Take by mouth daily      levETIRAcetam (KEPPRA) 250 MG tablet Take 1 tablet by mouth 2 times daily (Patient not taking: Reported on 12/2/2022) 60 tablet 0    Calcium Carbonate (CALCIUM 600 PO) Take 1 capsule by mouth daily  (Patient not taking: No sig reported)       No current facility-administered medications for this visit.        Allergies   Allergen Reactions    Neosporin [Neomycin-Polymyxin-Gramicidin] Rash       Patient Active Problem List   Diagnosis    Carotid stenosis, symptomatic w/o infarct, right    Paroxysmal atrial fibrillation (HCC)    Hypertension, essential    TIA (transient ischemic attack)    Type 2 diabetes mellitus without complication (HCC)    Hyperlipidemia, mixed    History of breast cancer in female    Benign paroxysmal positional vertigo due to bilateral vestibular disorder    Hx of below knee amputation, left (HCC)    Transient alteration of awareness       Past Medical History:   Diagnosis Date    Atrial fibrillation (HCC)     Cancer (HCC)     breast, right    Carotid stenosis, right     Diabetes mellitus ([facility] [address]     Heart murmur     Hx of radiation therapy Hyperlipidemia     Hypertension     Major depressive disorder     Osteopenia     Osteoporosis     PONV (postoperative nausea and vomiting)     Syncope     TIA (transient ischemic attack)     Transient alteration of awareness 2022       Past Surgical History:   Procedure Laterality Date    AMPUTATION Left     BKA- wears prosthesis    BREAST SURGERY Right 2004    lumpectomy    COLONOSCOPY      HYSTERECTOMY (CERVIX STATUS UNKNOWN)      IR CAROTID STENT UNI W PROTECTION  2021    IR CAROTID STENT UNI W PROTECTION 2021 [facility]       No family history on file. No family hx epilepsy. Social History     Socioeconomic History    Marital status:      Spouse name: Not on file    Number of children: Not on file    Years of education: Not on file    Highest education level: Not on file   Occupational History    Not on file   Tobacco Use    Smoking status: Former     Types: Cigarettes     Quit date: 1982     Years since quittin.9    Smokeless tobacco: Never   Substance and Sexual Activity    Alcohol use: Yes     Comment: occ- twice a month    Drug use: Not on file    Sexual activity: Not on file   Other Topics Concern    Not on file   Social History Narrative    Not on file     Social Determinants of Health     Financial Resource Strain: Low Risk     Difficulty of Paying Living Expenses: Not hard at all   Food Insecurity: No Food Insecurity    Worried About Running Out of Food in the Last Year: Never true    [address] in the Last Year: Never true   Transportation Needs: Not on file   Physical Activity: Insufficiently Active    Days of Exercise per Week: 3 days    Minutes of Exercise per Session: 10 min   Stress: Not on file   Social Connections: Not on file   Intimate Partner Violence: Not on file   Housing Stability: Not on file     Review of Systems   Constitutional: Negative. HENT: Negative. Eyes: Negative.     Cardiovascular:         Hx of paroxysmal A. fib Gastrointestinal: Negative. Endocrine:        Hx type II DM   Genitourinary: Negative. Musculoskeletal: Negative. Skin: Negative. Allergic/Immunologic: Negative. Neurological:  Positive for seizures and syncope. Hematological: Negative. Psychiatric/Behavioral:  Positive for decreased concentration. The patient is nervous/anxious. All other systems reviewed and are negative. Neurologic Exam:  BP (!) 150/90 (Site: Left Upper Arm, Position: Sitting, Cuff Size: Medium Adult)   Ht 5' 1\" (1.549 m)   Wt 188 lb (85.3 kg)   BMI 35.52 kg/m²   General appearance: Alert, cooperative, anxious, well-nourished, or well-groomed, seated in the exam room next to her son, no acute distress. Uses a cane for ambulatory support. HEENT: Normocephalic/atraumatic. Neck: Supple, no carotid bruits or adventitious sounds heard  Cardiac: RRR  Respiratory: grossly clear  Extremities: No edema, erythema or cyanosis  Skin: No apparent lesions or rashes  Musculoskeletal: No fasciculations or tremors, no foot drop  Mental Status: Alert, oriented x3  Speech/Language: Clear, fluent  Attention span/Concentration: Grossly intact  Affect/Mood: Anxious  Insight/Judgement: Fairly good     Fund of Knowledge/Current events: Grossly intact  CN II-XII:     Pupils: Equal, reactive to light, 2.0 mm.     EOM's: Full without nystagmus  Visual Fields: Full to confrontation  Fundi: Miosis to light, unable to well visualize  CN V: normal V1-V3  CN VII: No facial droop  CN VIII: Hearing grossly intact  CN IX-XII: Tongue midline, no palatal asymmetry  SCM/Trapezii: 5/5 power  Motor: 5/5 power in the upper and lower extremities without tremor or drift and normal motor tone, intact fine motor function of both hands, symmetric, left left lower leg prosthesis. DTR's: 1+ in the upper extremities, trace right patellar, absent ankle jerk, no ankle clonus, rt. plantar response is flexor.   Sensory: Grossly intact subjectively to light touch and sharp stick testing. Coordination/Gait: No gross limb dysmetria on finger-to-nose testing. Favors left leg on ambulation with cane. Assessment/Plan:  1. History of recurrent transient confusional episodes lasting briefly for seconds, not followed by fatigue, confusion, or headache, without warning, and no generalized tonic-clonic activity or tongue biting. She did have 1 episode associated with urinary incontinence in October. Urinary incontinence by itself is not pathognomonic of a seizure may also occur with syncope. 2.  History of paroxysmal atrial fibrillation treated with Eliquis. 3. An MR brain scan is ordered without contrast and once resulted will receive her test results by phone. 4.  I would also suggest you consider ordering a cardiac event monitor to evaluate for paroxysmal atrial fibrillation, other dysrhythmia or sinus pause. 5.  She is not recommended to be driving at this time and is not doing so. 6.  She is encouraged to begin an empiric trial of levetiracetam 250 mg twice daily as previously ordered and if she has another episode then an increase to 500 mg twice daily was discussed. 7.  If the cardiac event monitor is unrevealing and she is not responding to levetiracetam then the next step would be to consider referral to [facility], Department of Epilepsy,  for second opinion evaluation and arrangements for a prolonged video-EEG study to assist with diagnosis. A cardiology evaluation may also be helpful. 8.  Follow-up in the Neurology clinic on an as needed basis otherwise as clinically indicated. Sincerely,      Meredith Smith MD    Neurology & Clinical Neurophysiology    This note was created using speech recognition transcription software. Despite proofreading, there may be several typographical errors present that may affect the meaning of the content. Please call with any questions. A total time of 40 mins.  was spent on the date of service in preparation for this visit, which included face-to-face patient care, completing clinical documentation, counseling and coordination of care based on clinical impression, neurologic diagnosis, review of pertinent imaging studies, test results, implementation and discussion of treatment plan, risk factor reduction and patient and/or family education.     Orders Placed This Encounter   Procedures    MRI BRAIN WO CONTRAST     Standing Status:   Future     Standing Expiration Date:   2/2/2023     Order Specific Question:   Reason for exam:     Answer:   evaluate for infarct, mass, hemorrhage

## 2022-12-07 ENCOUNTER — HOSPITAL ENCOUNTER (OUTPATIENT)
Dept: MRI IMAGING | Age: 82
Discharge: HOME | End: 2022-12-09
Payer: MEDICARE

## 2022-12-07 DIAGNOSIS — R41.0 TRANSIENT CONFUSION: ICD-10-CM

## 2022-12-07 DIAGNOSIS — R55 PRE-SYNCOPE: ICD-10-CM

## 2022-12-07 DIAGNOSIS — R40.4 TRANSIENT ALTERATION OF AWARENESS: ICD-10-CM

## 2022-12-07 DIAGNOSIS — Z86.79 HISTORY OF ATRIAL FIBRILLATION: ICD-10-CM

## 2022-12-07 PROCEDURE — 70551 MRI BRAIN STEM W/O DYE: CPT

## 2022-12-08 ENCOUNTER — TELEPHONE (OUTPATIENT)
Dept: NEUROLOGY | Age: 82
End: 2022-12-08

## 2022-12-08 NOTE — TELEPHONE ENCOUNTER
----- Message from Tiffani Hsu MD sent at 12/7/2022  2:40 PM EST -----  Notify pt MR brain scan shows mild age-related changes only

## 2022-12-30 DIAGNOSIS — I10 HYPERTENSION, ESSENTIAL: ICD-10-CM

## 2022-12-30 RX ORDER — LISINOPRIL 20 MG/1
TABLET ORAL
Qty: 90 TABLET | Refills: 0 | OUTPATIENT
Start: 2022-12-30

## 2023-01-09 DIAGNOSIS — I10 HYPERTENSION, ESSENTIAL: ICD-10-CM

## 2023-01-09 RX ORDER — LISINOPRIL 20 MG/1
20 TABLET ORAL DAILY
Qty: 90 TABLET | Refills: 0 | Status: CANCELLED | OUTPATIENT
Start: 2023-01-09

## 2023-01-11 ENCOUNTER — OFFICE VISIT (OUTPATIENT)
Dept: PRIMARY CARE CLINIC | Age: 83
End: 2023-01-11

## 2023-01-11 VITALS
DIASTOLIC BLOOD PRESSURE: 78 MMHG | HEART RATE: 84 BPM | SYSTOLIC BLOOD PRESSURE: 132 MMHG | WEIGHT: 195.6 LBS | BODY MASS INDEX: 36.93 KG/M2 | HEIGHT: 61 IN | OXYGEN SATURATION: 96 %

## 2023-01-11 DIAGNOSIS — R40.4 TRANSIENT ALTERATION OF AWARENESS: Primary | ICD-10-CM

## 2023-01-11 DIAGNOSIS — I10 HYPERTENSION, ESSENTIAL: ICD-10-CM

## 2023-01-11 RX ORDER — LISINOPRIL 20 MG/1
TABLET ORAL
Qty: 90 TABLET | Refills: 0 | OUTPATIENT
Start: 2023-01-11

## 2023-01-11 RX ORDER — LISINOPRIL 20 MG/1
20 TABLET ORAL DAILY
Qty: 90 TABLET | Refills: 0 | Status: SHIPPED | OUTPATIENT
Start: 2023-01-11

## 2023-01-11 RX ORDER — LEVETIRACETAM 500 MG/1
500 TABLET ORAL 2 TIMES DAILY
Qty: 180 TABLET | Refills: 0 | Status: SHIPPED | OUTPATIENT
Start: 2023-01-11

## 2023-01-11 ASSESSMENT — PATIENT HEALTH QUESTIONNAIRE - PHQ9
2. FEELING DOWN, DEPRESSED OR HOPELESS: 0
SUM OF ALL RESPONSES TO PHQ QUESTIONS 1-9: 0
1. LITTLE INTEREST OR PLEASURE IN DOING THINGS: 0
SUM OF ALL RESPONSES TO PHQ9 QUESTIONS 1 & 2: 0

## 2023-01-11 NOTE — LETTER
[address] [place] [address] PRIMARY CARE  [address] [place] [address] [address]. [facility] [address]  Phone: [phone]  Fax: Roxy Villalta MD         January 11, 2023     Patient: Michelle Coley   YOB: 1940   Date of Visit: 1/11/2023       To Whom It May Concern: It is my medical opinion that Мария Coley requires a disability parking placard . Duration of need: permanent    If you have any questions or concerns, please don't hesitate to call.     Sincerely,        Emeka Yoon MD

## 2023-01-11 NOTE — PROGRESS NOTES
Chief Complaint   Patient presents with    Follow-up     Syncope, need disability placard       HPI:  Patient is here for follow-up  Patient tolerated Keppra 250 mg twice a day had only 1 episode during the month she was on Keppra, she was evaluated by neurology who recommended increasing Keppra to 500 mg twice daily but in the meantime do a cardiac monitoring and take an opinion from [facility]. her neurologist closed her practice  Patient scheduled for cardiac monitor. Patient denied any cardiac symptoms    Past Medical History, Surgical History, and Family History has been reviewed and updated.     Review of Systems:  Constitutional:  No fever, no fatigue, no chills, no headaches, no weight change  Dermatology:  No rash, no mole, no dry or sensitive skin  ENT:  No cough, no sore throat, no sinus pain, no runny nose, no ear pain  Cardiology:  No chest pain, no palpitations, no leg edema, no shortness of breath, no PND  Gastroenterology:  No dysphagia, no abdominal pain, no nausea, no vomiting, no constipation, no diarrhea, no heartburn  Musculoskeletal:  No joint pain, no leg cramps, no back pain, no muscle aches  Respiratory:  No shortness of breath, no orthopnea, no wheezing, no ASENCIO, no hemoptysis  Urology:  No blood in the urine, no urinary frequency, no urinary incontinence, no urinary urgency, no nocturia, no dysuria    Vitals:    01/11/23 1321   BP: 132/78   Pulse: 84   SpO2: 96%   Weight: 195 lb 9.6 oz (88.7 kg)   Height: 5' 1\" (1.549 m)       General:  Patient alert and oriented x 3, NAD, pleasant  HEENT:  Atraumatic, normocephalic, PERRLA, EOMI, clear conjunctiva, TMs clear, nose-clear, throat - no erythema  Neck:  Supple, no goiter, no carotid bruits, no LAD  Lungs:  CTA   Heart:  RRR,+ systolic ejection murmur, no gallops or rubs  Abdomen:  Soft/nt/nd, + bowel sounds  Lymph node examination: unremarkable  Neurological exam : unremarkable  Extremities:  No clubbing, cyanosis or edema  Skin: unremarkable    Hemoglobin A1C   Date Value Ref Range Status   11/07/2022 6.3 % Final     Cholesterol, Total   Date Value Ref Range Status   12/07/2019 167 0 - 199 mg/dL Final     Triglycerides   Date Value Ref Range Status   12/07/2019 65 0 - 149 mg/dL Final     HDL   Date Value Ref Range Status   12/07/2019 82 >40 mg/dL Final     LDL Calculated   Date Value Ref Range Status   12/07/2019 72 0 - 99 mg/dL Final     VLDL Cholesterol Calculated   Date Value Ref Range Status   12/07/2019 13 mg/dL Final     Sodium   Date Value Ref Range Status   09/08/2022 142 132 - 146 mmol/L Final     Potassium   Date Value Ref Range Status   09/08/2022 4.4 3.5 - 5.0 mmol/L Final     Comment:     Specimen is moderately Hemolyzed. Result may be artificially increased. Chloride   Date Value Ref Range Status   09/08/2022 106 98 - 107 mmol/L Final     CO2   Date Value Ref Range Status   09/08/2022 25 22 - 29 mmol/L Final     BUN   Date Value Ref Range Status   09/08/2022 15 6 - 23 mg/dL Final     Creatinine   Date Value Ref Range Status   09/08/2022 0.6 0.5 - 1.0 mg/dL Final     Glucose   Date Value Ref Range Status   11/07/2022 145 mg/dL Final     Calcium   Date Value Ref Range Status   09/08/2022 9.1 8.6 - 10.2 mg/dL Final     Total Protein   Date Value Ref Range Status   09/08/2022 7.1 6.4 - 8.3 g/dL Final     Albumin   Date Value Ref Range Status   09/08/2022 3.7 3.5 - 5.2 g/dL Final     Total Bilirubin   Date Value Ref Range Status   09/08/2022 0.3 0.0 - 1.2 mg/dL Final     Alkaline Phosphatase   Date Value Ref Range Status   09/08/2022 82 35 - 104 U/L Final     AST   Date Value Ref Range Status   09/08/2022 24 0 - 31 U/L Final     Comment:     Specimen is moderately Hemolyzed. Result may be artificially increased.      ALT   Date Value Ref Range Status   09/08/2022 11 0 - 32 U/L Final     GFR Non-   Date Value Ref Range Status   09/08/2022 >60 >=60 mL/min/1.73 Final     Comment:     Chronic Kidney Disease: less than 60 ml/min/1.73 sq.m. Kidney Failure: less than 15 ml/min/1.73 sq.m. Results valid for patients 18 years and older. GFR    Date Value Ref Range Status   09/08/2022 >60  Final        No results found. Assessment/Plan:    Episodes of altered mental awareness. Likely seizure  Increase Keppra to 500 mg tablet twice daily  Monitor number of events in the next 6 weeks. Proceed with cardiac work-up  Essential hypertension controlled    Outpatient Encounter Medications as of 1/11/2023   Medication Sig Dispense Refill    levETIRAcetam (KEPPRA) 500 MG tablet Take 1 tablet by mouth 2 times daily 180 tablet 0    lisinopril (PRINIVIL;ZESTRIL) 20 MG tablet Take 1 tablet by mouth daily 90 tablet 0    simvastatin (ZOCOR) 20 MG tablet Take 1 tablet by mouth nightly 90 tablet 0    amLODIPine (NORVASC) 5 MG tablet Take 1.5 tablets by mouth daily 90 tablet 0    apixaban (ELIQUIS) 5 MG TABS tablet Take 1 tablet by mouth 2 times daily 180 tablet 0    Calcium Carbonate (CALCIUM 600 PO) Take 1 capsule by mouth daily      vitamin D 25 MCG (1000 UT) CAPS Take 1 capsule by mouth daily      aspirin 81 MG EC tablet Take 81 mg by mouth daily      BIOTIN PO Take 1,000 mcg by mouth daily       LUTEIN PO Take by mouth daily      [DISCONTINUED] levETIRAcetam (KEPPRA) 250 MG tablet Take 1 tablet by mouth 2 times daily 60 tablet 0    [DISCONTINUED] lisinopril (PRINIVIL;ZESTRIL) 20 MG tablet Take 1 tablet by mouth daily 90 tablet 0     No facility-administered encounter medications on file as of 1/11/2023. Sudhakar Hernandez was seen today for follow-up. Diagnoses and all orders for this visit:    Transient alteration of awareness  -     levETIRAcetam (KEPPRA) 500 MG tablet; Take 1 tablet by mouth 2 times daily    Hypertension, essential  -     lisinopril (PRINIVIL;ZESTRIL) 20 MG tablet; Take 1 tablet by mouth daily         There are no Patient Instructions on file for this visit.      On this date 1/11/2023 I have spent 25 minutes reviewing previous notes, test results and face to face with the patient discussing the diagnosis and importance of compliance with the treatment plan as well as documenting on the day of the visit.       Fatoumata Ramos MD   1/11/23

## 2023-02-20 ENCOUNTER — OFFICE VISIT (OUTPATIENT)
Dept: PRIMARY CARE CLINIC | Age: 83
End: 2023-02-20

## 2023-02-20 VITALS
HEIGHT: 61 IN | OXYGEN SATURATION: 92 % | DIASTOLIC BLOOD PRESSURE: 58 MMHG | HEART RATE: 77 BPM | BODY MASS INDEX: 37.37 KG/M2 | SYSTOLIC BLOOD PRESSURE: 108 MMHG | WEIGHT: 197.9 LBS | TEMPERATURE: 97.6 F

## 2023-02-20 DIAGNOSIS — I48.0 PAROXYSMAL ATRIAL FIBRILLATION (HCC): ICD-10-CM

## 2023-02-20 DIAGNOSIS — R40.4 TRANSIENT ALTERATION OF AWARENESS: Primary | ICD-10-CM

## 2023-02-20 DIAGNOSIS — E11.9 TYPE 2 DIABETES MELLITUS WITHOUT COMPLICATION, WITHOUT LONG-TERM CURRENT USE OF INSULIN (HCC): ICD-10-CM

## 2023-02-20 DIAGNOSIS — I10 HYPERTENSION, ESSENTIAL: ICD-10-CM

## 2023-02-20 LAB — HBA1C MFR BLD: 6.4 %

## 2023-02-20 SDOH — ECONOMIC STABILITY: HOUSING INSECURITY
IN THE LAST 12 MONTHS, WAS THERE A TIME WHEN YOU DID NOT HAVE A STEADY PLACE TO SLEEP OR SLEPT IN A SHELTER (INCLUDING NOW)?: NO

## 2023-02-20 SDOH — ECONOMIC STABILITY: FOOD INSECURITY: WITHIN THE PAST 12 MONTHS, YOU WORRIED THAT YOUR FOOD WOULD RUN OUT BEFORE YOU GOT MONEY TO BUY MORE.: NEVER TRUE

## 2023-02-20 SDOH — ECONOMIC STABILITY: FOOD INSECURITY: WITHIN THE PAST 12 MONTHS, THE FOOD YOU BOUGHT JUST DIDN'T LAST AND YOU DIDN'T HAVE MONEY TO GET MORE.: NEVER TRUE

## 2023-02-20 SDOH — ECONOMIC STABILITY: INCOME INSECURITY: HOW HARD IS IT FOR YOU TO PAY FOR THE VERY BASICS LIKE FOOD, HOUSING, MEDICAL CARE, AND HEATING?: NOT VERY HARD

## 2023-02-20 NOTE — PROGRESS NOTES
Chief Complaint   Patient presents with    Diabetes     A1C    Follow-up     Lack of awareness       HPI:  Patient is here for follow-up   Patient is compliant to medications feels generally well except on February 5 she had an episode while she is getting communion and discharge she lost awareness for few seconds while walking in [facility]. Patient stated that she had cardiac work-up was informed by her cardiologist that these episodes are not related to her atrial fibrillation. Patient is currently on Keppra 500 mg twice daily    Hemoglobin A1c 6.4    Past Medical History, Surgical History, and Family History has been reviewed and updated.     Review of Systems:  Constitutional:  No fever, no fatigue, no chills, no headaches, no weight change  Dermatology:  No rash, no mole, no dry or sensitive skin  ENT:  No cough, no sore throat, no sinus pain, no runny nose, no ear pain  Cardiology:  No chest pain, no palpitations, no leg edema, no shortness of breath, no PND  Gastroenterology:  No dysphagia, no abdominal pain, no nausea, no vomiting, no constipation, no diarrhea, no heartburn  Musculoskeletal:  No joint pain, no leg cramps, no back pain, no muscle aches  Respiratory:  No shortness of breath, no orthopnea, no wheezing, no ASENCIO, no hemoptysis  Urology:  No blood in the urine, no urinary frequency, no urinary incontinence, no urinary urgency, no nocturia, no dysuria    Vitals:    02/20/23 1304   BP: (!) 108/58   Site: Left Upper Arm   Position: Sitting   Cuff Size: Medium Adult   Pulse: 77   Temp: 97.6 °F (36.4 °C)   TempSrc: Temporal   SpO2: 92%   Weight: 197 lb 14.4 oz (89.8 kg)   Height: 5' 1\" (1.549 m)       General:  Patient alert and oriented x 3, NAD, pleasant  HEENT:  Atraumatic, normocephalic, PERRLA, EOMI, clear conjunctiva, TMs clear, nose-clear, throat - no erythema  Neck:  Supple, no goiter, no carotid bruits, no LAD  Lungs:  CTA   Heart:  RRR, no murmurs, gallops or rubs  Abdomen:  Soft/nt/nd, + bowel sounds  Lymph node examination: unremarkable  Neurological exam : unremarkable  Extremities:  No clubbing, cyanosis or edema  Skin: unremarkable    Hemoglobin A1C   Date Value Ref Range Status   02/20/2023 6.4 % Final     Cholesterol, Total   Date Value Ref Range Status   12/07/2019 167 0 - 199 mg/dL Final     Triglycerides   Date Value Ref Range Status   12/07/2019 65 0 - 149 mg/dL Final     HDL   Date Value Ref Range Status   12/07/2019 82 >40 mg/dL Final     LDL Calculated   Date Value Ref Range Status   12/07/2019 72 0 - 99 mg/dL Final     VLDL Cholesterol Calculated   Date Value Ref Range Status   12/07/2019 13 mg/dL Final     Sodium   Date Value Ref Range Status   09/08/2022 142 132 - 146 mmol/L Final     Potassium   Date Value Ref Range Status   09/08/2022 4.4 3.5 - 5.0 mmol/L Final     Comment:     Specimen is moderately Hemolyzed. Result may be artificially increased. Chloride   Date Value Ref Range Status   09/08/2022 106 98 - 107 mmol/L Final     CO2   Date Value Ref Range Status   09/08/2022 25 22 - 29 mmol/L Final     BUN   Date Value Ref Range Status   09/08/2022 15 6 - 23 mg/dL Final     Creatinine   Date Value Ref Range Status   09/08/2022 0.6 0.5 - 1.0 mg/dL Final     Glucose   Date Value Ref Range Status   11/07/2022 145 mg/dL Final     Calcium   Date Value Ref Range Status   09/08/2022 9.1 8.6 - 10.2 mg/dL Final     Total Protein   Date Value Ref Range Status   09/08/2022 7.1 6.4 - 8.3 g/dL Final     Albumin   Date Value Ref Range Status   09/08/2022 3.7 3.5 - 5.2 g/dL Final     Total Bilirubin   Date Value Ref Range Status   09/08/2022 0.3 0.0 - 1.2 mg/dL Final     Alkaline Phosphatase   Date Value Ref Range Status   09/08/2022 82 35 - 104 U/L Final     AST   Date Value Ref Range Status   09/08/2022 24 0 - 31 U/L Final     Comment:     Specimen is moderately Hemolyzed. Result may be artificially increased.      ALT   Date Value Ref Range Status   09/08/2022 11 0 - 32 U/L Final     GFR Non-   Date Value Ref Range Status   09/08/2022 >60 >=60 mL/min/1.73 Final     Comment:     Chronic Kidney Disease: less than 60 ml/min/1.73 sq.m. Kidney Failure: less than 15 ml/min/1.73 sq.m. Results valid for patients 18 years and older. GFR    Date Value Ref Range Status   09/08/2022 >60  Final        No results found. 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 Check Keppra drug level  Diabetes mellitus type 2 controlled  Essential hypertension controlled    Outpatient Encounter Medications as of 2/20/2023   Medication Sig Dispense Refill    levETIRAcetam (KEPPRA) 500 MG tablet Take 1 tablet by mouth 2 times daily 180 tablet 0    lisinopril (PRINIVIL;ZESTRIL) 20 MG tablet Take 1 tablet by mouth daily 90 tablet 0    simvastatin (ZOCOR) 20 MG tablet Take 1 tablet by mouth nightly 90 tablet 0    amLODIPine (NORVASC) 5 MG tablet Take 1.5 tablets by mouth daily 90 tablet 0    apixaban (ELIQUIS) 5 MG TABS tablet Take 1 tablet by mouth 2 times daily 180 tablet 0    Calcium Carbonate (CALCIUM 600 PO) Take 1 capsule by mouth daily      vitamin D 25 MCG (1000 UT) CAPS Take 1 capsule by mouth daily      aspirin 81 MG EC tablet Take 81 mg by mouth daily      BIOTIN PO Take 1,000 mcg by mouth daily       LUTEIN PO Take by mouth daily       No facility-administered encounter medications on file as of 2/20/2023. Zaire Sterling was seen today for diabetes and follow-up. Diagnoses and all orders for this visit:    Transient alteration of awareness  -     Levetiracetam Level; Future  -     External Referral To Neurology    Type 2 diabetes mellitus without complication, without long-term current use of insulin (HCC)  -     POCT glycosylated hemoglobin (Hb A1C)    Hypertension, essential    Paroxysmal atrial fibrillation (HCC)         There are no Patient Instructions on file for this visit.      On this date 2/20/2023 I have spent 25 minutes reviewing previous notes, test results and face to face with the patient discussing the diagnosis and importance of compliance with the treatment plan as well as documenting on the day of the visit.       Laxmi Mathias MD   2/20/23

## 2023-02-28 ENCOUNTER — OFFICE VISIT (OUTPATIENT)
Dept: NEUROLOGY | Age: 83
End: 2023-02-28
Payer: MEDICARE

## 2023-02-28 VITALS
SYSTOLIC BLOOD PRESSURE: 153 MMHG | HEART RATE: 74 BPM | WEIGHT: 196 LBS | DIASTOLIC BLOOD PRESSURE: 83 MMHG | OXYGEN SATURATION: 98 % | HEIGHT: 61 IN | BODY MASS INDEX: 37 KG/M2

## 2023-02-28 DIAGNOSIS — R55 PRE-SYNCOPE: ICD-10-CM

## 2023-02-28 DIAGNOSIS — G45.9 TIA (TRANSIENT ISCHEMIC ATTACK): ICD-10-CM

## 2023-02-28 DIAGNOSIS — R41.0 TRANSIENT CONFUSION: ICD-10-CM

## 2023-02-28 DIAGNOSIS — I48.0 PAROXYSMAL ATRIAL FIBRILLATION (HCC): ICD-10-CM

## 2023-02-28 DIAGNOSIS — R40.4 TRANSIENT ALTERATION OF AWARENESS: Primary | ICD-10-CM

## 2023-02-28 PROCEDURE — 1123F ACP DISCUSS/DSCN MKR DOCD: CPT | Performed by: NURSE PRACTITIONER

## 2023-02-28 PROCEDURE — 3079F DIAST BP 80-89 MM HG: CPT | Performed by: NURSE PRACTITIONER

## 2023-02-28 PROCEDURE — 3077F SYST BP >= 140 MM HG: CPT | Performed by: NURSE PRACTITIONER

## 2023-02-28 PROCEDURE — 99215 OFFICE O/P EST HI 40 MIN: CPT | Performed by: NURSE PRACTITIONER

## 2023-02-28 RX ORDER — SAW/PYGEUM/BETA/HERB/D3/B6/ZN 30 MG-25MG
CAPSULE ORAL
COMMUNITY
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
## 2023-03-10 ENCOUNTER — PATIENT MESSAGE (OUTPATIENT)
Dept: NEUROLOGY | Age: 83
End: 2023-03-10

## 2023-03-24 DIAGNOSIS — E78.2 HYPERLIPIDEMIA, MIXED: ICD-10-CM

## 2023-03-24 DIAGNOSIS — I10 HYPERTENSION, ESSENTIAL: ICD-10-CM

## 2023-03-24 NOTE — TELEPHONE ENCOUNTER
Last Appointment:  2/20/2023  Future Appointments   Date Time Provider Severo Rebecca   4/13/2023  1:00 PM Devendra Finch MD [facility]   5/10/2023 11:00 AM JANETH Mabry NP [facility] Neuro Neurology -

## 2023-03-26 RX ORDER — SIMVASTATIN 20 MG
20 TABLET ORAL NIGHTLY
Qty: 90 TABLET | Refills: 0 | Status: SHIPPED | OUTPATIENT
Start: 2023-03-26

## 2023-03-26 RX ORDER — LISINOPRIL 20 MG/1
20 TABLET ORAL DAILY
Qty: 90 TABLET | Refills: 0 | Status: SHIPPED | OUTPATIENT
Start: 2023-03-26

## 2023-03-26 RX ORDER — AMLODIPINE BESYLATE 5 MG/1
7.5 TABLET ORAL DAILY
Qty: 90 TABLET | Refills: 0 | Status: SHIPPED | OUTPATIENT
Start: 2023-03-26

## 2023-04-11 ENCOUNTER — TELEPHONE (OUTPATIENT)
Dept: NEUROLOGY | Age: 83
End: 2023-04-11

## 2023-04-13 PROBLEM — G40.89 OTHER SEIZURES (HCC): Status: ACTIVE | Noted: 2023-04-13

## 2023-04-13 PROBLEM — E66.01 SEVERE OBESITY (BMI 35.0-39.9) WITH COMORBIDITY (HCC): Status: ACTIVE | Noted: 2023-04-13

## 2023-04-18 DIAGNOSIS — R40.4 TRANSIENT ALTERATION OF AWARENESS: ICD-10-CM
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
## 2023-04-20 NOTE — TELEPHONE ENCOUNTER
Spoke with the patient and she understands that she should not be driving yet. She states that she is taking the Keppra as directed and Dr. Dinesh Walsh discussed possibly increasing the medication. I asked her to let us know if he does. She says she will be [facility] for an appointment soon as well.

## 2023-06-06 LAB — DIABETIC RETINOPATHY: NEGATIVE

## 2023-06-23 DIAGNOSIS — I10 HYPERTENSION, ESSENTIAL: ICD-10-CM

## 2023-06-23 DIAGNOSIS — E78.2 HYPERLIPIDEMIA, MIXED: ICD-10-CM

## 2023-06-26 DIAGNOSIS — I10 HYPERTENSION, ESSENTIAL: ICD-10-CM

## 2023-06-26 DIAGNOSIS — E78.2 HYPERLIPIDEMIA, MIXED: ICD-10-CM

## 2023-06-30 DIAGNOSIS — E78.2 HYPERLIPIDEMIA, MIXED: ICD-10-CM

## 2023-06-30 DIAGNOSIS — I10 HYPERTENSION, ESSENTIAL: ICD-10-CM

## 2023-07-03 RX ORDER — LISINOPRIL 20 MG/1
20 TABLET ORAL DAILY
Qty: 90 TABLET | Refills: 0 | OUTPATIENT
Start: 2023-07-03

## 2023-07-03 RX ORDER — LISINOPRIL 20 MG/1
20 TABLET ORAL DAILY
Qty: 90 TABLET | Refills: 0 | Status: SHIPPED | OUTPATIENT
Start: 2023-07-03

## 2023-07-03 RX ORDER — AMLODIPINE BESYLATE 5 MG/1
7.5 TABLET ORAL DAILY
Qty: 90 TABLET | Refills: 0 | OUTPATIENT
Start: 2023-07-03

## 2023-07-03 RX ORDER — SIMVASTATIN 20 MG
20 TABLET ORAL NIGHTLY
Qty: 90 TABLET | Refills: 0 | Status: SHIPPED | OUTPATIENT
Start: 2023-07-03

## 2023-07-03 RX ORDER — SIMVASTATIN 20 MG
20 TABLET ORAL NIGHTLY
Qty: 90 TABLET | Refills: 0 | OUTPATIENT
Start: 2023-07-03

## 2023-07-05 RX ORDER — SIMVASTATIN 20 MG
20 TABLET ORAL NIGHTLY
Qty: 90 TABLET | Refills: 0 | OUTPATIENT
Start: 2023-07-05

## 2023-07-05 RX ORDER — LISINOPRIL 20 MG/1
TABLET ORAL
Qty: 90 TABLET | Refills: 0 | OUTPATIENT
Start: 2023-07-05

## 2023-07-05 RX ORDER — AMLODIPINE BESYLATE 5 MG/1
7.5 TABLET ORAL DAILY
Qty: 90 TABLET | Refills: 0 | OUTPATIENT
Start: 2023-07-05

## 2023-07-12 DIAGNOSIS — I10 HYPERTENSION, ESSENTIAL: ICD-10-CM

## 2023-07-13 RX ORDER — AMLODIPINE BESYLATE 5 MG/1
7.5 TABLET ORAL DAILY
Qty: 90 TABLET | Refills: 0 | Status: SHIPPED | OUTPATIENT
Start: 2023-07-13

## 2023-08-29 ENCOUNTER — OFFICE VISIT (OUTPATIENT)
Dept: PRIMARY CARE CLINIC | Age: 83
End: 2023-08-29
Payer: MEDICARE

## 2023-08-29 VITALS
OXYGEN SATURATION: 95 % | WEIGHT: 202.6 LBS | HEIGHT: 61 IN | SYSTOLIC BLOOD PRESSURE: 138 MMHG | BODY MASS INDEX: 38.25 KG/M2 | HEART RATE: 74 BPM | DIASTOLIC BLOOD PRESSURE: 76 MMHG | TEMPERATURE: 97.1 F

## 2023-08-29 DIAGNOSIS — G47.33 OSA (OBSTRUCTIVE SLEEP APNEA): ICD-10-CM

## 2023-08-29 DIAGNOSIS — E11.9 TYPE 2 DIABETES MELLITUS WITHOUT COMPLICATION, WITHOUT LONG-TERM CURRENT USE OF INSULIN (HCC): ICD-10-CM

## 2023-08-29 DIAGNOSIS — I10 HYPERTENSION, ESSENTIAL: Primary | ICD-10-CM

## 2023-08-29 PROCEDURE — 3078F DIAST BP <80 MM HG: CPT | Performed by: INTERNAL MEDICINE

## 2023-08-29 PROCEDURE — 3044F HG A1C LEVEL LT 7.0%: CPT | Performed by: INTERNAL MEDICINE

## 2023-08-29 PROCEDURE — 99213 OFFICE O/P EST LOW 20 MIN: CPT | Performed by: INTERNAL MEDICINE

## 2023-08-29 PROCEDURE — 3075F SYST BP GE 130 - 139MM HG: CPT | Performed by: INTERNAL MEDICINE

## 2023-08-29 PROCEDURE — 1123F ACP DISCUSS/DSCN MKR DOCD: CPT | Performed by: INTERNAL MEDICINE

## 2023-08-29 NOTE — PROGRESS NOTES
Comment:     Chronic Kidney Disease: less than 60 ml/min/1.73 sq.m. Kidney Failure: less than 15 ml/min/1.73 sq.m. Results valid for patients 18 years and older. GFR    Date Value Ref Range Status   09/08/2022 >60  Final        No results found. Assessment/Plan:      Outpatient Encounter Medications as of 8/29/2023   Medication Sig Dispense Refill    amLODIPine (NORVASC) 5 MG tablet Take 1.5 tablets by mouth daily 90 tablet 0    simvastatin (ZOCOR) 20 MG tablet Take 1 tablet by mouth nightly 90 tablet 0    lisinopril (PRINIVIL;ZESTRIL) 20 MG tablet Take 1 tablet by mouth daily 90 tablet 0    Melatonin ER 10 MG TBCR Take by mouth      apixaban (ELIQUIS) 5 MG TABS tablet Take 1 tablet by mouth 2 times daily 180 tablet 0    Calcium Carbonate (CALCIUM 600 PO) Take 1 capsule by mouth daily      vitamin D 25 MCG (1000 UT) CAPS Take 1 capsule by mouth daily      aspirin 81 MG EC tablet Take 1 tablet by mouth daily      BIOTIN PO Take 1,000 mcg by mouth daily       LUTEIN PO Take by mouth daily      [DISCONTINUED] levETIRAcetam (KEPPRA) 500 MG tablet Take 1 tablet by mouth 2 times daily 180 tablet 0     No facility-administered encounter medications on file as of 8/29/2023. Sagrario Grider was seen today for follow-up. Diagnoses and all orders for this visit:    Hypertension, essential  -     Comprehensive Metabolic Panel; Future  -     CBC with Auto Differential; Future  -     Urinalysis; Future    Type 2 diabetes mellitus without complication, without long-term current use of insulin ([address])  -     LIPID PANEL; Future    DARVIN (obstructive sleep apnea)         There are no Patient Instructions on file for this visit. On this date 8/29/2023 I have spent 25 minutes reviewing previous notes, test results and face to face with the patient discussing the diagnosis and importance of compliance with the treatment plan as well as documenting on the day of the visit.       Rony Shi MD

## 2023-09-13 DIAGNOSIS — I10 HYPERTENSION, ESSENTIAL: ICD-10-CM

## 2023-09-13 DIAGNOSIS — E78.2 HYPERLIPIDEMIA, MIXED: ICD-10-CM

## 2023-09-14 RX ORDER — LISINOPRIL 20 MG/1
20 TABLET ORAL DAILY
Qty: 90 TABLET | Refills: 0 | Status: SHIPPED | OUTPATIENT
Start: 2023-09-14

## 2023-09-14 RX ORDER — SIMVASTATIN 20 MG
20 TABLET ORAL NIGHTLY
Qty: 90 TABLET | Refills: 0 | Status: SHIPPED | OUTPATIENT
Start: 2023-09-14

## 2023-11-17 DIAGNOSIS — I10 HYPERTENSION, ESSENTIAL: ICD-10-CM

## 2023-11-17 RX ORDER — AMLODIPINE BESYLATE 5 MG/1
7.5 TABLET ORAL DAILY
Qty: 90 TABLET | Refills: 0 | Status: SHIPPED | OUTPATIENT
Start: 2023-11-17

## 2023-12-11 DIAGNOSIS — I10 HYPERTENSION, ESSENTIAL: ICD-10-CM

## 2023-12-12 DIAGNOSIS — E78.2 HYPERLIPIDEMIA, MIXED: ICD-10-CM

## 2023-12-12 RX ORDER — LISINOPRIL 20 MG/1
20 TABLET ORAL DAILY
Qty: 90 TABLET | Refills: 0 | Status: SHIPPED | OUTPATIENT
Start: 2023-12-12

## 2023-12-12 RX ORDER — SIMVASTATIN 20 MG
20 TABLET ORAL NIGHTLY
Qty: 90 TABLET | Refills: 0 | Status: SHIPPED | OUTPATIENT
Start: 2023-12-12

## 2024-01-08 LAB
ALBUMIN SERPL-MCNC: NORMAL G/DL
ALBUMIN SERPL-MCNC: NORMAL G/DL
ALP BLD-CCNC: NORMAL U/L
ALP BLD-CCNC: NORMAL U/L
ALT SERPL-CCNC: NORMAL U/L
ALT SERPL-CCNC: NORMAL U/L
ANION GAP SERPL CALCULATED.3IONS-SCNC: NORMAL MMOL/L
ANION GAP SERPL CALCULATED.3IONS-SCNC: NORMAL MMOL/L
AST SERPL-CCNC: NORMAL U/L
AST SERPL-CCNC: NORMAL U/L
BASOPHILS ABSOLUTE: NORMAL
BASOPHILS RELATIVE PERCENT: NORMAL
BILIRUB SERPL-MCNC: NORMAL MG/DL
BILIRUB SERPL-MCNC: NORMAL MG/DL
BILIRUBIN, URINE: NORMAL
BLOOD, URINE: NORMAL
BUN BLDV-MCNC: NORMAL MG/DL
BUN BLDV-MCNC: NORMAL MG/DL
CALCIUM SERPL-MCNC: NORMAL MG/DL
CALCIUM SERPL-MCNC: NORMAL MG/DL
CHLORIDE BLD-SCNC: NORMAL MMOL/L
CHLORIDE BLD-SCNC: NORMAL MMOL/L
CHOLESTEROL, TOTAL: NORMAL
CHOLESTEROL/HDL RATIO: NORMAL
CLARITY: NORMAL
CO2: NORMAL
CO2: NORMAL
COLOR: NORMAL
CREAT SERPL-MCNC: NORMAL MG/DL
CREAT SERPL-MCNC: NORMAL MG/DL
EGFR: NORMAL
EGFR: NORMAL
EOSINOPHILS ABSOLUTE: NORMAL
EOSINOPHILS RELATIVE PERCENT: NORMAL
GLUCOSE BLD-MCNC: NORMAL MG/DL
GLUCOSE BLD-MCNC: NORMAL MG/DL
GLUCOSE URINE: NORMAL
HCT VFR BLD CALC: NORMAL %
HDLC SERPL-MCNC: NORMAL MG/DL
HEMOGLOBIN: NORMAL
KETONES, URINE: NORMAL
LDL CHOLESTEROL CALCULATED: NORMAL
LEUKOCYTE ESTERASE, URINE: NORMAL
LYMPHOCYTES ABSOLUTE: NORMAL
LYMPHOCYTES RELATIVE PERCENT: NORMAL
MCH RBC QN AUTO: NORMAL PG
MCHC RBC AUTO-ENTMCNC: NORMAL G/DL
MCV RBC AUTO: NORMAL FL
MONOCYTES ABSOLUTE: NORMAL
MONOCYTES RELATIVE PERCENT: NORMAL
NEUTROPHILS ABSOLUTE: NORMAL
NEUTROPHILS RELATIVE PERCENT: NORMAL
NITRITE, URINE: NORMAL
NONHDLC SERPL-MCNC: NORMAL MG/DL
PDW BLD-RTO: NORMAL %
PH UA: NORMAL
PLATELET # BLD: NORMAL 10*3/UL
PMV BLD AUTO: NORMAL FL
POTASSIUM SERPL-SCNC: NORMAL MMOL/L
POTASSIUM SERPL-SCNC: NORMAL MMOL/L
PROTEIN UA: NORMAL
RBC # BLD: NORMAL 10*6/UL
SODIUM BLD-SCNC: NORMAL MMOL/L
SODIUM BLD-SCNC: NORMAL MMOL/L
SPECIFIC GRAVITY, URINE: NORMAL
TOTAL PROTEIN: NORMAL
TOTAL PROTEIN: NORMAL
TRIGL SERPL-MCNC: NORMAL MG/DL
UROBILINOGEN, URINE: NORMAL
VLDLC SERPL CALC-MCNC: NORMAL MG/DL
WBC # BLD: NORMAL 10*3/UL

## 2024-01-09 DIAGNOSIS — E11.9 TYPE 2 DIABETES MELLITUS WITHOUT COMPLICATION, WITHOUT LONG-TERM CURRENT USE OF INSULIN (HCC): ICD-10-CM

## 2024-01-09 DIAGNOSIS — I10 HYPERTENSION, ESSENTIAL: ICD-10-CM

## 2024-01-11 ENCOUNTER — OFFICE VISIT (OUTPATIENT)
Dept: PRIMARY CARE CLINIC | Age: 84
End: 2024-01-11
Payer: MEDICARE

## 2024-01-11 VITALS
HEART RATE: 93 BPM | SYSTOLIC BLOOD PRESSURE: 132 MMHG | OXYGEN SATURATION: 98 % | BODY MASS INDEX: 39.2 KG/M2 | TEMPERATURE: 98.1 F | WEIGHT: 207.6 LBS | HEIGHT: 61 IN | DIASTOLIC BLOOD PRESSURE: 82 MMHG

## 2024-01-11 DIAGNOSIS — Z89.512 HX OF BELOW KNEE AMPUTATION, LEFT (HCC): ICD-10-CM

## 2024-01-11 DIAGNOSIS — I48.0 PAROXYSMAL ATRIAL FIBRILLATION (HCC): ICD-10-CM

## 2024-01-11 DIAGNOSIS — G47.33 OSA (OBSTRUCTIVE SLEEP APNEA): ICD-10-CM

## 2024-01-11 DIAGNOSIS — I10 HYPERTENSION, ESSENTIAL: ICD-10-CM

## 2024-01-11 DIAGNOSIS — E66.01 SEVERE OBESITY (BMI 35.0-39.9) WITH COMORBIDITY (HCC): ICD-10-CM

## 2024-01-11 DIAGNOSIS — F51.01 PRIMARY INSOMNIA: ICD-10-CM

## 2024-01-11 DIAGNOSIS — E11.9 TYPE 2 DIABETES MELLITUS WITHOUT COMPLICATION, WITHOUT LONG-TERM CURRENT USE OF INSULIN (HCC): Primary | ICD-10-CM

## 2024-01-11 DIAGNOSIS — I35.0 MODERATE AORTIC STENOSIS: ICD-10-CM

## 2024-01-11 PROBLEM — G40.89 OTHER SEIZURES (HCC): Status: RESOLVED | Noted: 2023-04-13 | Resolved: 2024-01-11

## 2024-01-11 LAB — HBA1C MFR BLD: 7.7 %

## 2024-01-11 PROCEDURE — 99214 OFFICE O/P EST MOD 30 MIN: CPT | Performed by: INTERNAL MEDICINE

## 2024-01-11 PROCEDURE — 3051F HG A1C>EQUAL 7.0%<8.0%: CPT | Performed by: INTERNAL MEDICINE

## 2024-01-11 PROCEDURE — 3075F SYST BP GE 130 - 139MM HG: CPT | Performed by: INTERNAL MEDICINE

## 2024-01-11 PROCEDURE — 1123F ACP DISCUSS/DSCN MKR DOCD: CPT | Performed by: INTERNAL MEDICINE

## 2024-01-11 PROCEDURE — 3079F DIAST BP 80-89 MM HG: CPT | Performed by: INTERNAL MEDICINE

## 2024-01-11 PROCEDURE — 83036 HEMOGLOBIN GLYCOSYLATED A1C: CPT | Performed by: INTERNAL MEDICINE

## 2024-01-11 RX ORDER — METFORMIN HYDROCHLORIDE 500 MG/1
1000 TABLET, EXTENDED RELEASE ORAL
Qty: 180 TABLET | Refills: 0 | Status: SHIPPED | OUTPATIENT
Start: 2024-01-11

## 2024-01-11 RX ORDER — DOXEPIN HYDROCHLORIDE 10 MG/1
10 CAPSULE ORAL NIGHTLY
Qty: 30 CAPSULE | Refills: 0 | Status: SHIPPED | OUTPATIENT
Start: 2024-01-11

## 2024-01-11 ASSESSMENT — PATIENT HEALTH QUESTIONNAIRE - PHQ9
2. FEELING DOWN, DEPRESSED OR HOPELESS: 0
SUM OF ALL RESPONSES TO PHQ9 QUESTIONS 1 & 2: 0
SUM OF ALL RESPONSES TO PHQ QUESTIONS 1-9: 0
1. LITTLE INTEREST OR PLEASURE IN DOING THINGS: 0
SUM OF ALL RESPONSES TO PHQ QUESTIONS 1-9: 0

## 2024-01-11 NOTE — PROGRESS NOTES
Final     Total Bilirubin   Date Value Ref Range Status   09/08/2022 0.3 0.0 - 1.2 mg/dL Final     Alkaline Phosphatase   Date Value Ref Range Status   09/08/2022 82 35 - 104 U/L Final     AST   Date Value Ref Range Status   09/08/2022 24 0 - 31 U/L Final     Comment:     Specimen is moderately Hemolyzed. Result may be artificially increased.     ALT   Date Value Ref Range Status   09/08/2022 11 0 - 32 U/L Final     GFR Non-   Date Value Ref Range Status   09/08/2022 >60 >=60 mL/min/1.73 Final     Comment:     Chronic Kidney Disease: less than 60 ml/min/1.73 sq.m.          Kidney Failure: less than 15 ml/min/1.73 sq.m.  Results valid for patients 18 years and older.       GFR    Date Value Ref Range Status   09/08/2022 >60  Final        No results found.     Assessment/Plan:    Diabetes mellitus type 2 uncontrolled.  Start metformin  mg tablet 2 tablets daily after supper  Essential hypertension controlled  Aortic stenosis moderate  Sleep apnea controlled on CPAP mask patient is compliant  Insomnia.  Doxepin 10 mg capsule nightly    Outpatient Encounter Medications as of 1/11/2024   Medication Sig Dispense Refill    metFORMIN (GLUCOPHAGE-XR) 500 MG extended release tablet Take 2 tablets by mouth daily (with breakfast) 180 tablet 0    doxepin (SINEQUAN) 10 MG capsule Take 1 capsule by mouth nightly 30 capsule 0    lisinopril (PRINIVIL;ZESTRIL) 20 MG tablet Take 1 tablet by mouth daily 90 tablet 0    simvastatin (ZOCOR) 20 MG tablet Take 1 tablet by mouth nightly 90 tablet 0    amLODIPine (NORVASC) 5 MG tablet Take 1.5 tablets by mouth daily 90 tablet 0    Melatonin ER 10 MG TBCR Take by mouth      apixaban (ELIQUIS) 5 MG TABS tablet Take 1 tablet by mouth 2 times daily 180 tablet 0    Calcium Carbonate (CALCIUM 600 PO) Take 1 capsule by mouth daily      vitamin D 25 MCG (1000 UT) CAPS Take 1 capsule by mouth daily      aspirin 81 MG EC tablet Take 1 tablet by mouth daily

## 2024-02-06 DIAGNOSIS — F51.01 PRIMARY INSOMNIA: ICD-10-CM

## 2024-02-06 DIAGNOSIS — I10 HYPERTENSION, ESSENTIAL: ICD-10-CM

## 2024-02-06 RX ORDER — DOXEPIN HYDROCHLORIDE 10 MG/1
10 CAPSULE ORAL NIGHTLY
Qty: 90 CAPSULE | Refills: 0 | Status: SHIPPED | OUTPATIENT
Start: 2024-02-06

## 2024-02-06 RX ORDER — AMLODIPINE BESYLATE 5 MG/1
7.5 TABLET ORAL DAILY
Qty: 90 TABLET | Refills: 0 | Status: SHIPPED | OUTPATIENT
Start: 2024-02-06

## 2024-02-26 DIAGNOSIS — I10 HYPERTENSION, ESSENTIAL: ICD-10-CM

## 2024-02-26 RX ORDER — LISINOPRIL 20 MG/1
20 TABLET ORAL DAILY
Qty: 90 TABLET | Refills: 0 | Status: SHIPPED | OUTPATIENT
Start: 2024-02-26

## 2024-03-11 DIAGNOSIS — E78.2 HYPERLIPIDEMIA, MIXED: ICD-10-CM

## 2024-03-11 RX ORDER — SIMVASTATIN 20 MG
20 TABLET ORAL NIGHTLY
Qty: 90 TABLET | Refills: 0 | Status: SHIPPED | OUTPATIENT
Start: 2024-03-11

## 2024-03-12 ENCOUNTER — TELEPHONE (OUTPATIENT)
Dept: PRIMARY CARE CLINIC | Age: 84
End: 2024-03-12

## 2024-03-12 NOTE — TELEPHONE ENCOUNTER
Patient would like to know if Dr Nevarez will give her a script to help with her vertigo    Send to [facility]

## 2024-03-12 NOTE — TELEPHONE ENCOUNTER
Notified patient that she needs evaluated for the dizziness but she declined any further treatment at this time.
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
## 2024-03-24 SDOH — ECONOMIC STABILITY: TRANSPORTATION INSECURITY
IN THE PAST 12 MONTHS, HAS LACK OF TRANSPORTATION KEPT YOU FROM MEETINGS, WORK, OR FROM GETTING THINGS NEEDED FOR DAILY LIVING?: NO

## 2024-03-24 SDOH — ECONOMIC STABILITY: FOOD INSECURITY: WITHIN THE PAST 12 MONTHS, YOU WORRIED THAT YOUR FOOD WOULD RUN OUT BEFORE YOU GOT MONEY TO BUY MORE.: NEVER TRUE

## 2024-03-24 SDOH — ECONOMIC STABILITY: INCOME INSECURITY: HOW HARD IS IT FOR YOU TO PAY FOR THE VERY BASICS LIKE FOOD, HOUSING, MEDICAL CARE, AND HEATING?: SOMEWHAT HARD

## 2024-03-24 SDOH — ECONOMIC STABILITY: FOOD INSECURITY: WITHIN THE PAST 12 MONTHS, THE FOOD YOU BOUGHT JUST DIDN'T LAST AND YOU DIDN'T HAVE MONEY TO GET MORE.: NEVER TRUE

## 2024-03-24 SDOH — HEALTH STABILITY: PHYSICAL HEALTH: ON AVERAGE, HOW MANY DAYS PER WEEK DO YOU ENGAGE IN MODERATE TO STRENUOUS EXERCISE (LIKE A BRISK WALK)?: 1 DAY

## 2024-03-24 ASSESSMENT — LIFESTYLE VARIABLES
HOW OFTEN DO YOU HAVE A DRINK CONTAINING ALCOHOL: 3
HOW OFTEN DO YOU HAVE A DRINK CONTAINING ALCOHOL: 2-4 TIMES A MONTH
HOW MANY STANDARD DRINKS CONTAINING ALCOHOL DO YOU HAVE ON A TYPICAL DAY: 98
HOW MANY STANDARD DRINKS CONTAINING ALCOHOL DO YOU HAVE ON A TYPICAL DAY: PATIENT DECLINED

## 2024-03-24 ASSESSMENT — PATIENT HEALTH QUESTIONNAIRE - PHQ9
SUM OF ALL RESPONSES TO PHQ QUESTIONS 1-9: 0
SUM OF ALL RESPONSES TO PHQ QUESTIONS 1-9: 0
2. FEELING DOWN, DEPRESSED OR HOPELESS: NOT AT ALL
SUM OF ALL RESPONSES TO PHQ9 QUESTIONS 1 & 2: 0
1. LITTLE INTEREST OR PLEASURE IN DOING THINGS: NOT AT ALL
SUM OF ALL RESPONSES TO PHQ QUESTIONS 1-9: 0
SUM OF ALL RESPONSES TO PHQ QUESTIONS 1-9: 0

## 2024-03-25 ENCOUNTER — OFFICE VISIT (OUTPATIENT)
Dept: PRIMARY CARE CLINIC | Age: 84
End: 2024-03-25
Payer: MEDICARE

## 2024-03-25 VITALS
DIASTOLIC BLOOD PRESSURE: 66 MMHG | WEIGHT: 207 LBS | TEMPERATURE: 97.7 F | SYSTOLIC BLOOD PRESSURE: 128 MMHG | HEART RATE: 92 BPM | BODY MASS INDEX: 39.08 KG/M2 | OXYGEN SATURATION: 99 % | HEIGHT: 61 IN

## 2024-03-25 DIAGNOSIS — R42 VERTIGO: ICD-10-CM

## 2024-03-25 DIAGNOSIS — E66.01 SEVERE OBESITY (BMI 35.0-39.9) WITH COMORBIDITY (HCC): ICD-10-CM

## 2024-03-25 DIAGNOSIS — Z00.00 MEDICARE ANNUAL WELLNESS VISIT, SUBSEQUENT: Primary | ICD-10-CM

## 2024-03-25 DIAGNOSIS — E66.01 CLASS 2 SEVERE OBESITY DUE TO EXCESS CALORIES WITH SERIOUS COMORBIDITY AND BODY MASS INDEX (BMI) OF 39.0 TO 39.9 IN ADULT (HCC): ICD-10-CM

## 2024-03-25 DIAGNOSIS — I10 HYPERTENSION, ESSENTIAL: ICD-10-CM

## 2024-03-25 DIAGNOSIS — G47.33 OSA (OBSTRUCTIVE SLEEP APNEA): ICD-10-CM

## 2024-03-25 DIAGNOSIS — E11.9 TYPE 2 DIABETES MELLITUS WITHOUT COMPLICATION, WITHOUT LONG-TERM CURRENT USE OF INSULIN (HCC): ICD-10-CM

## 2024-03-25 PROCEDURE — 3051F HG A1C>EQUAL 7.0%<8.0%: CPT | Performed by: INTERNAL MEDICINE

## 2024-03-25 PROCEDURE — 3074F SYST BP LT 130 MM HG: CPT | Performed by: INTERNAL MEDICINE

## 2024-03-25 PROCEDURE — G0439 PPPS, SUBSEQ VISIT: HCPCS | Performed by: INTERNAL MEDICINE

## 2024-03-25 PROCEDURE — 3078F DIAST BP <80 MM HG: CPT | Performed by: INTERNAL MEDICINE

## 2024-03-25 PROCEDURE — 1123F ACP DISCUSS/DSCN MKR DOCD: CPT | Performed by: INTERNAL MEDICINE

## 2024-03-25 PROCEDURE — 99213 OFFICE O/P EST LOW 20 MIN: CPT | Performed by: INTERNAL MEDICINE

## 2024-03-25 RX ORDER — MECLIZINE HCL 12.5 MG/1
12.5 TABLET ORAL 3 TIMES DAILY PRN
Qty: 30 TABLET | Refills: 0 | Status: SHIPPED | OUTPATIENT
Start: 2024-03-25 | End: 2024-04-04

## 2024-03-25 NOTE — PROGRESS NOTES
right sternal border radiating to the left sternal border and apex grade 3/6,no gallops or rubs  Abdomen:  Soft/nt/nd, + bowel sounds  Lymph node examination: unremarkable  Neurological exam : unremarkable  Extremities:  No clubbing, cyanosis + left BKA stump bruises, no open areas no drainage.  Skin: unremarkable      Assessment and plan:  Diabetes mellitus type 2.  Discontinue metformin start Januvia 100 mg tablet daily  Vertigo improving.  Antivert 12.5 mg tablet 3 times daily as needed  Obstructive sleep apnea patient is compliant on CPAP mask every night no further episodes of blackout spells  Essential hypertension controlled  Morbid obesity      Patient's complete Health Risk Assessment and screening values have been reviewed and are found in Flowsheets. The following problems were reviewed today and where indicated follow up appointments were made and/or referrals ordered.    Positive Risk Factor Screenings with Interventions:                Activity, Diet, and Weight:  On average, how many days per week do you engage in moderate to strenuous exercise (like a brisk walk)?: 1 day       Do you eat balanced/healthy meals regularly?: (!) No    Body mass index is 39.13 kg/m². (!) Abnormal    Do you eat balanced/healthy meals regularly Interventions:  Patient comments: diabetic meal plan  Obesity Interventions:  Patient declines any further evaluation or treatment                ADL's:   Patient reports needing help with:  Select all that apply: (!) Walking/Balance  Interventions:                    Objective   Vitals:    03/25/24 1351   BP: 128/66   Site: Right Upper Arm   Position: Sitting   Cuff Size: Medium Adult   Pulse: 92   Temp: 97.7 °F (36.5 °C)   TempSrc: Temporal   SpO2: 99%   Weight: 93.9 kg (207 lb)   Height: 1.549 m (5' 0.98\")      Body mass index is 39.13 kg/m².             Allergies   Allergen Reactions    Neosporin [Neomycin-Polymyxin-Gramicidin] Rash     Prior to Visit Medications    Medication

## 2024-04-22 ENCOUNTER — TELEPHONE (OUTPATIENT)
Dept: PRIMARY CARE CLINIC | Age: 84
End: 2024-04-22

## 2024-04-23 DIAGNOSIS — B37.31 YEAST VAGINITIS: Primary | ICD-10-CM

## 2024-04-23 RX ORDER — FLUCONAZOLE 150 MG/1
150 TABLET ORAL ONCE
Qty: 1 TABLET | Refills: 0 | Status: SHIPPED | OUTPATIENT
Start: 2024-04-23 | End: 2024-04-23

## 2024-04-23 NOTE — TELEPHONE ENCOUNTER
Called patient and told her that Dr. Nevarez called her in some medication but she needs to hold her cholesterol medication for 2 days and then resume.  Patient voiced her understanding.

## 2024-04-28 DIAGNOSIS — F51.01 PRIMARY INSOMNIA: ICD-10-CM

## 2024-04-29 ENCOUNTER — TELEPHONE (OUTPATIENT)
Dept: PRIMARY CARE CLINIC | Age: 84
End: 2024-04-29

## 2024-04-29 RX ORDER — DOXEPIN HYDROCHLORIDE 10 MG/1
10 CAPSULE ORAL NIGHTLY
Qty: 90 CAPSULE | Refills: 0 | Status: SHIPPED | OUTPATIENT
Start: 2024-04-29

## 2024-04-29 NOTE — TELEPHONE ENCOUNTER
Patient would like something to help her fall asleep and stay asleep. She said the doxepin is not helping    Bobby Kim

## 2024-05-06 ENCOUNTER — TELEPHONE (OUTPATIENT)
Dept: PRIMARY CARE CLINIC | Age: 84
End: 2024-05-06

## 2024-05-06 NOTE — TELEPHONE ENCOUNTER
Patient requesting another script for medication for an yeast infection, patient states it did not go away.

## 2024-05-10 ENCOUNTER — TELEPHONE (OUTPATIENT)
Dept: PRIMARY CARE CLINIC | Age: 84
End: 2024-05-10

## 2024-05-19 DIAGNOSIS — I10 HYPERTENSION, ESSENTIAL: ICD-10-CM

## 2024-05-21 RX ORDER — LISINOPRIL 20 MG/1
20 TABLET ORAL DAILY
Qty: 90 TABLET | Refills: 0 | Status: SHIPPED | OUTPATIENT
Start: 2024-05-21

## 2024-05-21 RX ORDER — AMLODIPINE BESYLATE 5 MG/1
7.5 TABLET ORAL DAILY
Qty: 90 TABLET | Refills: 0 | Status: SHIPPED | OUTPATIENT
Start: 2024-05-21

## 2024-06-11 RX ORDER — SIMVASTATIN 20 MG
20 TABLET ORAL NIGHTLY
Qty: 90 TABLET | Refills: 0 | Status: CANCELLED | OUTPATIENT
Start: 2024-06-11 | End: 2024-09-09

## 2024-06-11 RX ORDER — SIMVASTATIN 20 MG
20 TABLET ORAL NIGHTLY
COMMUNITY

## 2024-06-19 RX ORDER — SIMVASTATIN 20 MG
20 TABLET ORAL NIGHTLY
Qty: 90 TABLET | Refills: 0 | Status: SHIPPED | OUTPATIENT
Start: 2024-06-19 | End: 2024-09-17

## 2024-06-27 ENCOUNTER — OFFICE VISIT (OUTPATIENT)
Dept: PRIMARY CARE CLINIC | Age: 84
End: 2024-06-27
Payer: MEDICARE

## 2024-06-27 VITALS
DIASTOLIC BLOOD PRESSURE: 84 MMHG | TEMPERATURE: 97.4 F | SYSTOLIC BLOOD PRESSURE: 128 MMHG | HEART RATE: 79 BPM | OXYGEN SATURATION: 96 % | BODY MASS INDEX: 39.11 KG/M2 | HEIGHT: 61 IN

## 2024-06-27 DIAGNOSIS — G47.33 OSA (OBSTRUCTIVE SLEEP APNEA): ICD-10-CM

## 2024-06-27 DIAGNOSIS — I10 HYPERTENSION, ESSENTIAL: ICD-10-CM

## 2024-06-27 DIAGNOSIS — E11.9 TYPE 2 DIABETES MELLITUS WITHOUT COMPLICATION, WITHOUT LONG-TERM CURRENT USE OF INSULIN (HCC): Primary | ICD-10-CM

## 2024-06-27 DIAGNOSIS — B37.31 GENITAL CANDIDIASIS IN FEMALE: ICD-10-CM

## 2024-06-27 DIAGNOSIS — F51.01 PRIMARY INSOMNIA: ICD-10-CM

## 2024-06-27 LAB — HBA1C MFR BLD: 7.1 %

## 2024-06-27 PROCEDURE — 1123F ACP DISCUSS/DSCN MKR DOCD: CPT | Performed by: INTERNAL MEDICINE

## 2024-06-27 PROCEDURE — 3051F HG A1C>EQUAL 7.0%<8.0%: CPT | Performed by: INTERNAL MEDICINE

## 2024-06-27 PROCEDURE — 99215 OFFICE O/P EST HI 40 MIN: CPT | Performed by: INTERNAL MEDICINE

## 2024-06-27 PROCEDURE — 83036 HEMOGLOBIN GLYCOSYLATED A1C: CPT | Performed by: INTERNAL MEDICINE

## 2024-06-27 PROCEDURE — 3074F SYST BP LT 130 MM HG: CPT | Performed by: INTERNAL MEDICINE

## 2024-06-27 PROCEDURE — 3079F DIAST BP 80-89 MM HG: CPT | Performed by: INTERNAL MEDICINE

## 2024-06-27 RX ORDER — TRAZODONE HYDROCHLORIDE 50 MG/1
50 TABLET ORAL NIGHTLY
Qty: 30 TABLET | Refills: 0 | Status: SHIPPED | OUTPATIENT
Start: 2024-06-27

## 2024-06-27 RX ORDER — VALACYCLOVIR HYDROCHLORIDE 1 G/1
TABLET, FILM COATED ORAL
COMMUNITY
Start: 2024-05-30

## 2024-06-27 RX ORDER — MICONAZOLE NITRATE ANTIFUNGAL POWDER 2 G/100G
POWDER TOPICAL
COMMUNITY
Start: 2024-05-30 | End: 2024-06-27

## 2024-06-27 RX ORDER — KETOCONAZOLE 20 MG/G
CREAM TOPICAL
COMMUNITY
Start: 2024-05-30 | End: 2024-06-27

## 2024-06-27 RX ORDER — CLOTRIMAZOLE AND BETAMETHASONE DIPROPIONATE 10; .64 MG/G; MG/G
CREAM TOPICAL
Qty: 45 G | Refills: 1 | Status: SHIPPED | OUTPATIENT
Start: 2024-06-27

## 2024-06-27 RX ORDER — MICONAZOLE NITRATE 20 MG/G
POWDER TOPICAL
Qty: 100 G | Refills: 1 | Status: SHIPPED | OUTPATIENT
Start: 2024-06-27

## 2024-06-27 NOTE — PROGRESS NOTES
frequency, no urinary incontinence, no urinary urgency, no nocturia, no dysuria    Vitals:    06/27/24 1314   BP: 128/84   Pulse: 79   Temp: 97.4 °F (36.3 °C)   SpO2: 96%   Height: 1.549 m (5' 1\")       General:  Patient alert and oriented x 3, NAD, pleasant  HEENT:  Atraumatic, normocephalic, PERRLA, EOMI, clear conjunctiva, TMs clear, nose-clear, throat - no erythema  Neck:  Supple, no goiter, no carotid bruits, no LAD  Lungs:  CTA   Heart:  RRR, + systolic murmur ,no  gallops or rubs  Abdomen:  Soft/nt/nd, + bowel sounds  Lymph node examination: unremarkable  Neurological exam : unremarkable  Extremities:  No clubbing, cyanosis or edema+ left BKA  Skin: Genital erythema diffuse involving the vulva suprapubic area both groins and perianal area    Hemoglobin A1C   Date Value Ref Range Status   06/27/2024 7.1 % Final     Cholesterol, Total   Date Value Ref Range Status   12/07/2019 167 0 - 199 mg/dL Final     Triglycerides   Date Value Ref Range Status   12/07/2019 65 0 - 149 mg/dL Final     HDL   Date Value Ref Range Status   12/07/2019 82 >40 mg/dL Final     VLDL Cholesterol Calculated   Date Value Ref Range Status   12/07/2019 13 mg/dL Final     Sodium   Date Value Ref Range Status   09/08/2022 142 132 - 146 mmol/L Final     Potassium   Date Value Ref Range Status   09/08/2022 4.4 3.5 - 5.0 mmol/L Final     Comment:     Specimen is moderately Hemolyzed. Result may be artificially increased.     Chloride   Date Value Ref Range Status   09/08/2022 106 98 - 107 mmol/L Final     CO2   Date Value Ref Range Status   09/08/2022 25 22 - 29 mmol/L Final     BUN   Date Value Ref Range Status   09/08/2022 15 6 - 23 mg/dL Final     Creatinine   Date Value Ref Range Status   09/08/2022 0.6 0.5 - 1.0 mg/dL Final     Glucose   Date Value Ref Range Status   11/07/2022 145 mg/dL Final     Calcium   Date Value Ref Range Status   09/08/2022 9.1 8.6 - 10.2 mg/dL Final     Total Bilirubin   Date Value Ref Range Status   09/08/2022

## 2024-07-10 ENCOUNTER — TELEPHONE (OUTPATIENT)
Dept: PRIMARY CARE CLINIC | Age: 84
End: 2024-07-10

## 2024-07-23 DIAGNOSIS — F51.01 PRIMARY INSOMNIA: ICD-10-CM

## 2024-07-23 RX ORDER — TRAZODONE HYDROCHLORIDE 50 MG/1
50 TABLET ORAL NIGHTLY
Qty: 30 TABLET | Refills: 1 | Status: SHIPPED | OUTPATIENT
Start: 2024-07-23

## 2024-07-30 ENCOUNTER — PATIENT MESSAGE (OUTPATIENT)
Dept: PRIMARY CARE CLINIC | Age: 84
End: 2024-07-30

## 2024-07-30 ENCOUNTER — TELEPHONE (OUTPATIENT)
Dept: PRIMARY CARE CLINIC | Age: 84
End: 2024-07-30

## 2024-07-30 NOTE — TELEPHONE ENCOUNTER
Patient wants metformin sent to [facility] because she is in donut hole with insurance and cannot afford it

## 2024-07-31 ENCOUNTER — TRANSCRIBE ORDERS (OUTPATIENT)
Dept: ADMINISTRATIVE | Age: 84
End: 2024-07-31

## 2024-07-31 DIAGNOSIS — I65.23 BILATERAL CAROTID ARTERY STENOSIS: Primary | ICD-10-CM

## 2024-08-01 LAB — DIABETIC RETINOPATHY: NEGATIVE

## 2024-08-06 NOTE — TELEPHONE ENCOUNTER
From: WILL Crockett W  To: Coral Coley  Sent: 7/30/2024 2:55 PM EDT  Subject: Messages    Maggie Moncada,    You can use this link to send messages to myself or Amarilis. It is sometimes faster than calling on the phone. We get these messages and then forward them to the physician. Due to the amount of messages and the schedule of the doctor, it may still take a couple days for the doctor to respond. But in the meantime it is a lot faster than dealing with the call center. Have a great day and let me know if you need anything further.    Sincerely,  Keira   Clinical Supervisor

## 2024-08-14 DIAGNOSIS — I10 HYPERTENSION, ESSENTIAL: ICD-10-CM

## 2024-08-15 RX ORDER — AMLODIPINE BESYLATE 5 MG/1
7.5 TABLET ORAL DAILY
Qty: 90 TABLET | Refills: 0 | Status: SHIPPED | OUTPATIENT
Start: 2024-08-15

## 2024-08-15 RX ORDER — LISINOPRIL 20 MG/1
20 TABLET ORAL DAILY
Qty: 90 TABLET | Refills: 0 | Status: SHIPPED | OUTPATIENT
Start: 2024-08-15

## 2024-08-20 ENCOUNTER — HOSPITAL ENCOUNTER (OUTPATIENT)
Dept: ULTRASOUND IMAGING | Age: 84
Discharge: HOME | End: 2024-08-20
Payer: MEDICARE

## 2024-08-20 DIAGNOSIS — I65.23 BILATERAL CAROTID ARTERY STENOSIS: ICD-10-CM

## 2024-08-20 PROCEDURE — 93880 EXTRACRANIAL BILAT STUDY: CPT

## 2024-08-23 DIAGNOSIS — F51.01 PRIMARY INSOMNIA: ICD-10-CM

## 2024-08-23 RX ORDER — TRAZODONE HYDROCHLORIDE 50 MG/1
50 TABLET ORAL NIGHTLY
Qty: 30 TABLET | Refills: 1 | Status: SHIPPED | OUTPATIENT
Start: 2024-08-23

## 2024-09-09 ENCOUNTER — OFFICE VISIT (OUTPATIENT)
Dept: PRIMARY CARE CLINIC | Age: 84
End: 2024-09-09
Payer: MEDICARE

## 2024-09-09 VITALS
OXYGEN SATURATION: 95 % | SYSTOLIC BLOOD PRESSURE: 136 MMHG | TEMPERATURE: 97.7 F | DIASTOLIC BLOOD PRESSURE: 70 MMHG | HEART RATE: 92 BPM | HEIGHT: 61 IN | BODY MASS INDEX: 39.13 KG/M2

## 2024-09-09 DIAGNOSIS — I10 HYPERTENSION, ESSENTIAL: Primary | ICD-10-CM

## 2024-09-09 DIAGNOSIS — E11.9 TYPE 2 DIABETES MELLITUS WITHOUT COMPLICATION, WITHOUT LONG-TERM CURRENT USE OF INSULIN (HCC): ICD-10-CM

## 2024-09-09 DIAGNOSIS — G47.33 OSA (OBSTRUCTIVE SLEEP APNEA): ICD-10-CM

## 2024-09-09 DIAGNOSIS — F51.01 PRIMARY INSOMNIA: ICD-10-CM

## 2024-09-09 DIAGNOSIS — E78.2 HYPERLIPIDEMIA, MIXED: ICD-10-CM

## 2024-09-09 PROCEDURE — 3075F SYST BP GE 130 - 139MM HG: CPT | Performed by: INTERNAL MEDICINE

## 2024-09-09 PROCEDURE — 99213 OFFICE O/P EST LOW 20 MIN: CPT | Performed by: INTERNAL MEDICINE

## 2024-09-09 PROCEDURE — 3078F DIAST BP <80 MM HG: CPT | Performed by: INTERNAL MEDICINE

## 2024-09-09 PROCEDURE — 3051F HG A1C>EQUAL 7.0%<8.0%: CPT | Performed by: INTERNAL MEDICINE

## 2024-09-09 PROCEDURE — 1123F ACP DISCUSS/DSCN MKR DOCD: CPT | Performed by: INTERNAL MEDICINE

## 2024-09-09 RX ORDER — SIMVASTATIN 20 MG
20 TABLET ORAL NIGHTLY
Qty: 90 TABLET | Refills: 0 | Status: SHIPPED | OUTPATIENT
Start: 2024-09-09 | End: 2024-12-08

## 2024-09-09 RX ORDER — TRAZODONE HYDROCHLORIDE 50 MG/1
50 TABLET, FILM COATED ORAL NIGHTLY
Qty: 30 TABLET | Refills: 0 | Status: SHIPPED | OUTPATIENT
Start: 2024-09-09

## 2024-09-25 ENCOUNTER — TELEPHONE (OUTPATIENT)
Dept: PRIMARY CARE CLINIC | Age: 84
End: 2024-09-25

## 2024-09-25 DIAGNOSIS — Z89.512 HX OF BELOW KNEE AMPUTATION, LEFT (HCC): ICD-10-CM

## 2024-09-25 DIAGNOSIS — M62.81 WEAKNESS OF BOTH QUADRICEPS MUSCLES: Primary | ICD-10-CM

## 2024-09-25 DIAGNOSIS — R53.1 ACUTE WEAKNESS: ICD-10-CM

## 2024-09-25 DIAGNOSIS — R26.89 BALANCE PROBLEM: ICD-10-CM

## 2024-09-25 NOTE — TELEPHONE ENCOUNTER
Patient would like to know if you can order her some physical therapy to help her walk/balance and would like to have them come to her house. Having trouble getting around.

## 2024-09-26 NOTE — TELEPHONE ENCOUNTER
Send referral to [facility] fax # [phone]    Patient called and requested in home therapy due to her being in a wheelchair and unable to get out of house

## 2024-09-30 ENCOUNTER — TELEPHONE (OUTPATIENT)
Dept: PRIMARY CARE CLINIC | Age: 84
End: 2024-09-30

## 2024-10-02 ENCOUNTER — TELEPHONE (OUTPATIENT)
Dept: PRIMARY CARE CLINIC | Age: 84
End: 2024-10-02

## 2024-10-02 DIAGNOSIS — M62.81 WEAKNESS OF BOTH QUADRICEPS MUSCLES: ICD-10-CM

## 2024-10-02 DIAGNOSIS — Z47.89 ENCOUNTER FOR PROSTHETIC GAIT TRAINING: ICD-10-CM

## 2024-10-02 DIAGNOSIS — R26.2 DIFFICULTY WALKING: Primary | ICD-10-CM

## 2024-10-02 DIAGNOSIS — R26.89 BALANCE PROBLEM: ICD-10-CM

## 2024-10-02 NOTE — TELEPHONE ENCOUNTER
Home health care called and states patient does not qualify for in home physical therapy, so we need a new referral to [facility] for out patient PT and also needs transportation to her appointments.  She needs PT for Difficulty walking and New Prosthetic training

## 2024-10-09 ENCOUNTER — TELEPHONE (OUTPATIENT)
Dept: PRIMARY CARE CLINIC | Age: 84
End: 2024-10-09

## 2024-10-09 NOTE — TELEPHONE ENCOUNTER
Patient called and stated that she wants to make sure that [facility] was cancelled for tomorrow. Insurance doesn't cover patient will be going to [place].     Called [facility] to verfied. Everyrhing cancelled.

## 2024-10-23 ENCOUNTER — HOSPITAL ENCOUNTER (INPATIENT)
Age: 84
LOS: 3 days | Discharge: HOME | DRG: 377 | End: 2024-10-26
Attending: EMERGENCY MEDICINE | Admitting: INTERNAL MEDICINE
Payer: MEDICARE

## 2024-10-23 ENCOUNTER — APPOINTMENT (OUTPATIENT)
Dept: GENERAL RADIOLOGY | Age: 84
DRG: 377 | End: 2024-10-23
Payer: MEDICARE

## 2024-10-23 DIAGNOSIS — D64.9 ACUTE ANEMIA: ICD-10-CM

## 2024-10-23 DIAGNOSIS — D62 ACUTE BLOOD LOSS ANEMIA: Primary | ICD-10-CM

## 2024-10-23 DIAGNOSIS — F51.01 PRIMARY INSOMNIA: ICD-10-CM

## 2024-10-23 DIAGNOSIS — R06.09 DYSPNEA ON EXERTION: ICD-10-CM

## 2024-10-23 LAB
ANION GAP SERPL CALCULATED.3IONS-SCNC: 10 MMOL/L (ref 7–16)
BASOPHILS # BLD: 0.03 K/UL (ref 0–0.2)
BASOPHILS NFR BLD: 0 % (ref 0–2)
BNP SERPL-MCNC: 513 PG/ML (ref 0–450)
BUN SERPL-MCNC: 13 MG/DL (ref 6–23)
CALCIUM SERPL-MCNC: 8.9 MG/DL (ref 8.6–10.2)
CHLORIDE SERPL-SCNC: 105 MMOL/L (ref 98–107)
CO2 SERPL-SCNC: 25 MMOL/L (ref 22–29)
CREAT SERPL-MCNC: 0.7 MG/DL (ref 0.5–1)
EOSINOPHIL # BLD: 0.19 K/UL (ref 0.05–0.5)
EOSINOPHILS RELATIVE PERCENT: 2 % (ref 0–6)
ERYTHROCYTE [DISTWIDTH] IN BLOOD BY AUTOMATED COUNT: 18.3 % (ref 11.5–15)
FLUAV RNA RESP QL NAA+PROBE: NOT DETECTED
FLUBV RNA RESP QL NAA+PROBE: NOT DETECTED
GFR, ESTIMATED: 85 ML/MIN/1.73M2
GLUCOSE SERPL-MCNC: 100 MG/DL (ref 74–99)
HCT VFR BLD AUTO: 21.7 % (ref 34–48)
HGB BLD-MCNC: 6 G/DL (ref 11.5–15.5)
IMM GRANULOCYTES # BLD AUTO: 0.04 K/UL (ref 0–0.58)
IMM GRANULOCYTES NFR BLD: 0 % (ref 0–5)
LYMPHOCYTES NFR BLD: 3.52 K/UL (ref 1.5–4)
LYMPHOCYTES RELATIVE PERCENT: 39 % (ref 20–42)
MCH RBC QN AUTO: 18.5 PG (ref 26–35)
MCHC RBC AUTO-ENTMCNC: 27.6 G/DL (ref 32–34.5)
MCV RBC AUTO: 67 FL (ref 80–99.9)
MONOCYTES NFR BLD: 0.95 K/UL (ref 0.1–0.95)
MONOCYTES NFR BLD: 10 % (ref 2–12)
NEUTROPHILS NFR BLD: 48 % (ref 43–80)
NEUTS SEG NFR BLD: 4.37 K/UL (ref 1.8–7.3)
PLATELET # BLD AUTO: 458 K/UL (ref 130–450)
PMV BLD AUTO: 10.3 FL (ref 7–12)
POTASSIUM SERPL-SCNC: 3.9 MMOL/L (ref 3.5–5)
RBC # BLD AUTO: 3.24 M/UL (ref 3.5–5.5)
RBC # BLD: ABNORMAL 10*6/UL
SARS-COV-2 RNA RESP QL NAA+PROBE: NOT DETECTED
SODIUM SERPL-SCNC: 140 MMOL/L (ref 132–146)
SOURCE: NORMAL
SPECIMEN DESCRIPTION: NORMAL
TROPONIN I SERPL HS-MCNC: 15 NG/L (ref 0–9)
WBC OTHER # BLD: 9.1 K/UL (ref 4.5–11.5)

## 2024-10-23 PROCEDURE — 6360000002 HC RX W HCPCS: Performed by: EMERGENCY MEDICINE

## 2024-10-23 PROCEDURE — 80048 BASIC METABOLIC PNL TOTAL CA: CPT

## 2024-10-23 PROCEDURE — 71046 X-RAY EXAM CHEST 2 VIEWS: CPT

## 2024-10-23 PROCEDURE — 87636 SARSCOV2 & INF A&B AMP PRB: CPT

## 2024-10-23 PROCEDURE — 86900 BLOOD TYPING SEROLOGIC ABO: CPT

## 2024-10-23 PROCEDURE — 99285 EMERGENCY DEPT VISIT HI MDM: CPT

## 2024-10-23 PROCEDURE — 93005 ELECTROCARDIOGRAM TRACING: CPT | Performed by: EMERGENCY MEDICINE

## 2024-10-23 PROCEDURE — 94640 AIRWAY INHALATION TREATMENT: CPT

## 2024-10-23 PROCEDURE — 83880 ASSAY OF NATRIURETIC PEPTIDE: CPT

## 2024-10-23 PROCEDURE — 84484 ASSAY OF TROPONIN QUANT: CPT

## 2024-10-23 PROCEDURE — 86850 RBC ANTIBODY SCREEN: CPT

## 2024-10-23 PROCEDURE — 85025 COMPLETE CBC W/AUTO DIFF WBC: CPT

## 2024-10-23 PROCEDURE — 86901 BLOOD TYPING SEROLOGIC RH(D): CPT

## 2024-10-23 PROCEDURE — 86923 COMPATIBILITY TEST ELECTRIC: CPT

## 2024-10-23 PROCEDURE — 1200000000 HC SEMI PRIVATE

## 2024-10-23 RX ORDER — ALBUTEROL SULFATE 0.83 MG/ML
2.5 SOLUTION RESPIRATORY (INHALATION) ONCE
Status: COMPLETED | OUTPATIENT
Start: 2024-10-23 | End: 2024-10-23

## 2024-10-23 RX ORDER — VITAMIN B COMPLEX
1000 TABLET ORAL DAILY
Status: DISCONTINUED | OUTPATIENT
Start: 2024-10-24 | End: 2024-10-26 | Stop reason: HOSPADM

## 2024-10-23 RX ORDER — DEXTROSE MONOHYDRATE 100 MG/ML
INJECTION, SOLUTION INTRAVENOUS CONTINUOUS PRN
Status: DISCONTINUED | OUTPATIENT
Start: 2024-10-23 | End: 2024-10-26 | Stop reason: HOSPADM

## 2024-10-23 RX ORDER — GLUCAGON 1 MG/ML
1 KIT INJECTION PRN
Status: DISCONTINUED | OUTPATIENT
Start: 2024-10-23 | End: 2024-10-26 | Stop reason: HOSPADM

## 2024-10-23 RX ORDER — AMLODIPINE BESYLATE 5 MG/1
7.5 TABLET ORAL DAILY
Status: DISCONTINUED | OUTPATIENT
Start: 2024-10-24 | End: 2024-10-26 | Stop reason: HOSPADM

## 2024-10-23 RX ORDER — SODIUM CHLORIDE 9 MG/ML
INJECTION, SOLUTION INTRAVENOUS PRN
Status: DISCONTINUED | OUTPATIENT
Start: 2024-10-23 | End: 2024-10-26 | Stop reason: HOSPADM

## 2024-10-23 RX ORDER — MECOBALAMIN 5000 MCG
10 TABLET,DISINTEGRATING ORAL NIGHTLY PRN
Status: DISCONTINUED | OUTPATIENT
Start: 2024-10-23 | End: 2024-10-26 | Stop reason: HOSPADM

## 2024-10-23 RX ORDER — TRAZODONE HYDROCHLORIDE 50 MG/1
50 TABLET, FILM COATED ORAL NIGHTLY
Status: DISCONTINUED | OUTPATIENT
Start: 2024-10-23 | End: 2024-10-26 | Stop reason: HOSPADM

## 2024-10-23 RX ORDER — POTASSIUM CHLORIDE 1500 MG/1
40 TABLET, EXTENDED RELEASE ORAL PRN
Status: DISCONTINUED | OUTPATIENT
Start: 2024-10-23 | End: 2024-10-26 | Stop reason: HOSPADM

## 2024-10-23 RX ORDER — LISINOPRIL 20 MG/1
20 TABLET ORAL DAILY
Status: DISCONTINUED | OUTPATIENT
Start: 2024-10-23 | End: 2024-10-26 | Stop reason: HOSPADM

## 2024-10-23 RX ORDER — TRAZODONE HYDROCHLORIDE 50 MG/1
50 TABLET, FILM COATED ORAL NIGHTLY
Qty: 30 TABLET | Refills: 0 | Status: SHIPPED
Start: 2024-10-23 | End: 2024-11-04 | Stop reason: ALTCHOICE

## 2024-10-23 RX ORDER — ACETAMINOPHEN 325 MG/1
650 TABLET ORAL EVERY 6 HOURS PRN
Status: DISCONTINUED | OUTPATIENT
Start: 2024-10-23 | End: 2024-10-26 | Stop reason: HOSPADM

## 2024-10-23 RX ORDER — ATORVASTATIN CALCIUM 10 MG/1
10 TABLET, FILM COATED ORAL NIGHTLY
Status: DISCONTINUED | OUTPATIENT
Start: 2024-10-24 | End: 2024-10-26 | Stop reason: HOSPADM

## 2024-10-23 RX ORDER — TRAZODONE HYDROCHLORIDE 50 MG/1
50 TABLET, FILM COATED ORAL NIGHTLY
Qty: 30 TABLET | Refills: 0 | OUTPATIENT
Start: 2024-10-23

## 2024-10-23 RX ORDER — MAGNESIUM SULFATE IN WATER 40 MG/ML
2000 INJECTION, SOLUTION INTRAVENOUS PRN
Status: DISCONTINUED | OUTPATIENT
Start: 2024-10-23 | End: 2024-10-26 | Stop reason: HOSPADM

## 2024-10-23 RX ORDER — PROCHLORPERAZINE EDISYLATE 5 MG/ML
10 INJECTION INTRAMUSCULAR; INTRAVENOUS EVERY 6 HOURS PRN
Status: DISCONTINUED | OUTPATIENT
Start: 2024-10-23 | End: 2024-10-26 | Stop reason: HOSPADM

## 2024-10-23 RX ORDER — ASPIRIN 81 MG/1
81 TABLET ORAL DAILY
Status: DISCONTINUED | OUTPATIENT
Start: 2024-10-23 | End: 2024-10-26 | Stop reason: HOSPADM

## 2024-10-23 RX ORDER — POTASSIUM CHLORIDE 7.45 MG/ML
10 INJECTION INTRAVENOUS PRN
Status: DISCONTINUED | OUTPATIENT
Start: 2024-10-23 | End: 2024-10-26 | Stop reason: HOSPADM

## 2024-10-23 RX ADMIN — ALBUTEROL SULFATE 2.5 MG: 2.5 SOLUTION RESPIRATORY (INHALATION) at 16:13

## 2024-10-23 ASSESSMENT — LIFESTYLE VARIABLES
HOW OFTEN DO YOU HAVE A DRINK CONTAINING ALCOHOL: MONTHLY OR LESS
HOW MANY STANDARD DRINKS CONTAINING ALCOHOL DO YOU HAVE ON A TYPICAL DAY: 1 OR 2

## 2024-10-23 NOTE — TELEPHONE ENCOUNTER
Name of Medication(s) Requested:  Requested Prescriptions     Pending Prescriptions Disp Refills    traZODone (DESYREL) 50 MG tablet [Pharmacy Med Name: traZODone HCl Oral Tablet 50 MG] 30 tablet 0     Sig: take 1 tablet by mouth nightly       Medication is on current medication list Yes    Dosage and directions were verified? Yes    Quantity verified: 30 day supply     Pharmacy Verified?  Yes    Last Appointment:  9/9/2024    Future appts:  Future Appointments   Date Time Provider Department Center   11/4/2024 12:00 PM Lu Nevarez MD [facility]   12/13/2024  1:00 PM Lu Nevarez MD [facility] DEP        (If no appt send self scheduling link. .REFILLAPPT)  Scheduling request sent?     [] Yes  [x] No    Does patient need updated?  [] Yes  [x] No

## 2024-10-23 NOTE — H&P
vertigo or visual changes    Cardiovascular:   Denies any chest pain, irregular heartbeats, or palpitations. No paroxysmal nocturnal dyspnea.    Respiratory:   Denies coughing, sputum production, hemoptysis, or wheezing.  No orthopnea. Endorses shortness of breath with minimal exertion.     Gastrointestinal:   Denies nausea, vomiting, or diarrhea.  Denies any abdominal pain.  Endorses bloody bowel movements for the last week, history of hemorrhoids, and constipation for a whole week.     Genito-Urinary:    Denies any urgency, frequency, hematuria.  Voiding without difficulty.    Musculoskeletal:   Endorses LLE BKA due to accident at work years ago; she recently had an injury and her original prosthesis is no longer fitting. It is being adjusted still to accommodate her leg so she is wheelchair bound.     Neurology:    Denies any headache or focal neurological deficits. No weakness or paresthesia.    Derm:    Denies any rashes, ulcers, or excoriations.  Denies bruising.      Extremities:   Denies any lower extremity swelling or edema. Left BKA.       PHYSICAL EXAM:  VITALS:  Vitals:    10/23/24 1533   BP: (!) 179/96   Pulse: 87   Resp: 18   Temp:    SpO2: 98%         CONSTITUTIONAL:    Awake, alert, cooperative, no apparent distress, appears stated age, very pleasant.     EYES:    PERRL, EOMI, sclera clear, conjunctiva normal    ENT:    Normocephalic, atraumatic, sinuses nontender on palpation. External ears without lesions. Oral pharynx with moist mucus membranes.  Tonsils without erythema or exudates.    NECK:    Supple, symmetrical, trachea midline, no adenopathy, thyroid symmetric, not enlarged and no tenderness, skin normal, no bruits, no JVD    HEMATOLOGIC/LYMPHATICS:    No cervical lymphadenopathy and no supraclavicular lymphadenopathy    LUNGS:    Symmetric. No increased work of breathing, good air exchange, clear to auscultation bilaterally, no wheezes, rhonchi, or rales, on room air.     CARDIOVASCULAR:   is detected.  There is a mildly diminished inspiratory effort.  Lateral view demonstrates blunting of the posterior costophrenic angles which may reflect small effusions.  The cardiomediastinal silhouette is without acute process. The left heart is mildly prominent, unchanged.  The osseous structures are without acute process.  Postop changes in the right axilla and chest wall.     1.  Lungs appear clear. 2.  Small pleural effusions, prominent left ventricle and minimal pulmonary venous hypertension.       ASSESSMENT:  Symptomatic anemia  Depression on trazodone  Hyperlipidemia on simvastatin  Essential hypertension on amlodipine, lisinopril  Non-insulin dependent diabetes mellitus type II on metformin  AFIB on apixaban  CAD on ASA  History of right breast cancer      PLAN:  Admit to monitored floor. Home medications will be reconciled. Lab work ordered for morning. Antiemetics, antipyretics, as needed pain medicine, electrolyte supplementation, hypoglycemia recovery medications, blood sugars AC & HS have been ordered. Consults will be made to gastroenterology. SCDs for VTE prophylaxis, pantoprazole for GI prophylaxis. ASA and eliquis on hold. NPO at midnight.       The patient was seen, examined and then discussed with Dr. Curiel.      JANETH Helms CNP  7:48 PM  10/23/2024    Electronically signed by JANETH Helms CNP on 10/23/24 at 7:48 PM EDT      I have personally participated in the history, exam, medical decision making with the nurse practitioner on the date of service and I agree with all the pertinent clinical information unless otherwise noted.  I have also reviewed and agree with the past medical, family, and social history unless otherwise noted.      Brendan Curiel DO,., FACOI  5:46 AM  10/24/2024

## 2024-10-23 NOTE — ED PROVIDER NOTES
[facility] EMERGENCY DEPARTMENT  EMERGENCY DEPARTMENT ENCOUNTER        Pt Name: Coral Coley  MRN: [id number]  Birthdate 1940  Date of evaluation: 10/23/2024  Provider: Sidney Arredondo DO  PCP: Lu Nevarez MD  Note Started: 4:07 PM EDT 10/23/24    CHIEF COMPLAINT       Chief Complaint   Patient presents with    Shortness of Breath     Pt complaint of shortness of breath. States she feels more SOB upon exertion.        HISTORY OF PRESENT ILLNESS: 1 or more Elements   History From: patient    Limitations to history : None    Coral Coley is a 83 y.o. female who presents to the ED for evaluation of dyspnea on exertion.  Patient states that with very little exertion she gets short of breath.  She denies any chest pain.  Denies fever or chills.  Denies cough.  No runny nose or congestion however states sometimes in the morning she has runny nose but states that is a chronic issue.  She is on Eliquis for atrial fibrillation.  Has a prosthesis but is being repaired so has been in a wheelchair for several months.  No associated dizziness or lightness.  No syncope.  No history of congestive heart failure.  Denies any swelling in her right lower extremity from baseline.    Nursing Notes were all reviewed and agreed with or any disagreements were addressed in the HPI.      REVIEW OF EXTERNAL NOTE :       Chart review shows that on 12/1/2020 patient had echo which showed patient's EF to be approximately 55 to 60%.  Left ventricular size is normal.  Borderline concentric left ventricular hypertrophy.    REVIEW OF SYSTEMS :           Positives and Pertinent negatives as per HPI.     SURGICAL HISTORY     Past Surgical History:   Procedure Laterality Date    AMPUTATION Left 1992    BKA- wears prosthesis    BREAST SURGERY Right 2004    lumpectomy    CHOLECYSTECTOMY      COLONOSCOPY      HYSTERECTOMY (CERVIX STATUS UNKNOWN)      IR CAROTID STENT W PROTECTION  01/22/2021  administration in time range)   potassium chloride (KLOR-CON M) extended release tablet 40 mEq (has no administration in time range)     Or   potassium bicarb-citric acid (EFFER-K) effervescent tablet 40 mEq (has no administration in time range)     Or   potassium chloride 10 mEq/100 mL IVPB (Peripheral Line) (has no administration in time range)   acetaminophen (TYLENOL) tablet 650 mg (has no administration in time range)   prochlorperazine (COMPAZINE) injection 10 mg (has no administration in time range)   glucose chewable tablet 16 g (has no administration in time range)   dextrose bolus 10% 125 mL (has no administration in time range)     Or   dextrose bolus 10% 250 mL (has no administration in time range)   glucagon injection 1 mg (has no administration in time range)   dextrose 10 % infusion (has no administration in time range)   albuterol (PROVENTIL) (2.5 MG/3ML) 0.083% nebulizer solution 2.5 mg (2.5 mg Nebulization Given 10/23/24 1613)           Is this patient to be included in the SEP-1 Core Measure due to severe sepsis or septic shock?   No Exclusion criteria - the patient is NOT to be included for SEP-1 Core Measure due to: Infection is not suspected        Medical Decision Making/Differential Diagnosis:    CC/HPI Summary, Social Determinants of health, Records Reviewed, DDx, testing done/not done, ED Course, Reassessment, disposition considerations/shared decision making with patient, consults, disposition:      ED Course as of 10/23/24 2354   Wed Oct 23, 2024   1909 Patient's hemoglobin was 6.  She has noted occasional bright red blood in the stool but attributes that to hemorrhoids.  She is on Eliquis.  Hemoccult was performed and was slightly positive. [MS]   1934 Spoke with Nisreen Pal CNP for Dr. Preston (Medicine).  Discussed case.  They will admit this patient   [MS]      ED Course User Index  [MS] Sidney Arredondo, DO        Medical Decision Making  Amount and/or Complexity of Data

## 2024-10-23 NOTE — TELEPHONE ENCOUNTER
Name of Medication(s) Requested:  Requested Prescriptions     Pending Prescriptions Disp Refills    traZODone (DESYREL) 50 MG tablet 30 tablet 0     Sig: Take 1 tablet by mouth nightly       Medication is on current medication list Yes    Dosage and directions were verified? Yes    Quantity verified: 30 day supply     Pharmacy Verified?  Yes    Last Appointment:  9/9/2024    Future appts:  Future Appointments   Date Time Provider Department Center   11/4/2024 12:00 PM Lu Nevarez MD [facility] DEP   12/13/2024  1:00 PM Lu Nevarez MD [facility] DEP        (If no appt send self scheduling link. .REFILLAPPT)  Scheduling request sent?     [] Yes  [x] No    Does patient need updated?  [] Yes  [] No

## 2024-10-24 ENCOUNTER — APPOINTMENT (OUTPATIENT)
Dept: CT IMAGING | Age: 84
DRG: 377 | End: 2024-10-24
Payer: MEDICARE

## 2024-10-24 LAB
25(OH)D3 SERPL-MCNC: 37.6 NG/ML (ref 30–100)
ALBUMIN SERPL-MCNC: 3.3 G/DL (ref 3.5–5.2)
ALP SERPL-CCNC: 70 U/L (ref 35–104)
ALT SERPL-CCNC: 7 U/L (ref 0–32)
ANION GAP SERPL CALCULATED.3IONS-SCNC: 8 MMOL/L (ref 7–16)
AST SERPL-CCNC: 14 U/L (ref 0–31)
BASOPHILS # BLD: 0.08 K/UL (ref 0–0.2)
BASOPHILS NFR BLD: 1 % (ref 0–2)
BILIRUB SERPL-MCNC: 0.7 MG/DL (ref 0–1.2)
BILIRUB UR QL STRIP: NEGATIVE
BUN SERPL-MCNC: 12 MG/DL (ref 6–23)
CALCIUM SERPL-MCNC: 8.4 MG/DL (ref 8.6–10.2)
CEA SERPL-MCNC: 1.3 NG/ML (ref 0–5.2)
CHLORIDE SERPL-SCNC: 105 MMOL/L (ref 98–107)
CHOLEST SERPL-MCNC: 106 MG/DL
CLARITY UR: CLEAR
CO2 SERPL-SCNC: 26 MMOL/L (ref 22–29)
COLOR UR: YELLOW
CREAT SERPL-MCNC: 0.6 MG/DL (ref 0.5–1)
EKG ATRIAL RATE: 92 BPM
EKG P AXIS: 54 DEGREES
EKG P-R INTERVAL: 150 MS
EKG Q-T INTERVAL: 380 MS
EKG QRS DURATION: 80 MS
EKG QTC CALCULATION (BAZETT): 469 MS
EKG R AXIS: -7 DEGREES
EKG T AXIS: 84 DEGREES
EKG VENTRICULAR RATE: 92 BPM
EOSINOPHIL # BLD: 0.16 K/UL (ref 0.05–0.5)
EOSINOPHILS RELATIVE PERCENT: 2 % (ref 0–6)
EPI CELLS #/AREA URNS HPF: NORMAL /HPF
ERYTHROCYTE [DISTWIDTH] IN BLOOD BY AUTOMATED COUNT: 21.6 % (ref 11.5–15)
FOLATE SERPL-MCNC: 12.2 NG/ML (ref 4.8–24.2)
GFR, ESTIMATED: 87 ML/MIN/1.73M2
GLUCOSE BLD-MCNC: 125 MG/DL (ref 74–99)
GLUCOSE BLD-MCNC: 132 MG/DL (ref 74–99)
GLUCOSE BLD-MCNC: 133 MG/DL (ref 74–99)
GLUCOSE BLD-MCNC: 153 MG/DL (ref 74–99)
GLUCOSE BLD-MCNC: 216 MG/DL (ref 74–99)
GLUCOSE SERPL-MCNC: 122 MG/DL (ref 74–99)
GLUCOSE UR STRIP-MCNC: NEGATIVE MG/DL
HCT VFR BLD AUTO: 23.8 % (ref 34–48)
HCT VFR BLD AUTO: 28.2 % (ref 34–48)
HCT VFR BLD AUTO: 32.8 % (ref 34–48)
HDLC SERPL-MCNC: 52 MG/DL
HGB BLD-MCNC: 6.9 G/DL (ref 11.5–15.5)
HGB BLD-MCNC: 8.1 G/DL (ref 11.5–15.5)
HGB BLD-MCNC: 9.8 G/DL (ref 11.5–15.5)
HGB UR QL STRIP.AUTO: NEGATIVE
IRON SATN MFR SERPL: 13 % (ref 15–50)
IRON SERPL-MCNC: 46 UG/DL (ref 37–145)
KETONES UR STRIP-MCNC: NEGATIVE MG/DL
LDLC SERPL CALC-MCNC: 40 MG/DL
LEUKOCYTE ESTERASE UR QL STRIP: NEGATIVE
LYMPHOCYTES NFR BLD: 1.64 K/UL (ref 1.5–4)
LYMPHOCYTES RELATIVE PERCENT: 20 % (ref 20–42)
MAGNESIUM SERPL-MCNC: 2 MG/DL (ref 1.6–2.6)
MCH RBC QN AUTO: 20.5 PG (ref 26–35)
MCHC RBC AUTO-ENTMCNC: 29 G/DL (ref 32–34.5)
MCV RBC AUTO: 70.8 FL (ref 80–99.9)
MONOCYTES NFR BLD: 1.15 K/UL (ref 0.1–0.95)
MONOCYTES NFR BLD: 14 % (ref 2–12)
NEUTROPHILS NFR BLD: 63 % (ref 43–80)
NEUTS SEG NFR BLD: 5.17 K/UL (ref 1.8–7.3)
NITRITE UR QL STRIP: NEGATIVE
PH UR STRIP: 7 [PH] (ref 5–9)
PHOSPHATE SERPL-MCNC: 3.6 MG/DL (ref 2.5–4.5)
PLATELET, FLUORESCENCE: 395 K/UL (ref 130–450)
PMV BLD AUTO: 10.7 FL (ref 7–12)
POTASSIUM SERPL-SCNC: 3.7 MMOL/L (ref 3.5–5)
PROT SERPL-MCNC: 6.1 G/DL (ref 6.4–8.3)
PROT UR STRIP-MCNC: NEGATIVE MG/DL
RBC # BLD AUTO: 3.36 M/UL (ref 3.5–5.5)
RBC # BLD: ABNORMAL 10*6/UL
RBC #/AREA URNS HPF: NORMAL /HPF
SODIUM SERPL-SCNC: 139 MMOL/L (ref 132–146)
SP GR UR STRIP: 1.01 (ref 1–1.03)
T4 FREE SERPL-MCNC: 1.1 NG/DL (ref 0.9–1.7)
TIBC SERPL-MCNC: 363 UG/DL (ref 250–450)
TRIGL SERPL-MCNC: 69 MG/DL
TROPONIN I SERPL HS-MCNC: 17 NG/L (ref 0–9)
TSH SERPL DL<=0.05 MIU/L-ACNC: 3.61 UIU/ML (ref 0.27–4.2)
UROBILINOGEN UR STRIP-ACNC: 0.2 EU/DL (ref 0–1)
VIT B12 SERPL-MCNC: 228 PG/ML (ref 211–946)
VLDLC SERPL CALC-MCNC: 14 MG/DL
WBC #/AREA URNS HPF: NORMAL /HPF
WBC OTHER # BLD: 8.2 K/UL (ref 4.5–11.5)

## 2024-10-24 PROCEDURE — 84443 ASSAY THYROID STIM HORMONE: CPT

## 2024-10-24 PROCEDURE — 97165 OT EVAL LOW COMPLEX 30 MIN: CPT

## 2024-10-24 PROCEDURE — 6360000004 HC RX CONTRAST MEDICATION: Performed by: RADIOLOGY

## 2024-10-24 PROCEDURE — 97535 SELF CARE MNGMENT TRAINING: CPT

## 2024-10-24 PROCEDURE — 82962 GLUCOSE BLOOD TEST: CPT

## 2024-10-24 PROCEDURE — 30233N1 TRANSFUSION OF NONAUTOLOGOUS RED BLOOD CELLS INTO PERIPHERAL VEIN, PERCUTANEOUS APPROACH: ICD-10-PCS | Performed by: INTERNAL MEDICINE

## 2024-10-24 PROCEDURE — 82746 ASSAY OF FOLIC ACID SERUM: CPT

## 2024-10-24 PROCEDURE — 80053 COMPREHEN METABOLIC PANEL: CPT

## 2024-10-24 PROCEDURE — 93010 ELECTROCARDIOGRAM REPORT: CPT | Performed by: INTERNAL MEDICINE

## 2024-10-24 PROCEDURE — 74177 CT ABD & PELVIS W/CONTRAST: CPT

## 2024-10-24 PROCEDURE — 2580000003 HC RX 258

## 2024-10-24 PROCEDURE — 36415 COLL VENOUS BLD VENIPUNCTURE: CPT

## 2024-10-24 PROCEDURE — 6360000002 HC RX W HCPCS

## 2024-10-24 PROCEDURE — 6370000000 HC RX 637 (ALT 250 FOR IP)

## 2024-10-24 PROCEDURE — 6360000002 HC RX W HCPCS: Performed by: EMERGENCY MEDICINE

## 2024-10-24 PROCEDURE — 85018 HEMOGLOBIN: CPT

## 2024-10-24 PROCEDURE — 85025 COMPLETE CBC W/AUTO DIFF WBC: CPT

## 2024-10-24 PROCEDURE — 83540 ASSAY OF IRON: CPT

## 2024-10-24 PROCEDURE — 2580000003 HC RX 258: Performed by: EMERGENCY MEDICINE

## 2024-10-24 PROCEDURE — 83550 IRON BINDING TEST: CPT

## 2024-10-24 PROCEDURE — 84100 ASSAY OF PHOSPHORUS: CPT

## 2024-10-24 PROCEDURE — 84439 ASSAY OF FREE THYROXINE: CPT

## 2024-10-24 PROCEDURE — 83735 ASSAY OF MAGNESIUM: CPT

## 2024-10-24 PROCEDURE — 1200000000 HC SEMI PRIVATE

## 2024-10-24 PROCEDURE — 84484 ASSAY OF TROPONIN QUANT: CPT

## 2024-10-24 PROCEDURE — 80061 LIPID PANEL: CPT

## 2024-10-24 PROCEDURE — 6370000000 HC RX 637 (ALT 250 FOR IP): Performed by: HOSPITALIST

## 2024-10-24 PROCEDURE — 82607 VITAMIN B-12: CPT

## 2024-10-24 PROCEDURE — 97161 PT EVAL LOW COMPLEX 20 MIN: CPT

## 2024-10-24 PROCEDURE — 36430 TRANSFUSION BLD/BLD COMPNT: CPT

## 2024-10-24 PROCEDURE — P9016 RBC LEUKOCYTES REDUCED: HCPCS

## 2024-10-24 PROCEDURE — 82378 CARCINOEMBRYONIC ANTIGEN: CPT

## 2024-10-24 PROCEDURE — 85014 HEMATOCRIT: CPT

## 2024-10-24 PROCEDURE — 81001 URINALYSIS AUTO W/SCOPE: CPT

## 2024-10-24 PROCEDURE — 82306 VITAMIN D 25 HYDROXY: CPT

## 2024-10-24 RX ORDER — FUROSEMIDE 10 MG/ML
40 INJECTION INTRAMUSCULAR; INTRAVENOUS ONCE
Status: COMPLETED | OUTPATIENT
Start: 2024-10-24 | End: 2024-10-24

## 2024-10-24 RX ORDER — IOPAMIDOL 755 MG/ML
75 INJECTION, SOLUTION INTRAVASCULAR
Status: COMPLETED | OUTPATIENT
Start: 2024-10-24 | End: 2024-10-24

## 2024-10-24 RX ORDER — INSULIN LISPRO 100 [IU]/ML
0-8 INJECTION, SOLUTION INTRAVENOUS; SUBCUTANEOUS
Status: DISCONTINUED | OUTPATIENT
Start: 2024-10-24 | End: 2024-10-26 | Stop reason: HOSPADM

## 2024-10-24 RX ORDER — SODIUM CHLORIDE 9 MG/ML
INJECTION, SOLUTION INTRAVENOUS PRN
Status: DISCONTINUED | OUTPATIENT
Start: 2024-10-24 | End: 2024-10-26 | Stop reason: HOSPADM

## 2024-10-24 RX ORDER — IOPAMIDOL 755 MG/ML
18 INJECTION, SOLUTION INTRAVASCULAR
Status: COMPLETED | OUTPATIENT
Start: 2024-10-24 | End: 2024-10-24

## 2024-10-24 RX ADMIN — POLYETHYLENE GLYCOL-3350 AND ELECTROLYTES 4000 ML: 236; 6.74; 5.86; 2.97; 22.74 POWDER, FOR SOLUTION ORAL at 18:44

## 2024-10-24 RX ADMIN — PANTOPRAZOLE SODIUM 40 MG: 40 INJECTION, POWDER, FOR SOLUTION INTRAVENOUS at 09:33

## 2024-10-24 RX ADMIN — TRAZODONE HYDROCHLORIDE 50 MG: 50 TABLET ORAL at 00:27

## 2024-10-24 RX ADMIN — IOPAMIDOL 75 ML: 755 INJECTION, SOLUTION INTRAVENOUS at 17:56

## 2024-10-24 RX ADMIN — Medication 1000 UNITS: at 09:27

## 2024-10-24 RX ADMIN — ATORVASTATIN CALCIUM 10 MG: 10 TABLET, FILM COATED ORAL at 23:01

## 2024-10-24 RX ADMIN — LISINOPRIL 20 MG: 20 TABLET ORAL at 00:28

## 2024-10-24 RX ADMIN — SODIUM CHLORIDE 25 MG: 9 INJECTION, SOLUTION INTRAVENOUS at 15:29

## 2024-10-24 RX ADMIN — LISINOPRIL 20 MG: 20 TABLET ORAL at 09:27

## 2024-10-24 RX ADMIN — FUROSEMIDE 40 MG: 10 INJECTION, SOLUTION INTRAMUSCULAR; INTRAVENOUS at 18:53

## 2024-10-24 RX ADMIN — SODIUM CHLORIDE 100 MG: 9 INJECTION, SOLUTION INTRAVENOUS at 18:54

## 2024-10-24 RX ADMIN — PANTOPRAZOLE SODIUM 40 MG: 40 INJECTION, POWDER, FOR SOLUTION INTRAVENOUS at 00:33

## 2024-10-24 RX ADMIN — AMLODIPINE BESYLATE 7.5 MG: 5 TABLET ORAL at 09:27

## 2024-10-24 RX ADMIN — PANTOPRAZOLE SODIUM 40 MG: 40 INJECTION, POWDER, FOR SOLUTION INTRAVENOUS at 23:01

## 2024-10-24 RX ADMIN — TRAZODONE HYDROCHLORIDE 50 MG: 50 TABLET ORAL at 23:01

## 2024-10-24 RX ADMIN — IOPAMIDOL 18 ML: 755 INJECTION, SOLUTION INTRAVENOUS at 17:56

## 2024-10-24 NOTE — CONSULTS
CONSULT  Jose Marinelli M.D.  [facility]  Dr. Maureen Lee M.D.,  Dr. Pk Hills M.D.,  Dr. Tylor Jaime D.O.,  Dr. Deshaun Velásquez D.O. ,  Dr. Mohit Harris M.D.,          Coral Coley  83 y.o.  female      Re:\"symptomatic anemia with positive guaiac stool \"  Requesting physician: JANETH Robins/Dr. Curiel  Date:9:42 AM 10/24/2024          HPI: 83-year-old female patient seen in the hospital for above described issue.  She has presented with increasing shortness of breath especially on exertion and was found to be significantly anemic with hemoglobin as low as 6 g/dL.  Patient was transfused and hemoglobin has increased to 6.9 g/dL today.  Patient reports noticing some bright red bleeding per rectum for at least a week.  She denies any black or tarry stool.  Patient has remote history of left lower extremity amputation and currently her prosthetic leg is not fitting well so patient has been mostly wheelchair-bound since March of this year.  Patient denies any significant abdominal pain.  She denies nausea vomiting.  She denies hematemesis emesis of coffee-ground material.  Patient takes 81 mg of aspirin daily and Eliquis.  Patient reports colonoscopy over 10 years ago which appears to have been performed by Dr. Lee in our office.  According to the patient it was fairly unremarkable.    Information sources:   -Patient  -medical record  -health care team    PMHx:  Past Medical History:   Diagnosis Date    Atrial fibrillation (HCC)     Below knee amputation (HCC)     Left    Cancer (HCC)     breast, right    Carotid stenosis, right     Diabetes mellitus (HCC)     Heart murmur     Hx of radiation therapy     Hyperlipidemia     Hypertension     Major depressive disorder     Osteopenia     Osteoporosis     PONV (postoperative nausea and vomiting)     Syncope     TIA (transient ischemic attack)     Transient alteration of awareness 12/02/2022       PSHx:  Past Surgical History:    Procedure Laterality Date    AMPUTATION Left 1992    BKA- wears prosthesis    BREAST SURGERY Right 2004    lumpectomy    CHOLECYSTECTOMY      COLONOSCOPY      HYSTERECTOMY (CERVIX STATUS UNKNOWN)      IR CAROTID STENT W PROTECTION  01/22/2021    IR CAROTID STENT UNI W PROTECTION 1/22/2021 [id number]ED[facility]    ROTATOR CUFF REPAIR         Meds:  Current Facility-Administered Medications   Medication Dose Route Frequency Provider Last Rate Last Admin    0.9 % sodium chloride infusion   IntraVENous PRN Sidney Arredondo DO        amLODIPine (NORVASC) tablet 7.5 mg  7.5 mg Oral Daily Nisreen Pal APRN - CNP   7.5 mg at 10/24/24 0927    [Held by provider] apixaban (ELIQUIS) tablet 5 mg  5 mg Oral BID Nisreen Pal APRN - CNP        [Held by provider] aspirin EC tablet 81 mg  81 mg Oral Daily Nisreen Pal APRN - CNP        lisinopril (PRINIVIL;ZESTRIL) tablet 20 mg  20 mg Oral Daily Nisreen Pal APRN - CNP   20 mg at 10/24/24 0927    melatonin disintegrating tablet 10 mg  10 mg Oral Nightly PRN Nisreen Pal APRN - CNP        metFORMIN (GLUCOPHAGE) tablet 500 mg  500 mg Oral BID WC Nisreen Pal APRN - CNP        atorvastatin (LIPITOR) tablet 10 mg  10 mg Oral Nightly Nisreen Pal APRN - CNP        traZODone (DESYREL) tablet 50 mg  50 mg Oral Nightly Nisreen Pal APRN - CNP   50 mg at 10/24/24 0027    Vitamin D (CHOLECALCIFEROL) tablet 1,000 Units  1,000 Units Oral Daily Nisreen Pal APRN - CNP   1,000 Units at 10/24/24 0927    pantoprazole (PROTONIX) 40 mg in sodium chloride (PF) 0.9 % 10 mL injection  40 mg IntraVENous Q12H Nisreen Pal APRN - CNP   40 mg at 10/24/24 0933    sodium phosphate 15 mmol in sodium chloride 0.9 % 250 mL IVPB  15 mmol IntraVENous PRN Nisreen Pal APRN - CNP        magnesium sulfate 2000 mg in 50 mL IVPB premix  2,000 mg IntraVENous PRN Nisreen Pal APRN - CNP        potassium chloride (KLOR-CON M) extended release tablet 40 mEq  40 mEq Oral

## 2024-10-24 NOTE — PROGRESS NOTES
Increase range of motion 10% of affected joints    Strength BUE:  refer to OT eval  RLE:  4/5  LLE:  3/5  Increase strength in affected mm groups by 1/3 grade   Balance Sitting EOB:  good   Dynamic Standing:  fair +  Sitting EOB:  good   Dynamic Standing: good      Patient is Alert & Oriented x person, place, time, and situation and follows directions    Sensation:  Patient  denies numbness/tingling   Edema:  no   Endurance: fair      Vitals: room air   Blood Pressure at rest  Blood Pressure during session    Heart Rate at rest  Heart Rate during session    SPO2 at rest %  SPO2 during session %     Patient education  Patient educated on role of Physical Therapy, risks of immobility, safety and plan of care,  importance of mobility while in hospital , importance and purpose of adaptive device and adjusted to proper height for the patient., and safety      Patient response to education:   Pt verbalized understanding Pt demonstrated skill Pt requires further education in this area   Yes Partial Yes      Treatment:  Patient practiced and was instructed/facilitated in the following treatment: Patient receiving blood at this time. Pt assisted to EOB. Sat edge of bed 10 minutes with Supervision  to increase dynamic sitting balance and activity tolerance. Pt stood and took heel toe shuffles to HOB. Pt then requested to use bedside commode, stand pivot to bedside with bedside directly in front of patient per patient request.      Therapeutic Exercises:  not performed  x  reps.       At end of session, patient in bed with alarm call light and phone within reach,  all lines and tubes intact, nursing notified.      Patient would benefit from continued skilled Physical Therapy to improve functional independence and quality of life.         Patient's/ family goals   home resume OP PT    Time in  1312  Time out  1332    Total Treatment Time  0 minutes    Evaluation time includes thorough review of current medical information,

## 2024-10-24 NOTE — CONSENT
Informed Consent for Blood Component Transfusion Note    I have discussed with the patient the rationale for blood component transfusion; its benefits in treating or preventing fatigue, organ damage, or death; and its risk which includes mild transfusion reactions, rare risk of blood borne infection, or more serious but rare reactions. I have discussed the alternatives to transfusion, including the risk and consequences of not receiving transfusion. The patient had an opportunity to ask questions and had agreed to proceed with transfusion of blood components.    Electronically signed by JANETH Graham CNP on 10/24/24 at 9:51 AM EDT

## 2024-10-24 NOTE — ACP (ADVANCE CARE PLANNING)
Advance Care Planning         The patient has appointed the following active healthcare agents:    Primary Decision Maker: Brendan Kenyon - [phone]    Secondary Decision Maker: Ade Kenyon - [phone]

## 2024-10-24 NOTE — PROGRESS NOTES
10/24/24 1039   Encounter Summary   Encounter Overview/Reason Initial Encounter;Spiritual/Emotional Needs   Service Provided For Patient   Referral/Consult From [facility]   Support System Children   Last Encounter  10/24/24  (CW)   Complexity of Encounter Moderate   Spiritual/Emotional needs   Type Spiritual Support   Assessment/Intervention/Outcome   Assessment Calm   Intervention Active listening;Discussed belief system/Buddhist practices/jay;Discussed illness injury and it’s impact;Discussed relationship with God;Explored/Affirmed feelings, thoughts, concerns;Prayer (assurance of)/[place];Read/Provided Scripture   Outcome Acceptance;Comfort;Expressed feelings, needs, and concerns;Expressed Gratitude;Peace;Receptive      visited patient. Patient was in bed. Patient appeared to be calm.  provided a listening presence and offered prayer. Patient was grateful for visit. Spiritual care is ongoing as needed.     norm Liriano [phone]

## 2024-10-24 NOTE — CARE COORDINATION
Case Management Assessment  Initial Evaluation    Date/Time of Evaluation: 10/24/2024 1:43 PM  Assessment Completed by: AILYN Hodgson    If patient is discharged prior to next notation, then this note serves as note for discharge by case management.    Patient Name: Coral Coley                   YOB: 1940  Diagnosis: Acute blood loss anemia [D62]  Dyspnea on exertion [R06.09]  Symptomatic anemia [D64.9]                   Date / Time: 10/23/2024  3:57 PM    Patient Admission Status: Inpatient   Readmission Risk (Low < 19, Mod (19-27), High > 27): Readmission Risk Score: 16.6    Current PCP: Lu Nevarez MD  PCP verified by ? Yes    Chart Reviewed: Yes      History Provided by: Patient  Patient Orientation: Alert and Oriented, Person, Place, Situation    Patient Cognition: Alert    Hospitalization in the last 30 days (Readmission):    If yes, Readmission Assessment in  Navigator will be completed.    Advance Directives:      Code Status: Full Code   Patient's Primary Decision Maker is: Legal Next of Kin    Primary Decision Maker: KostasBrendan Antonella Child - [phone]    Secondary Decision Maker: KostasAde - Other - [phone]    Discharge Planning:    Patient lives with:alone   Type of Home:  1 story with ramp    Primary Care Giver: Self    Patient Support Systems include: Children, Friends/Neighbors   Current Financial resources:    Current community resources:    Current services prior to admission:              Current DME: shower chair, ww, wc scooter, bsc             Type of Home Care services:  none     ADLS  Prior functional level: Assistance with the following:, Mobility, Other (see comment) (left BKA, use scooter,)  Current functional level: Other (see comment) (therapy ordered.)    PT AM-PAC:   /24  OT AM-PAC: 19 /24    Family can provide assistance at DC: Yes  Would you like Case Management to discuss the discharge plan with any other family members/significant  others, and if so, who? No (pt alert/oriented times 3)  Plans to Return to Present Housing: Yes  Other Identified Issues/Barriers to RETURNING to current housing:   Potential Assistance needed at discharge:              Potential DME:    Patient expects to discharge to:  home   Plan for transportation at discharge:  family or friend will transport.   Financial    Payor: [facility] MEDICARE / Plan: AN[employer] MEDIBLUE ESSENTIAL/PLUS / Product Type: *No Product type* /     Does insurance require precert for SNF:     Potential assistance Purchasing Medications:    Meds-to-Beds request: Yes      [id number] - [place], [place] - [address] -  [phone] - F [phone]  [address]  [place] [address]  Phone: [phone] Fax: [phone]      Notes:    Ss note:10/24/2024 1:44 PM Met with pt, her friend Jessica is present. Pt alert/oriented times 3, very pleasant. Resides home alone, 1 story with ramp. Hx of [facility], has all needed equipment at home, attends [facility] Outpt therapy 2 times per week, they provide transport, PCP is Dr. Lu Nevarez, pt has son and dil for support. No hx of HHC or SNF.  GI consulted, pt for scope. Plan is to return home at discharge and continue with outpt therapy. Family will transport home. No needs relayed. AILYN Wang    The Plan for Transition of Care is related to the following treatment goals of Acute blood loss anemia [D62]  Dyspnea on exertion [R06.09]  Symptomatic anemia [D64.9]

## 2024-10-24 NOTE — PROGRESS NOTES
sodium chloride 0.9 % 100 mL IVPB  100 mg IntraVENous Once    Followed by    [START ON 10/25/2024] ferric gluconate (FERRLECIT) 125 mg in sodium chloride 0.9 % 100 mL IVPB  125 mg IntraVENous Daily    polyethylene glycol  4,000 mL Oral Once    insulin lispro  0-8 Units SubCUTAneous 4x Daily AC & HS    amLODIPine  7.5 mg Oral Daily    [Held by provider] apixaban  5 mg Oral BID    [Held by provider] aspirin  81 mg Oral Daily    lisinopril  20 mg Oral Daily    [Held by provider] metFORMIN  500 mg Oral BID WC    atorvastatin  10 mg Oral Nightly    traZODone  50 mg Oral Nightly    Vitamin D  1,000 Units Oral Daily    pantoprazole (PROTONIX) 40 mg in sodium chloride (PF) 0.9 % 10 mL injection  40 mg IntraVENous Q12H     Continuous Infusions:   sodium chloride      sodium chloride      dextrose         Objective Data:  Recent Labs     10/23/24  1728 10/24/24  0642 10/24/24  1534   WBC 9.1 8.2  --    RBC 3.24* 3.36*  --    HGB 6.0* 6.9* 8.1*   HCT 21.7* 23.8* 28.2*   MCV 67.0* 70.8*  --    MCH 18.5* 20.5*  --    MCHC 27.6* 29.0*  --    RDW 18.3* 21.6*  --    *  --   --    MPV 10.3 10.7  --      Recent Labs     10/23/24  1728 10/24/24  0642    139   K 3.9 3.7    105   CO2 25 26   BUN 13 12   CREATININE 0.7 0.6   GLUCOSE 100* 122*   CALCIUM 8.9 8.4*   BILITOT  --  0.7   ALKPHOS  --  70   AST  --  14   ALT  --  7   ALBUMIN  --  3.3*     No results found for: \"TROPONINI\"     Assessment:  Symptomatic anemia  Depression on trazodone  Hyperlipidemia on simvastatin  Essential hypertension on amlodipine, lisinopril  Non-insulin dependent diabetes mellitus type II on metformin  AFIB on apixaban  CAD on ASA  History of right breast cancer  Iron deficiency  Vitamin D deficiency    Plan:   Monitor hemoglobin hematocrit  Transfuse 1 unit of packed red blood cells  IV iron replacement therapy  Gastroenterology following-possible endoscopy tomorrow on 10/25/2024  Hold anticoagulation due to anemia  Monitor vital signs  and labs    Continue current therapy.  See orders for further plan of care.    More than 50% of my  time was spent at the bedside counseling/coordinating care with the patient and/or family with face to face contact.  This time was spent reviewing notes and laboratory data as well as instructing and counseling the patient. Time I spent with the family or surrogate(s) is included only if the patient was incapable of providing the necessary information or participating in medical decisions. I also discussed the differential diagnosis and all of the proposed management plans with the patient and individuals accompanying the patient.    The patient was seen, examined and then discussed with Dr. Curiel.     JANETH Graham CNP  10/24/2024  5:06 PM        I saw and evaluated the patient. I agree with the findings and the plan of care as documented in Mekhi FOWLER-CNP note.    Brendan Curiel DO, FACOI  5:23 PM  10/24/2024

## 2024-10-24 NOTE — PROGRESS NOTES
OCCUPATIONAL THERAPY INITIAL EVALUATION    [facility]  [address] [place]. [place]        Date:10/24/2024                                                  Patient Name: Coral Coley    MRN: [id number]    : 1940    Room: [address] [place] [address]      Evaluating OT: Devora Uriarte OTR/L #[id number]     Referring Provider and Specific Provider Orders/Date:      10/24/24 0900  OT eval and treat  Start:  10/24/24 0900,   End:  10/24/24 0900,   ONE TIME,   Standing Count:  1 Occurrences,   R         Moisés, Brendan GAYTAN DO Acknowledge New      Placement Recommendation: Home with OP therapy        Diagnosis:   1. Acute blood loss anemia    2. Dyspnea on exertion         Surgery: None       Pertinent Medical History:       Past Medical History:   Diagnosis Date    Atrial fibrillation (HCC)     Below knee amputation (HCC)     Left    Cancer (HCC)     breast, right    Carotid stenosis, right     Diabetes mellitus (HCC)     Heart murmur     Hx of radiation therapy     Hyperlipidemia     Hypertension     Major depressive disorder     Osteopenia     Osteoporosis     PONV (postoperative nausea and vomiting)     Syncope     TIA (transient ischemic attack)     Transient alteration of awareness 2022         Past Surgical History:   Procedure Laterality Date    AMPUTATION Left     BKA- wears prosthesis    BREAST SURGERY Right     lumpectomy    CHOLECYSTECTOMY      COLONOSCOPY      HYSTERECTOMY (CERVIX STATUS UNKNOWN)      IR CAROTID STENT W PROTECTION  2021    IR CAROTID STENT UNI W PROTECTION 2021 [facility] SPECIAL PROCEDURES    ROTATOR CUFF REPAIR        Precautions:  Fall Risk, alarm, L BKA      Assessment of current deficits                             [x] Functional mobility  [x]ADLs  [x] Strength               []Cognition    [x] Functional transfers   [x] IADLs         [x] Safety Awareness   [x]Endurance    [] Fine Coordination              [x] Balance       [] Vision/perception   []Sensation     []Gross Motor Coordination  [] ROM  [] Delirium                   [] Motor Control     OT PLAN OF CARE   OT POC based on physician orders, patient diagnosis and results of clinical assessment    Frequency/Duration 1-3 days/wk for 2 weeks PRN     Specific OT Treatment Interventions to include:   * Instruction/training on adapted ADL techniques and AE recommendations to increase functional independence within precautions       * Training on energy conservation strategies, correct breathing pattern and techniques to improve independence/tolerance for self-care routine  * Functional transfer/mobility training/DME recommendations for increased independence, safety, and fall prevention  * Patient/Family education to increase follow through with safety techniques and functional independence  * Recommendation of environmental modifications for increased safety with functional transfers/mobility and ADLs  * Therapeutic exercise to improve motor endurance, ROM, and functional strength for ADLs/functional transfers  * Therapeutic activities to facilitate/challenge dynamic balance, stand tolerance for increased safety and independence with ADLs  * Positioning to improve skin integrity, interaction with environment and functional independence    Recommended Adaptive Equipment: TBD      Home Living: Lives alone, single family home, 1 story, Ramp to enter. Laundry on 1st floor.   Bathroom set-up: Sponge bathing only recently, patient has tub/shower         Equipment owned: shower chair, wheeled walker, wheelchairs, canes, BSC, scooter    Prior Level of Function: Modified [place] with ADLs , and most IADLs - has groceries delivered. Son lives nearby to assist if needed. Patient uses scooter for mobility and pivots independently. Pt has a prosthetic but hasn't been wearing it due to not fitting right. Pt was active with OP therapy at [facility] and they provide transportation to her appts.

## 2024-10-25 ENCOUNTER — ANESTHESIA EVENT (OUTPATIENT)
Dept: ENDOSCOPY | Age: 84
End: 2024-10-25
Payer: MEDICARE

## 2024-10-25 ENCOUNTER — ANESTHESIA (OUTPATIENT)
Dept: ENDOSCOPY | Age: 84
End: 2024-10-25
Payer: MEDICARE

## 2024-10-25 ENCOUNTER — APPOINTMENT (OUTPATIENT)
Age: 84
DRG: 377 | End: 2024-10-25
Payer: MEDICARE

## 2024-10-25 LAB
ABO/RH: NORMAL
ALBUMIN SERPL-MCNC: 3.5 G/DL (ref 3.5–5.2)
ALP SERPL-CCNC: 77 U/L (ref 35–104)
ALT SERPL-CCNC: 10 U/L (ref 0–32)
ANION GAP SERPL CALCULATED.3IONS-SCNC: 14 MMOL/L (ref 7–16)
ANTIBODY SCREEN: NEGATIVE
ARM BAND NUMBER: NORMAL
AST SERPL-CCNC: 27 U/L (ref 0–31)
BASOPHILS # BLD: 0.05 K/UL (ref 0–0.2)
BASOPHILS NFR BLD: 1 % (ref 0–2)
BILIRUB SERPL-MCNC: 0.5 MG/DL (ref 0–1.2)
BLOOD BANK BLOOD PRODUCT EXPIRATION DATE: NORMAL
BLOOD BANK BLOOD PRODUCT EXPIRATION DATE: NORMAL
BLOOD BANK DISPENSE STATUS: NORMAL
BLOOD BANK DISPENSE STATUS: NORMAL
BLOOD BANK ISBT PRODUCT BLOOD TYPE: 5100
BLOOD BANK ISBT PRODUCT BLOOD TYPE: 5100
BLOOD BANK PRODUCT CODE: NORMAL
BLOOD BANK PRODUCT CODE: NORMAL
BLOOD BANK SAMPLE EXPIRATION: NORMAL
BLOOD BANK UNIT TYPE AND RH: NORMAL
BLOOD BANK UNIT TYPE AND RH: NORMAL
BPU ID: NORMAL
BPU ID: NORMAL
BUN SERPL-MCNC: 7 MG/DL (ref 6–23)
CALCIUM SERPL-MCNC: 8.9 MG/DL (ref 8.6–10.2)
CHLORIDE SERPL-SCNC: 102 MMOL/L (ref 98–107)
CO2 SERPL-SCNC: 22 MMOL/L (ref 22–29)
COMPONENT: NORMAL
COMPONENT: NORMAL
CREAT SERPL-MCNC: 0.6 MG/DL (ref 0.5–1)
CROSSMATCH RESULT: NORMAL
CROSSMATCH RESULT: NORMAL
ECHO AO ASC DIAM: 2.8 CM
ECHO AO ASCENDING AORTA INDEX: 1.48 CM/M2
ECHO AV AREA PEAK VELOCITY: 1 CM2
ECHO AV AREA VTI: 1.1 CM2
ECHO AV AREA/BSA PEAK VELOCITY: 0.5 CM2/M2
ECHO AV AREA/BSA VTI: 0.6 CM2/M2
ECHO AV CUSP MM: 1 CM
ECHO AV MEAN GRADIENT: 29 MMHG
ECHO AV MEAN VELOCITY: 2.6 M/S
ECHO AV PEAK GRADIENT: 49 MMHG
ECHO AV PEAK VELOCITY: 3.5 M/S
ECHO AV VELOCITY RATIO: 0.31
ECHO AV VTI: 80.5 CM
ECHO BSA: 1.99 M2
ECHO EST RA PRESSURE: 3 MMHG
ECHO LA DIAMETER INDEX: 2.06 CM/M2
ECHO LA DIAMETER: 3.9 CM
ECHO LA VOL A-L A2C: 51 ML (ref 22–52)
ECHO LA VOL A-L A4C: 60 ML (ref 22–52)
ECHO LA VOL BP: 52 ML (ref 22–52)
ECHO LA VOL MOD A2C: 48 ML (ref 22–52)
ECHO LA VOL MOD A4C: 55 ML (ref 22–52)
ECHO LA VOL/BSA BIPLANE: 28 ML/M2 (ref 16–34)
ECHO LA VOLUME AREA LENGTH: 56 ML
ECHO LA VOLUME INDEX A-L A2C: 27 ML/M2 (ref 16–34)
ECHO LA VOLUME INDEX A-L A4C: 32 ML/M2 (ref 16–34)
ECHO LA VOLUME INDEX AREA LENGTH: 30 ML/M2 (ref 16–34)
ECHO LA VOLUME INDEX MOD A2C: 25 ML/M2 (ref 16–34)
ECHO LA VOLUME INDEX MOD A4C: 29 ML/M2 (ref 16–34)
ECHO LV EDV A2C: 109 ML
ECHO LV EDV A4C: 103 ML
ECHO LV EDV BP: 112 ML (ref 56–104)
ECHO LV EDV INDEX A4C: 54 ML/M2
ECHO LV EDV INDEX BP: 59 ML/M2
ECHO LV EDV NDEX A2C: 58 ML/M2
ECHO LV EJECTION FRACTION A2C: 70 %
ECHO LV EJECTION FRACTION A4C: 69 %
ECHO LV EJECTION FRACTION BIPLANE: 71 % (ref 55–100)
ECHO LV ESV A2C: 33 ML
ECHO LV ESV A4C: 32 ML
ECHO LV ESV BP: 32 ML (ref 19–49)
ECHO LV ESV INDEX A2C: 17 ML/M2
ECHO LV ESV INDEX A4C: 17 ML/M2
ECHO LV ESV INDEX BP: 17 ML/M2
ECHO LV FRACTIONAL SHORTENING: 36 % (ref 28–44)
ECHO LV INTERNAL DIMENSION DIASTOLE INDEX: 1.75 CM/M2
ECHO LV INTERNAL DIMENSION DIASTOLIC: 3.3 CM (ref 3.9–5.3)
ECHO LV INTERNAL DIMENSION SYSTOLIC INDEX: 1.11 CM/M2
ECHO LV INTERNAL DIMENSION SYSTOLIC: 2.1 CM
ECHO LV ISOVOLUMETRIC RELAXATION TIME (IVRT): 53.3 MS
ECHO LV IVSD: 2.2 CM (ref 0.6–0.9)
ECHO LV IVSS: 2.5 CM
ECHO LV MASS 2D: 209.9 G (ref 67–162)
ECHO LV MASS INDEX 2D: 111.1 G/M2 (ref 43–95)
ECHO LV POSTERIOR WALL DIASTOLIC: 1.1 CM (ref 0.6–0.9)
ECHO LV POSTERIOR WALL SYSTOLIC: 1.4 CM
ECHO LV RELATIVE WALL THICKNESS RATIO: 0.67
ECHO LVOT AREA: 3.5 CM2
ECHO LVOT AV VTI INDEX: 0.31
ECHO LVOT DIAM: 2.1 CM
ECHO LVOT MEAN GRADIENT: 3 MMHG
ECHO LVOT PEAK GRADIENT: 4 MMHG
ECHO LVOT PEAK VELOCITY: 1.1 M/S
ECHO LVOT STROKE VOLUME INDEX: 46 ML/M2
ECHO LVOT SV: 86.9 ML
ECHO LVOT VTI: 25.1 CM
ECHO MV "A" WAVE DURATION: 121.8 MSEC
ECHO MV A VELOCITY: 0.97 M/S
ECHO MV AREA PHT: 2.5 CM2
ECHO MV AREA VTI: 3.4 CM2
ECHO MV E DECELERATION TIME (DT): 380.2 MS
ECHO MV E VELOCITY: 0.85 M/S
ECHO MV E/A RATIO: 0.88
ECHO MV LVOT VTI INDEX: 1.02
ECHO MV MAX VELOCITY: 1 M/S
ECHO MV MEAN GRADIENT: 2 MMHG
ECHO MV MEAN VELOCITY: 0.7 M/S
ECHO MV PEAK GRADIENT: 4 MMHG
ECHO MV PRESSURE HALF TIME (PHT): 87 MS
ECHO MV VTI: 25.5 CM
ECHO PV MAX VELOCITY: 1.2 M/S
ECHO PV MEAN GRADIENT: 3 MMHG
ECHO PV MEAN VELOCITY: 0.9 M/S
ECHO PV PEAK GRADIENT: 5 MMHG
ECHO PV VTI: 28.5 CM
ECHO PVEIN A DURATION: 148.4 MS
ECHO PVEIN A VELOCITY: 0.3 M/S
ECHO PVEIN PEAK D VELOCITY: 0.4 M/S
ECHO PVEIN PEAK S VELOCITY: 0.5 M/S
ECHO PVEIN S/D RATIO: 1.3
ECHO RIGHT VENTRICULAR SYSTOLIC PRESSURE (RVSP): 9 MMHG
ECHO RV INTERNAL DIMENSION: 2.9 CM
ECHO RV LONGITUDINAL DIMENSION: 5.5 CM
ECHO RV MID DIMENSION: 2.7 CM
ECHO TV REGURGITANT MAX VELOCITY: 1.23 M/S
ECHO TV REGURGITANT PEAK GRADIENT: 6 MMHG
EOSINOPHIL # BLD: 0.26 K/UL (ref 0.05–0.5)
EOSINOPHILS RELATIVE PERCENT: 3 % (ref 0–6)
ERYTHROCYTE [DISTWIDTH] IN BLOOD BY AUTOMATED COUNT: 22 % (ref 11.5–15)
GFR, ESTIMATED: 89 ML/MIN/1.73M2
GLUCOSE BLD-MCNC: 134 MG/DL (ref 74–99)
GLUCOSE BLD-MCNC: 149 MG/DL (ref 74–99)
GLUCOSE BLD-MCNC: 265 MG/DL (ref 74–99)
GLUCOSE SERPL-MCNC: 122 MG/DL (ref 74–99)
HCT VFR BLD AUTO: 30.6 % (ref 34–48)
HCT VFR BLD AUTO: 32.6 % (ref 34–48)
HGB BLD-MCNC: 9 G/DL (ref 11.5–15.5)
HGB BLD-MCNC: 9.3 G/DL (ref 11.5–15.5)
IMM GRANULOCYTES # BLD AUTO: 0.06 K/UL (ref 0–0.58)
IMM GRANULOCYTES NFR BLD: 1 % (ref 0–5)
LYMPHOCYTES NFR BLD: 1.75 K/UL (ref 1.5–4)
LYMPHOCYTES RELATIVE PERCENT: 19 % (ref 20–42)
MAGNESIUM SERPL-MCNC: 2 MG/DL (ref 1.6–2.6)
MCH RBC QN AUTO: 21 PG (ref 26–35)
MCHC RBC AUTO-ENTMCNC: 28.5 G/DL (ref 32–34.5)
MCV RBC AUTO: 73.6 FL (ref 80–99.9)
MONOCYTES NFR BLD: 0.82 K/UL (ref 0.1–0.95)
MONOCYTES NFR BLD: 9 % (ref 2–12)
NEUTROPHILS NFR BLD: 69 % (ref 43–80)
NEUTS SEG NFR BLD: 6.35 K/UL (ref 1.8–7.3)
PHOSPHATE SERPL-MCNC: 3.2 MG/DL (ref 2.5–4.5)
PLATELET # BLD AUTO: 348 K/UL (ref 130–450)
PLATELET CONFIRMATION: NORMAL
PMV BLD AUTO: 10.9 FL (ref 7–12)
POTASSIUM SERPL-SCNC: 3.9 MMOL/L (ref 3.5–5)
PROT SERPL-MCNC: 6.9 G/DL (ref 6.4–8.3)
RBC # BLD AUTO: 4.43 M/UL (ref 3.5–5.5)
RBC # BLD: ABNORMAL 10*6/UL
SODIUM SERPL-SCNC: 138 MMOL/L (ref 132–146)
TRANSFUSION STATUS: NORMAL
TRANSFUSION STATUS: NORMAL
UNIT DIVISION: 0
UNIT DIVISION: 0
UNIT ISSUE DATE/TIME: NORMAL
UNIT ISSUE DATE/TIME: NORMAL
WBC OTHER # BLD: 9.3 K/UL (ref 4.5–11.5)

## 2024-10-25 PROCEDURE — 3609009900 HC COLONOSCOPY W/CONTROL BLEEDING ANY METHOD: Performed by: INTERNAL MEDICINE

## 2024-10-25 PROCEDURE — 6360000002 HC RX W HCPCS: Performed by: NURSE ANESTHETIST, CERTIFIED REGISTERED

## 2024-10-25 PROCEDURE — 2500000003 HC RX 250 WO HCPCS: Performed by: NURSE ANESTHETIST, CERTIFIED REGISTERED

## 2024-10-25 PROCEDURE — 2709999900 HC NON-CHARGEABLE SUPPLY: Performed by: INTERNAL MEDICINE

## 2024-10-25 PROCEDURE — 6370000000 HC RX 637 (ALT 250 FOR IP)

## 2024-10-25 PROCEDURE — 93306 TTE W/DOPPLER COMPLETE: CPT

## 2024-10-25 PROCEDURE — 36415 COLL VENOUS BLD VENIPUNCTURE: CPT

## 2024-10-25 PROCEDURE — 80053 COMPREHEN METABOLIC PANEL: CPT

## 2024-10-25 PROCEDURE — 3700000000 HC ANESTHESIA ATTENDED CARE: Performed by: INTERNAL MEDICINE

## 2024-10-25 PROCEDURE — 2580000003 HC RX 258

## 2024-10-25 PROCEDURE — 1200000000 HC SEMI PRIVATE

## 2024-10-25 PROCEDURE — 3700000001 HC ADD 15 MINUTES (ANESTHESIA): Performed by: INTERNAL MEDICINE

## 2024-10-25 PROCEDURE — 85018 HEMOGLOBIN: CPT

## 2024-10-25 PROCEDURE — 7100000010 HC PHASE II RECOVERY - FIRST 15 MIN: Performed by: INTERNAL MEDICINE

## 2024-10-25 PROCEDURE — 0DB68ZX EXCISION OF STOMACH, VIA NATURAL OR ARTIFICIAL OPENING ENDOSCOPIC, DIAGNOSTIC: ICD-10-PCS | Performed by: INTERNAL MEDICINE

## 2024-10-25 PROCEDURE — 0W3P8ZZ CONTROL BLEEDING IN GASTROINTESTINAL TRACT, VIA NATURAL OR ARTIFICIAL OPENING ENDOSCOPIC: ICD-10-PCS | Performed by: INTERNAL MEDICINE

## 2024-10-25 PROCEDURE — 83036 HEMOGLOBIN GLYCOSYLATED A1C: CPT

## 2024-10-25 PROCEDURE — 2580000003 HC RX 258: Performed by: NURSE ANESTHETIST, CERTIFIED REGISTERED

## 2024-10-25 PROCEDURE — 7100000011 HC PHASE II RECOVERY - ADDTL 15 MIN: Performed by: INTERNAL MEDICINE

## 2024-10-25 PROCEDURE — 2720000010 HC SURG SUPPLY STERILE: Performed by: INTERNAL MEDICINE

## 2024-10-25 PROCEDURE — 85025 COMPLETE CBC W/AUTO DIFF WBC: CPT

## 2024-10-25 PROCEDURE — 85014 HEMATOCRIT: CPT

## 2024-10-25 PROCEDURE — 6360000002 HC RX W HCPCS

## 2024-10-25 PROCEDURE — 3609012400 HC EGD TRANSORAL BIOPSY SINGLE/MULTIPLE: Performed by: INTERNAL MEDICINE

## 2024-10-25 PROCEDURE — 6370000000 HC RX 637 (ALT 250 FOR IP): Performed by: INTERNAL MEDICINE

## 2024-10-25 PROCEDURE — 82962 GLUCOSE BLOOD TEST: CPT

## 2024-10-25 PROCEDURE — 88305 TISSUE EXAM BY PATHOLOGIST: CPT

## 2024-10-25 PROCEDURE — 84100 ASSAY OF PHOSPHORUS: CPT

## 2024-10-25 PROCEDURE — 83735 ASSAY OF MAGNESIUM: CPT

## 2024-10-25 RX ORDER — LIDOCAINE HYDROCHLORIDE 20 MG/ML
INJECTION, SOLUTION INFILTRATION; PERINEURAL
Status: DISCONTINUED | OUTPATIENT
Start: 2024-10-25 | End: 2024-10-25 | Stop reason: SDUPTHER

## 2024-10-25 RX ORDER — SODIUM CHLORIDE 9 MG/ML
INJECTION, SOLUTION INTRAVENOUS
Status: DISCONTINUED | OUTPATIENT
Start: 2024-10-25 | End: 2024-10-25 | Stop reason: SDUPTHER

## 2024-10-25 RX ORDER — FAMOTIDINE 20 MG/1
20 TABLET, FILM COATED ORAL DAILY
Status: DISCONTINUED | OUTPATIENT
Start: 2024-10-25 | End: 2024-10-26 | Stop reason: HOSPADM

## 2024-10-25 RX ORDER — PROPOFOL 10 MG/ML
INJECTION, EMULSION INTRAVENOUS
Status: DISCONTINUED | OUTPATIENT
Start: 2024-10-25 | End: 2024-10-25 | Stop reason: SDUPTHER

## 2024-10-25 RX ORDER — FENTANYL CITRATE 50 UG/ML
INJECTION, SOLUTION INTRAMUSCULAR; INTRAVENOUS
Status: DISCONTINUED | OUTPATIENT
Start: 2024-10-25 | End: 2024-10-25 | Stop reason: SDUPTHER

## 2024-10-25 RX ORDER — CYANOCOBALAMIN 1000 UG/ML
1000 INJECTION, SOLUTION INTRAMUSCULAR; SUBCUTANEOUS ONCE
Status: COMPLETED | OUTPATIENT
Start: 2024-10-25 | End: 2024-10-25

## 2024-10-25 RX ORDER — EPHEDRINE SULFATE 50 MG/ML
INJECTION INTRAVENOUS
Status: DISCONTINUED | OUTPATIENT
Start: 2024-10-25 | End: 2024-10-25 | Stop reason: SDUPTHER

## 2024-10-25 RX ADMIN — SODIUM CHLORIDE 125 MG: 9 INJECTION, SOLUTION INTRAVENOUS at 10:43

## 2024-10-25 RX ADMIN — FAMOTIDINE 20 MG: 20 TABLET, FILM COATED ORAL at 17:34

## 2024-10-25 RX ADMIN — LIDOCAINE HYDROCHLORIDE 100 MG: 20 INJECTION, SOLUTION INFILTRATION; PERINEURAL at 13:22

## 2024-10-25 RX ADMIN — PROPOFOL 100 MCG/KG/MIN: 10 INJECTION, EMULSION INTRAVENOUS at 13:23

## 2024-10-25 RX ADMIN — EPHEDRINE SULFATE 7.5 MG: 50 INJECTION INTRAVENOUS at 13:32

## 2024-10-25 RX ADMIN — TRAZODONE HYDROCHLORIDE 50 MG: 50 TABLET ORAL at 20:53

## 2024-10-25 RX ADMIN — PANTOPRAZOLE SODIUM 40 MG: 40 INJECTION, POWDER, FOR SOLUTION INTRAVENOUS at 10:38

## 2024-10-25 RX ADMIN — CYANOCOBALAMIN 1000 MCG: 1000 INJECTION, SOLUTION INTRAMUSCULAR; SUBCUTANEOUS at 10:46

## 2024-10-25 RX ADMIN — EPHEDRINE SULFATE 2.5 MG: 50 INJECTION INTRAVENOUS at 13:51

## 2024-10-25 RX ADMIN — LISINOPRIL 20 MG: 20 TABLET ORAL at 10:51

## 2024-10-25 RX ADMIN — SODIUM CHLORIDE: 9 INJECTION, SOLUTION INTRAVENOUS at 13:12

## 2024-10-25 RX ADMIN — ATORVASTATIN CALCIUM 10 MG: 10 TABLET, FILM COATED ORAL at 20:53

## 2024-10-25 RX ADMIN — PHENYLEPHRINE HYDROCHLORIDE 50 MCG: 10 INJECTION INTRAVENOUS at 14:02

## 2024-10-25 RX ADMIN — Medication 1000 UNITS: at 10:51

## 2024-10-25 RX ADMIN — FENTANYL CITRATE 50 MCG: 50 INJECTION, SOLUTION INTRAMUSCULAR; INTRAVENOUS at 13:22

## 2024-10-25 RX ADMIN — PROPOFOL 100 MG: 10 INJECTION, EMULSION INTRAVENOUS at 13:22

## 2024-10-25 RX ADMIN — AMLODIPINE BESYLATE 7.5 MG: 5 TABLET ORAL at 10:50

## 2024-10-25 ASSESSMENT — PAIN SCALES - GENERAL
PAINLEVEL_OUTOF10: 0
PAINLEVEL_OUTOF10: 0

## 2024-10-25 NOTE — OP NOTE
Operative Note      Patient: Coral Coley  YOB: 1940  MRN: [id number]    Date of Procedure: 10/25/2024    Pre-Op Diagnosis Codes:      * Acute anemia [D64.9], Hematochezia    Post-Op Diagnosis: SAME       Procedure(s):  ESOPHAGOGASTRODUODENOSCOPY  COLONOSCOPY    Surgeon(s):  Tylor Jaime DO    Assistant:   Surgical Assistant: Taty Davis RN    Anesthesia: Monitor Anesthesia Care    Estimated Blood Loss (mL): < 5cc    Complications: None    Specimens:   * No specimens in log *    Implants:  * No implants in log *      Drains: * No LDAs found *    Detailed Description of Procedure:   Procedure:  Esophagogastroduodenoscopy    Indication:  Anemia, Hematochezia    Consent: Informed consent was obtained from the patient including and not limited to risk of perforation, aspiration of gastric contents or teeth, bleeding, infection, dental breakage, ileus, need for surgery, or worst case death.    Sedation  MAC    Estimated Blood Loss -- < 5cc    Endoscope was advanced easily through mouth to second portion of duodenum      Oropharynx views are limited but grossly normal.    Esophagus:   Mucosa is normal other than LA A Reflux Esophagitis with biopsy.  GEJ at ~37 cm.      Stomach:   Antrum with mild gastritis with biopsy    Gastric body is normal.    Retroflexed views showed normal fundus and cardia with a small 1-2cm hiatal hernia with no juaquin lesions.    Duodenum: Bulb is normal.    Second portion of duodenum is normal.  No fresh of old blood.  No AVM seen.    IMPRESSION AND PLAN:     1.  LA A Reflux Esophagitis with biopsy     2.  1-2cm hiatal hernia with no juaquin lesions    3.  Mild gastritis with biopsy    4.  Normal duodenum to D2 with no AVM seen    5.  Pepcid 20mg in am.  No NSAID's.  Biopsies pending.  NPO 4 hours prior to laying down.  No source of GI bleed or anemia seen on exam.  See colonoscopy report same day.      Follow up as outpatient in office, call [phone] to schedule

## 2024-10-25 NOTE — CARE COORDINATION
Ss note:10/25/977824:03 PM Pt presents from home alone. PTA pt was attending outpt therapy at [place]. WILL NEED NEW PAPER SCRIPT for outpt therapy at [facility] post discharge, charge nurse aware. Pt for scope today at 1:55 pm. Pt is left BKA, has all needed equipment at home has good support from family and friends. No hx of HHC or SNF. Plan is to return home, will have transport, will need the outpt paper script. AILYN Wang

## 2024-10-25 NOTE — PROGRESS NOTES
Name:  Coral Coley  :  1940  MRN:  [id number]  Room:  [facility]3/0433-01  DOS:  10/25/2024    [facility]  Dr. Maureen Lee M.D.  Dr. Pk Hills M.D.  Dr. Tylor Jaime D.O.  Dr. Mohit Harris M.D.  Dr. Deshaun Velásquez D.O.    -NP Progress Note-    PCP:  Lu eNvarez MD  Admitting Physician:  Brendan Curiel DO  Chief Complaint:    Chief Complaint   Patient presents with    Shortness of Breath     Pt complaint of shortness of breath. States she feels more SOB upon exertion.        Subjective  Patient sitting up in bed.  Family present.  Tolerated prep.  Stools clear.    Physical Examination  Vitals:  BP (!) 140/67   Pulse 86   Temp 98 °F (36.7 °C) (Oral)   Resp 18   Ht 1.549 m (5' 1\")   Wt 90.5 kg (199 lb 9.6 oz)   SpO2 95%   BMI 37.71 kg/m²   General Appearance:  awake, alert, and oriented to person, place, time, and purpose; appears stated age and cooperative; no apparent distress no labored breathing  HEENT:  PERRL; EOMI; sclera clear; buccal mucosa moist  Neck:  supple; trachea midline; no thyromegaly; no JVD; no bruits  Heart:  rhythm regular; rate controlled; no murmurs  Lungs:  symmetrical; clear to auscultation bilaterally; no wheezes; no rhonchi; no rales  Abdomen:  soft, non-tender, non-distended; bowel sounds positive; no organomegaly or masses; no pain on palpation  Extremities:  peripheral pulses present; no peripheral edema; no ulcers  Neurologic:  alert and oriented x 3; no focal deficit; cranial nerves grossly intact  Skin:  no petechia; no hemorrhage; no wounds    Medications  Scheduled Meds    cyanocobalamin  1,000 mcg IntraMUSCular Once    ferric gluconate (FERRLECIT) 125 mg in sodium chloride 0.9 % 100 mL IVPB  125 mg IntraVENous Daily    insulin lispro  0-8 Units SubCUTAneous 4x Daily AC & HS    amLODIPine  7.5 mg Oral Daily    [Held by provider] apixaban  5 mg Oral BID    [Held by provider] aspirin  81 mg Oral Daily    lisinopril

## 2024-10-25 NOTE — PROGRESS NOTES
Immediately prior to the procedure the patient's History and Physical was reviewed- there are no changes with the current vitals.  BP (!) 140/67   Pulse 86   Temp 98 °F (36.7 °C) (Oral)   Resp 18   Ht 1.549 m (5' 1\")   Wt 90.5 kg (199 lb 9.6 oz)   SpO2 95%   BMI 37.71 kg/m²     No CP/SOB.  Risks/benefits d/w pt.  All questions answered.  Proceed with EGD and Colonoscopy.    ROMULO RAMIREZ DO  10/25/2024  1:18 PM

## 2024-10-25 NOTE — OP NOTE
Operative Note      Patient: Coral Coley  YOB: 1940  MRN: [id number]    Date of Procedure: 10/25/2024    Pre-Op Diagnosis Codes:      * Acute anemia [D64.9], Hematochezia, H/O Colon Polyp > 10 yrs ago    Post-Op Diagnosis:  SAME       Procedure(s):  ESOPHAGOGASTRODUODENOSCOPY  COLONOSCOPY    Surgeon(s):  Tylor Jaime DO    Assistant:   Surgical Assistant: Taty Davis RN    Anesthesia: Monitor Anesthesia Care    Estimated Blood Loss (mL): < 5cc    Complications: None    Specimens:   * No specimens in log *    Implants:  * No implants in log *      Drains: * No LDAs found *    Detailed Description of Procedure:   Colonoscopy note    Indication: Hematochezia, Anemia, H/O Colon Polyps    Consent: Informed consent was obtained from the patient including and not limited to risk of perforation, bleeding, infection, dental breakage, ileus, need for surgery, or worst case death.    Sedation  MAC    Estimated Blood Loss -- < 5cc    Endoscope was advanced through anus to cecum and terminal ileum.    The ileocecal valve and appendiceal orifice were identified and pictures were taken.      Preparation is good.  Patient tolerated procedure well.    No fresh or old blood seen on exam    Terminal ileum x5cm is normal  Cecum is normal other than a small 2mm AVM treated with APC on Right colon setting with good coagulation and hemostasis  Ascending colon is normal other than a 4mm AVM treated with APC on Right colon setting with good coagulation and hemostasis  Transverse colon is normal  Descending colon is normal  Sigmoid colon with moderate diverticulosis with no bleeding stigmata  Rectum direct views are normal  Retroflexion in rectum shows normal mucosa and dentate line with grade 2 IH's and EH's    IMPRESSION AND PLAN:   1.  2 AVM's right colon treated with APC on Right colon setting with good coagulation and hemostasis     2.  Moderate diverticulosis of sigmoid colon with no bleeding stigmata    3.

## 2024-10-25 NOTE — PLAN OF CARE
Problem: Chronic Conditions and Co-morbidities  Goal: Patient's chronic conditions and co-morbidity symptoms are monitored and maintained or improved  10/25/2024 0104 by Soo Oliveros RN  Outcome: Progressing     Problem: Safety - Adult  Goal: Free from fall injury  10/25/2024 0104 by Soo Oliveros, RN  Outcome: Progressing     Problem: Skin/Tissue Integrity  Goal: Absence of new skin breakdown  Description: 1.  Monitor for areas of redness and/or skin breakdown  2.  Assess vascular access sites hourly  3.  Every 4-6 hours minimum:  Change oxygen saturation probe site  4.  Every 4-6 hours:  If on nasal continuous positive airway pressure, respiratory therapy assess nares and determine need for appliance change or resting period.  10/25/2024 0104 by Soo Oliveros RN  Outcome: Progressing

## 2024-10-25 NOTE — ANESTHESIA PRE PROCEDURE
Cardiovascular:    (+) hypertension:, dysrhythmias: atrial fibrillation, hyperlipidemia      ECG reviewed  Rhythm: regular  Rate: normal                    Neuro/Psych:   (+) TIA, psychiatric history (major depressive disorder):            GI/Hepatic/Renal:   (+) bowel prep     (-) hiatal hernia, GERD, PUD, hepatitis, liver disease, no renal disease and no morbid obesity       Endo/Other:    (+) DiabetesType II DM, blood dyscrasia (hgb 9.3 hct 32.6): anemia and anticoagulation therapy:., malignancy/cancer (breast cancer radiation 20 years ago).                 Abdominal: normal exam            Vascular: negative vascular ROS.         Other Findings:         Anesthesia Plan      MAC     ASA 3       Induction: intravenous.      Anesthetic plan and risks discussed with patient.      Plan discussed with attending.              Chart Review:  Chart reviewed on October 25, 2024 at 1:07 PM by JANETH Lynn CRNA.  Above represents information available via shared medical record including previous anesthesia history, drug and allergy history.  This does include physical examination of patient.  Anesthetic plan risks and benefits discussed with patient. Patient verbalizes understanding and degrees to proceed. JANETH Lynn CRNA, APRN - CRNA   10/25/2024

## 2024-10-25 NOTE — ANESTHESIA POSTPROCEDURE EVALUATION
Department of Anesthesiology  Postprocedure Note    Patient: Coral Coley  MRN: [id number]  YOB: 1940  Date of evaluation: 10/25/2024    Procedure Summary       Date: 10/25/24 Room / Location: [address]    Anesthesia Start: 1312 Anesthesia Stop: 1407    Procedures:       ESOPHAGOGASTRODUODENOSCOPY BIOPSY      COLONOSCOPY CONTROL HEMORRHAGE Diagnosis:       Acute anemia      (Acute anemia [D64.9])    Surgeons: Tylor Jaime DO Responsible Provider: Boo Celestin MD    Anesthesia Type: MAC ASA Status: 3            Anesthesia Type: No value filed.    Mark Phase I:      Mark Phase II: Mark Score: 10    Anesthesia Post Evaluation    Patient location during evaluation: bedside  Patient participation: complete - patient participated  Level of consciousness: awake  Pain score: 0  Nausea & Vomiting: no vomiting and no nausea  Cardiovascular status: hemodynamically stable  Respiratory status: acceptable  Hydration status: stable  Pain management: adequate        No notable events documented.

## 2024-10-25 NOTE — PROGRESS NOTES
Internal Medicine Consult Note    [facility]    Brendan Curiel D.O., ABIGAILOAshleyI.                    Manny Preston D.O., LOVEI.                             Timothy Yen D.O.     Sarah Mendiola, MSN, APRN, NP-C  Mitchell Vigil, MSN, APRN-CNP  Mekhi Salcido, MSN, APRN-CNP  Malaika Rodriguez, MSN APRN-CNP  Nisreen Pal, MSN. APRN-NP-C     Primary Care Physician: Lu Nevarez MD   Admitting Physician:  Brendan Curiel DO  Admission date and time: 10/23/2024  3:57 PM    Room:  [facility] ROOM/NONE  Admitting diagnosis: Acute blood loss anemia [D62]  Dyspnea on exertion [R06.09]  Symptomatic anemia [D64.9]    Patient Name: Coral Coley  MRN: [id number]    Date of Service: 10/25/2024     Subjective:  Coral is a 83 y.o. female who was seen and examined today,10/25/2024, at the bedside.  Patient seen in the echocardiogram department today.  Patient scheduled for upper lobe panendoscopy today.  Patient is anxious for discharge.  Patient tolerating iron infusion and hemoglobin has improved since transfusion.  She denies chest pain or shortness of breath.  Discussed discharge planning    Daughter was present during my examination.    Review of System:   Constitutional:   Denies fever or chills, weight loss or gain, positive fatigue and general malaise  HEENT:   Denies ear pain, sore throat, sinus or eye problems.  Cardiovascular:   Denies any chest pain, irregular heartbeats, or palpitations.   Respiratory:   Denies shortness of breath, coughing, sputum production, hemoptysis, or wheezing.  Gastrointestinal:   Denies nausea, vomiting, diarrhea, or constipation.  Denies any abdominal pain.  Genitourinary:    Denies any urgency, frequency, hematuria. Voiding  without difficulty.  Extremities:   Denies lower extremity swelling, edema or cyanosis.  Below the knee amputation on the left  Neurology:    Denies any headache or focal neurological deficits, Denies generalized weakness or memory  replacement  Echocardiogram for LV function  Endoscopy planned for today  Discharge soon  Hepatomegaly by CT with evidence of diverticulosis    Continue current therapy.  See orders for further plan of care.    More than 50% of my  time was spent at the bedside counseling/coordinating care with the patient and/or family with face to face contact.  This time was spent reviewing notes and laboratory data as well as instructing and counseling the patient. Time I spent with the family or surrogate(s) is included only if the patient was incapable of providing the necessary information or participating in medical decisions. I also discussed the differential diagnosis and all of the proposed management plans with the patient and individuals accompanying the patient.        Brendan Curiel DO, MARCOSOI  1:29 PM  10/25/2024

## 2024-10-26 VITALS
SYSTOLIC BLOOD PRESSURE: 115 MMHG | OXYGEN SATURATION: 96 % | WEIGHT: 199.6 LBS | HEART RATE: 81 BPM | HEIGHT: 61 IN | DIASTOLIC BLOOD PRESSURE: 54 MMHG | BODY MASS INDEX: 37.69 KG/M2 | RESPIRATION RATE: 22 BRPM | TEMPERATURE: 97.8 F

## 2024-10-26 LAB
ALBUMIN SERPL-MCNC: 3.2 G/DL (ref 3.5–5.2)
ALP SERPL-CCNC: 66 U/L (ref 35–104)
ALT SERPL-CCNC: 8 U/L (ref 0–32)
ANION GAP SERPL CALCULATED.3IONS-SCNC: 11 MMOL/L (ref 7–16)
AST SERPL-CCNC: 18 U/L (ref 0–31)
BASOPHILS # BLD: 0 K/UL (ref 0–0.2)
BASOPHILS NFR BLD: 0 % (ref 0–2)
BILIRUB SERPL-MCNC: 0.3 MG/DL (ref 0–1.2)
BUN SERPL-MCNC: 11 MG/DL (ref 6–23)
CALCIUM SERPL-MCNC: 8.6 MG/DL (ref 8.6–10.2)
CHLORIDE SERPL-SCNC: 103 MMOL/L (ref 98–107)
CO2 SERPL-SCNC: 25 MMOL/L (ref 22–29)
CREAT SERPL-MCNC: 0.7 MG/DL (ref 0.5–1)
EOSINOPHIL # BLD: 0.28 K/UL (ref 0.05–0.5)
EOSINOPHILS RELATIVE PERCENT: 3 % (ref 0–6)
ERYTHROCYTE [DISTWIDTH] IN BLOOD BY AUTOMATED COUNT: 22.9 % (ref 11.5–15)
GFR, ESTIMATED: 85 ML/MIN/1.73M2
GLUCOSE BLD-MCNC: 125 MG/DL (ref 74–99)
GLUCOSE BLD-MCNC: 178 MG/DL (ref 74–99)
GLUCOSE SERPL-MCNC: 119 MG/DL (ref 74–99)
HBA1C MFR BLD: 6 % (ref 4–5.6)
HCT VFR BLD AUTO: 26.9 % (ref 34–48)
HGB BLD-MCNC: 8 G/DL (ref 11.5–15.5)
LYMPHOCYTES NFR BLD: 2.05 K/UL (ref 1.5–4)
LYMPHOCYTES RELATIVE PERCENT: 19 % (ref 20–42)
MAGNESIUM SERPL-MCNC: 1.9 MG/DL (ref 1.6–2.6)
MCH RBC QN AUTO: 21.7 PG (ref 26–35)
MCHC RBC AUTO-ENTMCNC: 29.7 G/DL (ref 32–34.5)
MCV RBC AUTO: 73.1 FL (ref 80–99.9)
MONOCYTES NFR BLD: 0.19 K/UL (ref 0.1–0.95)
MONOCYTES NFR BLD: 2 % (ref 2–12)
NEUTROPHILS NFR BLD: 77 % (ref 43–80)
NEUTS SEG NFR BLD: 8.19 K/UL (ref 1.8–7.3)
PHOSPHATE SERPL-MCNC: 4.2 MG/DL (ref 2.5–4.5)
PLATELET # BLD AUTO: 415 K/UL (ref 130–450)
PMV BLD AUTO: 10.4 FL (ref 7–12)
POTASSIUM SERPL-SCNC: 3.4 MMOL/L (ref 3.5–5)
PROT SERPL-MCNC: 6.1 G/DL (ref 6.4–8.3)
RBC # BLD AUTO: 3.68 M/UL (ref 3.5–5.5)
RBC # BLD: ABNORMAL 10*6/UL
SODIUM SERPL-SCNC: 139 MMOL/L (ref 132–146)
WBC OTHER # BLD: 10.7 K/UL (ref 4.5–11.5)

## 2024-10-26 PROCEDURE — 6370000000 HC RX 637 (ALT 250 FOR IP): Performed by: INTERNAL MEDICINE

## 2024-10-26 PROCEDURE — 2580000003 HC RX 258

## 2024-10-26 PROCEDURE — 6370000000 HC RX 637 (ALT 250 FOR IP)

## 2024-10-26 PROCEDURE — 36415 COLL VENOUS BLD VENIPUNCTURE: CPT

## 2024-10-26 PROCEDURE — 82962 GLUCOSE BLOOD TEST: CPT

## 2024-10-26 PROCEDURE — 80053 COMPREHEN METABOLIC PANEL: CPT

## 2024-10-26 PROCEDURE — 6360000002 HC RX W HCPCS

## 2024-10-26 PROCEDURE — 76937 US GUIDE VASCULAR ACCESS: CPT

## 2024-10-26 PROCEDURE — 84100 ASSAY OF PHOSPHORUS: CPT

## 2024-10-26 PROCEDURE — 85025 COMPLETE CBC W/AUTO DIFF WBC: CPT

## 2024-10-26 PROCEDURE — 83735 ASSAY OF MAGNESIUM: CPT

## 2024-10-26 RX ORDER — FAMOTIDINE 20 MG/1
20 TABLET, FILM COATED ORAL DAILY
Qty: 60 TABLET | Refills: 0 | Status: SHIPPED | OUTPATIENT
Start: 2024-10-27

## 2024-10-26 RX ADMIN — SODIUM CHLORIDE 125 MG: 9 INJECTION, SOLUTION INTRAVENOUS at 11:12

## 2024-10-26 RX ADMIN — AMLODIPINE BESYLATE 7.5 MG: 5 TABLET ORAL at 08:52

## 2024-10-26 RX ADMIN — FAMOTIDINE 20 MG: 20 TABLET, FILM COATED ORAL at 08:52

## 2024-10-26 RX ADMIN — POTASSIUM CHLORIDE 40 MEQ: 1500 TABLET, EXTENDED RELEASE ORAL at 09:00

## 2024-10-26 RX ADMIN — Medication 1000 UNITS: at 08:52

## 2024-10-26 RX ADMIN — LISINOPRIL 20 MG: 20 TABLET ORAL at 08:52

## 2024-10-26 NOTE — DISCHARGE INSTRUCTIONS
Your information:  Name: Coral Coley  : 1940    Your instructions:    IF YOU EXPERIENCE ANY OF THE FOLLOWING SYMPTOMS, CHEST PAIN, SHORTNESS OF BREATH, COUGHING UP BLOOD OR BLOODY SPUTUM, STOMACH PAIN OR CRAMPING, DARK, TARRY STOOLS, LOSS OF APPETITE, GENERAL NOT FEELING WELL, SIGNS AND SYMPTOMS OF INFECTION LIKE FEVER AND OR CHILLS, PLEASE CALL YOUR FAMILY DOCTOR OR RETURN TO THE EMERGENCY ROOM.     What to do after you leave the hospital:    Recommended diet: {diet:30730}    Recommended activity: {discharge activity:81607}        The following personal items were collected during your admission and were returned to you:    Belongings  Dental Appliances: Partials, Uppers  Vision - Corrective Lenses: Eyeglasses  Hearing Aid: Left hearing aid, Right hearing aid  Clothing: Sent home  Jewelry: Ring (x2)  Body Piercings Removed: No  Electronic Devices: Cell Phone,   Weapons (Notify Protective Services/Security): None  Other Valuables: At home, Sent home  Home Medications: None  Valuables Given To: Family (Comment)  Provide Name(s) of Who Valuable(s) Were Given To: daughter  Patient approves for provider to speak to responsible person post operatively: Yes    Information obtained by:  By signing below, I understand that if any problems occur once I leave the hospital I am to contact ***.  I understand and acknowledge receipt of the instructions indicated above.

## 2024-10-26 NOTE — PROGRESS NOTES
Name:  Coral Coley  :  1940  MRN:  [id number]  Room:  [facility]3/0433-01  DOS:  10/26/2024    [facility]  [facility]  Dr. Maureen Lee M.D.  Dr. Pk Hills M.D.  Dr. Tylor Jaime D.O.  Dr. Mohit Harris M.D.  Dr. Deshaun Velásquez D.O.    -NP Progress Note-    PCP:  Lu Nevarez MD  Admitting Physician:  Brendan Curiel DO  Chief Complaint:    Chief Complaint   Patient presents with    Shortness of Breath     Pt complaint of shortness of breath. States she feels more SOB upon exertion.        Subjective  Patient resting in bed.  Denies abdominal pain, nausea, vomiting.  Tolerated dinner last night.    Physical Examination  Vitals:  BP (!) 115/54   Pulse 81   Temp 97.8 °F (36.6 °C) (Oral)   Resp 22   Ht 1.549 m (5' 1\")   Wt 90.5 kg (199 lb 9.6 oz)   SpO2 96%   BMI 37.71 kg/m²   General Appearance:  awake, alert, and oriented to person, place, time, and purpose; appears stated age and cooperative; no apparent distress no labored breathing  HEENT:  PERRL; EOMI; sclera clear; buccal mucosa moist  Neck:  supple; trachea midline; no thyromegaly; no JVD; no bruits  Heart:  rhythm regular; rate controlled; no murmurs  Lungs:  symmetrical; clear to auscultation bilaterally; no wheezes; no rhonchi; no rales  Abdomen:  soft, non-tender, non-distended; bowel sounds positive; no organomegaly or masses; no pain on palpation  Extremities:  peripheral pulses present; no peripheral edema; no ulcers  Neurologic:  alert and oriented x 3; no focal deficit; cranial nerves grossly intact  Skin:  no petechia; no hemorrhage; no wounds    Medications  Scheduled Meds    famotidine  20 mg Oral Daily    ferric gluconate (FERRLECIT) 125 mg in sodium chloride 0.9 % 100 mL IVPB  125 mg IntraVENous Daily    insulin lispro  0-8 Units SubCUTAneous 4x Daily AC & HS    amLODIPine  7.5 mg Oral Daily    [Held by provider] apixaban  5 mg Oral BID    [Held by provider] aspirin  81 mg Oral Daily     minimal pulmonary venous hypertension.         Assessment and Plan  Patient is a 83 y.o. female patient on consult for GI bleed and anemia.    1.  GI bleed/anemia  -Acute on chronic  -Reported hematochezia  -Cannot rule out brisk upper GI bleed  -Obtaining of iron studies given transfusion will be of limited clinical value  -Monitor serial hemoglobin  -Keep PRBCs on hold  -Defer transfusion to admitting  -IV PPI  -EGD 10/25/2024 by Dr. Jaime with findings of grade a esophagitis, 1 to 2 cm hiatal hernia, mild gastritis, unremarkable examined proximal small bowel  -Colonoscopy 10/25/2024 by Dr. Jaime showing normal examined terminal ileum, AVMs in the cecum and ascending colon treated with APC, moderate diverticulosis sigmoid colon, grade 2 in internal hemorrhoids, external hemorrhoids     2.  Chronic anticoagulation  -History of atrial fibrillation  -Anticoagulation will need held in order to proceed with endoscopic evaluation  -Defer overall management to admitting    3.  Comorbidities  -Oral anticoagulation/atrial fibrillation, diabetes mellitus, hyperlipidemia, hypertension, breast cancer, etc.  -Per admitting/pertinent consultants        EGD with no bleeding source identified.  Colonoscopy with AVMs treated with APC, internal and external hemorrhoids.  Mild decrease in hemoglobin likely secondary to equilibration.  If further decrease in hemoglobin, consider bleeding scan.  If hemoglobin remains stable, patient can be considered for discharge from GI POV.  Patient should follow in our office in the next 2 to 3 weeks.  Patient to call for appointment.  [phone].  Thank you for the opportunity to participate in the care of Ms. Coley.    Candido Camargo, APRN - CNP  7:12 AM  10/26/2024

## 2024-10-27 NOTE — DISCHARGE SUMMARY
Dictate [id number]  
b.i.d., aspirin 81 daily.  Lisinopril 20 mg daily.  Lutein one daily, metformin 500 b.i.d., simvastatin 20 daily, trazodone 50 mg at bedtime, vitamin D.  The patient will follow up with Dr. Lu Nevarez in 1 to 2 weeks with a TCM.  The patient will follow up with [facility] for capsule endoscopy.  Close monitoring of the hemoglobin and hematocrit should be done on outpatient basis.  The patient was treated with 3 days of IV iron replacement therapy.  This should be observed closely and follow up with associated iron transfusion as needed.  The patient will follow up with the [facility] because of prior history of breast cancer.  More than 40 minutes was spent on the day of discharge, more than 50% of the time spent face-to-face with the patient discussing plan of care and treatment at this time.          JANETH PALUMBO DO      D:  10/26/2024 16:31:46     T:  10/27/2024 01:24:49     LINDSAY/FEDE  Job #:  [id number]     Doc#:  [id number]

## 2024-10-28 ENCOUNTER — TELEPHONE (OUTPATIENT)
Dept: PRIMARY CARE CLINIC | Age: 84
End: 2024-10-28

## 2024-10-28 ENCOUNTER — CARE COORDINATION (OUTPATIENT)
Dept: CARE COORDINATION | Age: 84
End: 2024-10-28

## 2024-10-28 DIAGNOSIS — D64.9 SYMPTOMATIC ANEMIA: Primary | ICD-10-CM

## 2024-10-28 LAB
ECHO AO ASC DIAM: 2.8 CM
ECHO AO ASCENDING AORTA INDEX: 1.48 CM/M2
ECHO AV AREA PEAK VELOCITY: 1 CM2
ECHO AV AREA VTI: 1.1 CM2
ECHO AV AREA/BSA PEAK VELOCITY: 0.5 CM2/M2
ECHO AV AREA/BSA VTI: 0.6 CM2/M2
ECHO AV CUSP MM: 1 CM
ECHO AV MEAN GRADIENT: 29 MMHG
ECHO AV MEAN VELOCITY: 2.6 M/S
ECHO AV PEAK GRADIENT: 49 MMHG
ECHO AV PEAK VELOCITY: 3.5 M/S
ECHO AV VELOCITY RATIO: 0.31
ECHO AV VTI: 80.5 CM
ECHO BSA: 1.99 M2
ECHO EST RA PRESSURE: 3 MMHG
ECHO LA DIAMETER INDEX: 2.06 CM/M2
ECHO LA DIAMETER: 3.9 CM
ECHO LA VOL A-L A2C: 51 ML (ref 22–52)
ECHO LA VOL A-L A4C: 60 ML (ref 22–52)
ECHO LA VOL BP: 52 ML (ref 22–52)
ECHO LA VOL MOD A2C: 48 ML (ref 22–52)
ECHO LA VOL MOD A4C: 55 ML (ref 22–52)
ECHO LA VOL/BSA BIPLANE: 28 ML/M2 (ref 16–34)
ECHO LA VOLUME AREA LENGTH: 56 ML
ECHO LA VOLUME INDEX A-L A2C: 27 ML/M2 (ref 16–34)
ECHO LA VOLUME INDEX A-L A4C: 32 ML/M2 (ref 16–34)
ECHO LA VOLUME INDEX AREA LENGTH: 30 ML/M2 (ref 16–34)
ECHO LA VOLUME INDEX MOD A2C: 25 ML/M2 (ref 16–34)
ECHO LA VOLUME INDEX MOD A4C: 29 ML/M2 (ref 16–34)
ECHO LV EDV A2C: 109 ML
ECHO LV EDV A4C: 103 ML
ECHO LV EDV BP: 112 ML (ref 56–104)
ECHO LV EDV INDEX A4C: 54 ML/M2
ECHO LV EDV INDEX BP: 59 ML/M2
ECHO LV EDV NDEX A2C: 58 ML/M2
ECHO LV EJECTION FRACTION A2C: 70 %
ECHO LV EJECTION FRACTION A4C: 69 %
ECHO LV EJECTION FRACTION BIPLANE: 71 % (ref 55–100)
ECHO LV ESV A2C: 33 ML
ECHO LV ESV A4C: 32 ML
ECHO LV ESV BP: 32 ML (ref 19–49)
ECHO LV ESV INDEX A2C: 17 ML/M2
ECHO LV ESV INDEX A4C: 17 ML/M2
ECHO LV ESV INDEX BP: 17 ML/M2
ECHO LV FRACTIONAL SHORTENING: 36 % (ref 28–44)
ECHO LV INTERNAL DIMENSION DIASTOLE INDEX: 1.75 CM/M2
ECHO LV INTERNAL DIMENSION DIASTOLIC: 3.3 CM (ref 3.9–5.3)
ECHO LV INTERNAL DIMENSION SYSTOLIC INDEX: 1.11 CM/M2
ECHO LV INTERNAL DIMENSION SYSTOLIC: 2.1 CM
ECHO LV ISOVOLUMETRIC RELAXATION TIME (IVRT): 53.3 MS
ECHO LV IVSD: 2.2 CM (ref 0.6–0.9)
ECHO LV IVSS: 2.5 CM
ECHO LV MASS 2D: 209.9 G (ref 67–162)
ECHO LV MASS INDEX 2D: 111.1 G/M2 (ref 43–95)
ECHO LV POSTERIOR WALL DIASTOLIC: 1.1 CM (ref 0.6–0.9)
ECHO LV POSTERIOR WALL SYSTOLIC: 1.4 CM
ECHO LV RELATIVE WALL THICKNESS RATIO: 0.67
ECHO LVOT AREA: 3.5 CM2
ECHO LVOT AV VTI INDEX: 0.31
ECHO LVOT DIAM: 2.1 CM
ECHO LVOT MEAN GRADIENT: 3 MMHG
ECHO LVOT PEAK GRADIENT: 4 MMHG
ECHO LVOT PEAK VELOCITY: 1.1 M/S
ECHO LVOT STROKE VOLUME INDEX: 46 ML/M2
ECHO LVOT SV: 86.9 ML
ECHO LVOT VTI: 25.1 CM
ECHO MV "A" WAVE DURATION: 121.8 MSEC
ECHO MV A VELOCITY: 0.97 M/S
ECHO MV AREA PHT: 2.5 CM2
ECHO MV AREA VTI: 3.4 CM2
ECHO MV E DECELERATION TIME (DT): 380.2 MS
ECHO MV E VELOCITY: 0.85 M/S
ECHO MV E/A RATIO: 0.88
ECHO MV LVOT VTI INDEX: 1.02
ECHO MV MAX VELOCITY: 1 M/S
ECHO MV MEAN GRADIENT: 2 MMHG
ECHO MV MEAN VELOCITY: 0.7 M/S
ECHO MV PEAK GRADIENT: 4 MMHG
ECHO MV PRESSURE HALF TIME (PHT): 87 MS
ECHO MV VTI: 25.5 CM
ECHO PV MAX VELOCITY: 1.2 M/S
ECHO PV MEAN GRADIENT: 3 MMHG
ECHO PV MEAN VELOCITY: 0.9 M/S
ECHO PV PEAK GRADIENT: 5 MMHG
ECHO PV VTI: 28.5 CM
ECHO PVEIN A DURATION: 148.4 MS
ECHO PVEIN A VELOCITY: 0.3 M/S
ECHO PVEIN PEAK D VELOCITY: 0.4 M/S
ECHO PVEIN PEAK S VELOCITY: 0.5 M/S
ECHO PVEIN S/D RATIO: 1.3
ECHO RIGHT VENTRICULAR SYSTOLIC PRESSURE (RVSP): 9 MMHG
ECHO RV INTERNAL DIMENSION: 2.9 CM
ECHO RV LONGITUDINAL DIMENSION: 5.5 CM
ECHO RV MID DIMENSION: 2.7 CM
ECHO TV REGURGITANT MAX VELOCITY: 1.23 M/S
ECHO TV REGURGITANT PEAK GRADIENT: 6 MMHG

## 2024-10-28 PROCEDURE — 1111F DSCHRG MED/CURRENT MED MERGE: CPT | Performed by: INTERNAL MEDICINE

## 2024-10-28 NOTE — CARE COORDINATION
Care Transitions Note    Initial Call - Call within 2 business days of discharge: Yes    Patient Current Location:  Home: [address] [place] [id number]    Care Transition Nurse contacted the patient by telephone to perform post hospital discharge assessment, verified name and  as identifiers. Provided introduction to self, and explanation of the Care Transition Nurse role.     Patient: Coral Coley      Patient : 1940   MRN: [id number]      Reason for Admission: 10/23/2024 - 10/26/2024 [facility] IP. Symptomatic anemia, post scope.  Discharge Date: 10/26/24    RURS: Readmission Risk Score: 17.6  NR  CT    Hosp FU  12:00  Tylor Jaime DO (Gastroenterology) in 2 weeks (2024); Call as soon as possible     START taking:  famotidine (PEPCID)  Start taking on: 2024  STOP taking:  valACYclovir 1 g tablet (VALTREX)    Last Discharge Facility       Date Complaint Diagnosis Description Type Department Provider    10/23/24 Shortness of Breath Acute blood loss anemia ... ED to Hosp-Admission (Discharged) (ADMITTED) [facility] 4 TELE Brendan Curiel DO; Mich Arredondo...            Was this an external facility discharge? No    Additional needs identified to be addressed with provider   No needs identified             Method of communication with provider: none.    Patients top risk factors for readmission: Anemia    Interventions to address risk factors:   Report to your physician black tarry stools, maroon/red bloody stools, N/V, black coffee ground emesis, fatigue/sleepiness, weakness, racy heart beat, pallor.  Schedule your GI fu appt.  Avoid dehydration. Avoid constipation.    Care Summary Note: CTN spoke w/ Coral.    Pt reports no N/V, abd pain/cramp, or bloat. States last BM of normal brown color. Reports her \"bottom is very sore\" and she is using an ointment on rectum. No blood w/ wiping. Has/taking meds as directed. Pt is L BKA. Does not

## 2024-10-28 NOTE — TELEPHONE ENCOUNTER
Care Transitions Initial Follow Up Call    Outreach made within 2 business days of discharge: Yes    Patient: Coral Coley Patient : 1940   MRN: [id number]  Reason for Admission: Symptomatic Anemia  Discharge Date: 10/26/24       Spoke with: patient    Discharge department/facility: [facility] Interactive Patient Contact:  Was patient able to fill all prescriptions: Yes  Was patient instructed to bring all medications to the follow-up visit: Yes  Is patient taking all medications as directed in the discharge summary? Yes  Does patient understand their discharge instructions: Yes  Does patient have questions or concerns that need addressed prior to 7-14 day follow up office visit: no    Additional needs identified to be addressed with provider  No needs identified             Scheduled appointment with PCP within 7-14 days    Follow Up  Future Appointments   Date Time Provider Department Center   2024 12:00 PM Lu Nevarez MD [facility] DEP   2024  1:00 PM Lu Nevarez MD [facility] DEP       Promise Saucedo

## 2024-10-29 LAB — SURGICAL PATHOLOGY REPORT: NORMAL

## 2024-11-04 ENCOUNTER — OFFICE VISIT (OUTPATIENT)
Dept: PRIMARY CARE CLINIC | Age: 84
End: 2024-11-04

## 2024-11-04 VITALS
BODY MASS INDEX: 37.73 KG/M2 | SYSTOLIC BLOOD PRESSURE: 128 MMHG | OXYGEN SATURATION: 100 % | DIASTOLIC BLOOD PRESSURE: 84 MMHG | HEART RATE: 71 BPM | HEIGHT: 61 IN | TEMPERATURE: 97.3 F

## 2024-11-04 DIAGNOSIS — I48.0 PAROXYSMAL ATRIAL FIBRILLATION (HCC): ICD-10-CM

## 2024-11-04 DIAGNOSIS — Z23 NEED FOR VACCINATION: ICD-10-CM

## 2024-11-04 DIAGNOSIS — K92.2 GASTROINTESTINAL HEMORRHAGE, UNSPECIFIED GASTROINTESTINAL HEMORRHAGE TYPE: Primary | ICD-10-CM

## 2024-11-04 DIAGNOSIS — E78.2 HYPERLIPIDEMIA, MIXED: ICD-10-CM

## 2024-11-04 DIAGNOSIS — D50.0 ANEMIA DUE TO BLOOD LOSS, CHRONIC: ICD-10-CM

## 2024-11-04 DIAGNOSIS — F51.01 PRIMARY INSOMNIA: ICD-10-CM

## 2024-11-04 DIAGNOSIS — I10 HYPERTENSION, ESSENTIAL: ICD-10-CM

## 2024-11-04 RX ORDER — FERROUS SULFATE 325(65) MG
325 TABLET ORAL
Qty: 90 TABLET | Refills: 1 | Status: SHIPPED | OUTPATIENT
Start: 2024-11-04

## 2024-11-04 RX ORDER — LISINOPRIL 20 MG/1
20 TABLET ORAL DAILY
Qty: 90 TABLET | Refills: 0 | Status: SHIPPED | OUTPATIENT
Start: 2024-11-04

## 2024-11-04 RX ORDER — TRAZODONE HYDROCHLORIDE 50 MG/1
50 TABLET, FILM COATED ORAL NIGHTLY
Qty: 90 TABLET | Refills: 0 | Status: CANCELLED | OUTPATIENT
Start: 2024-11-04

## 2024-11-04 RX ORDER — AMLODIPINE BESYLATE 5 MG/1
7.5 TABLET ORAL DAILY
Qty: 135 TABLET | Refills: 0 | Status: SHIPPED | OUTPATIENT
Start: 2024-11-04

## 2024-11-04 RX ORDER — SIMVASTATIN 20 MG
20 TABLET ORAL NIGHTLY
Qty: 90 TABLET | Refills: 0 | Status: SHIPPED | OUTPATIENT
Start: 2024-11-04 | End: 2025-02-02

## 2024-11-04 RX ORDER — FAMOTIDINE 20 MG/1
20 TABLET, FILM COATED ORAL DAILY
Qty: 90 TABLET | Refills: 0 | Status: SHIPPED | OUTPATIENT
Start: 2024-11-04

## 2024-11-05 ENCOUNTER — CARE COORDINATION (OUTPATIENT)
Dept: CARE COORDINATION | Age: 84
End: 2024-11-05

## 2024-11-05 NOTE — CARE COORDINATION
Care Transitions Note    Follow Up Call     CTN attempted outreach leaving HIPAA VM, purpose of call, my contact for subsequent outreach. Attended PCP appt (stable BR/HR/Sats). To start iron replacement. Pending capsule endoscopy.     Reason for Admission: 10/23/2024 - 10/26/2024 [facility] IP. Symptomatic anemia (transfused), post scope (2 AVMs)    Follow Up Appointment:     Future Appointments         Provider Specialty Dept Phone    12/13/2024 1:00 PM Lu Nevarez MD Primary Care [phone]    12/13/2024 2:40 PM Lu Nevarez MD Primary Care [phone]            Lakia Beckman RN

## 2024-11-12 ENCOUNTER — CARE COORDINATION (OUTPATIENT)
Dept: CARE COORDINATION | Age: 84
End: 2024-11-12

## 2024-11-12 NOTE — CARE COORDINATION
Care Transitions Note    Follow Up Call     Reason for Admission: 10/23/2024 - 10/26/2024 [facility] IP. Symptomatic anemia (transfused), post scope (2 AVMs)     Patient Current Location:  Home: [address] [place] [id number]    Care Transition Nurse contacted the patient by telephone. Verified name and  as identifiers.    Additional needs identified to be addressed with provider   No needs identified                 Method of communication with provider: none.    Care Summary Note: CTN spoke w/ Coral. Pt reports she is feeling well. No dizziness, weakness, blood in sputum, cough, congestion, sob. Pt states she is tolerating her diet well. She has GI appt . She will get her CMP and CBC w/ diff on Monday. Will review pathology at GI appt. Attended PCP appt (stable BR/HR/Sats) on . Started iron replacement. Pending capsule endoscopy.     Care Transition Nurse reviewed red flags with patient. The patient was given an opportunity to ask questions; all questions answered at this time.. The patient verbalized understanding.   Were discharge instructions available to patient? Yes.   Reviewed appropriate site of care based on symptoms and resources available to patient including: PCP  Specialist  Urgent care clinics  When to call 911  Condition related references. The patient agrees to contact the primary care provider and/or specialist office for questions related to their healthcare.      Assessments:  Care Transitions Subsequent and Final Call    Subsequent and Final Calls  Do you have any ongoing symptoms?: No  Have your medications changed?: No  Do you have any questions related to your medications?: No  Do you currently have any active services?: No  Do you have any needs or concerns that I can assist you with?: No  Identified Barriers: Lack of Education  Care Transitions Interventions  Other Interventions:              Follow Up Appointment:     Future

## 2024-11-20 ENCOUNTER — CARE COORDINATION (OUTPATIENT)
Dept: CARE COORDINATION | Age: 84
End: 2024-11-20

## 2024-11-20 DIAGNOSIS — I10 HYPERTENSION, ESSENTIAL: ICD-10-CM

## 2024-11-20 DIAGNOSIS — K92.2 GASTROINTESTINAL HEMORRHAGE, UNSPECIFIED GASTROINTESTINAL HEMORRHAGE TYPE: ICD-10-CM

## 2024-11-20 LAB
ALBUMIN: 3.6 G/DL (ref 3.5–5.2)
ALP BLD-CCNC: 81 U/L (ref 35–104)
ALT SERPL-CCNC: 9 U/L (ref 0–32)
ANION GAP SERPL CALCULATED.3IONS-SCNC: 9 MMOL/L (ref 7–16)
AST SERPL-CCNC: 20 U/L (ref 0–31)
BASOPHILS # BLD: 0 K/UL (ref 0–0.2)
BASOPHILS NFR BLD: 0 % (ref 0–2)
BILIRUB SERPL-MCNC: 0.3 MG/DL (ref 0–1.2)
BUN BLDV-MCNC: 13 MG/DL (ref 6–23)
CALCIUM SERPL-MCNC: 8.8 MG/DL (ref 8.6–10.2)
CHLORIDE BLD-SCNC: 102 MMOL/L (ref 98–107)
CO2: 27 MMOL/L (ref 22–29)
CREAT SERPL-MCNC: 0.6 MG/DL (ref 0.5–1)
EOSINOPHIL # BLD: 0 K/UL (ref 0.05–0.5)
EOSINOPHILS RELATIVE PERCENT: 0 % (ref 0–6)
ERYTHROCYTE [DISTWIDTH] IN BLOOD BY AUTOMATED COUNT: ABNORMAL % (ref 11.5–15)
FOLATE SERPL-MCNC: 14.5 NG/ML (ref 4.8–24.2)
GFR, ESTIMATED: 88 ML/MIN/1.73M2
GLUCOSE BLD-MCNC: 126 MG/DL (ref 74–99)
HCT VFR BLD AUTO: 37.9 % (ref 34–48)
HGB BLD-MCNC: 11 G/DL (ref 11.5–15.5)
LYMPHOCYTES NFR BLD: 2.34 K/UL (ref 1.5–4)
LYMPHOCYTES RELATIVE PERCENT: 29 % (ref 20–42)
MCH RBC QN AUTO: 23.7 PG (ref 26–35)
MCHC RBC AUTO-ENTMCNC: 29 G/DL (ref 32–34.5)
MCV RBC AUTO: 81.7 FL (ref 80–99.9)
MONOCYTES NFR BLD: 0.28 K/UL (ref 0.1–0.95)
MONOCYTES NFR BLD: 4 % (ref 2–12)
NEUTROPHILS NFR BLD: 68 % (ref 43–80)
NEUTS SEG NFR BLD: 5.47 K/UL (ref 1.8–7.3)
PLATELET, FLUORESCENCE: 337 K/UL (ref 130–450)
PMV BLD AUTO: 10.6 FL (ref 7–12)
POTASSIUM SERPL-SCNC: 4.4 MMOL/L (ref 3.5–5)
RBC # BLD AUTO: 4.64 M/UL (ref 3.5–5.5)
RBC # BLD: ABNORMAL 10*6/UL
SODIUM BLD-SCNC: 138 MMOL/L (ref 132–146)
TOTAL PROTEIN: 7 G/DL (ref 6.4–8.3)
VIT B12 SERPL-MCNC: 427 PG/ML (ref 211–946)
WBC OTHER # BLD: 8.1 K/UL (ref 4.5–11.5)

## 2024-11-20 NOTE — CARE COORDINATION
Care Transitions Note    Follow Up Call     CTN attempted subsequent outreach leaving HIPAA VM, purpose of call, my contact info.    Reason for Admission: 10/23/2024 - 10/26/2024 [facility] IP. Symptomatic anemia (transfused), post scope (2 AVMs)     Follow Up Appointment:     Future Appointments         Provider Specialty Dept Phone    12/13/2024 1:00 PM Lu Nevarez MD Primary Care [phone]    12/13/2024 2:40 PM Lu Nevarez MD [facility] Care [phone]            Lkaia Beckman RN

## 2024-11-21 LAB
FERRITIN SERPL-MCNC: 48 NG/ML
IMM RETICS NFR: NORMAL % (ref 2.7–18.3)
IRON SERPL-MCNC: 44 UG/DL (ref 37–145)
RETIC HEMOGLOBIN: NORMAL PG (ref 28.2–35.7)
RETICS # AUTO: NORMAL M/UL (ref 0.03–0.08)
RETICS/RBC NFR AUTO: NORMAL % (ref 0.5–1.9)
RETICS/RBC NFR MANUAL: 1.5 % (ref 0.5–1.5)

## 2024-11-26 ENCOUNTER — CARE COORDINATION (OUTPATIENT)
Dept: CARE COORDINATION | Age: 84
End: 2024-11-26

## 2024-11-26 NOTE — CARE COORDINATION
Care Transitions Note    Follow Up Call     CTN left HIPAA VM, purpose of outreach, and my contact info for second final outreach. CTN s/o.    Reason for Admission: 10/23/2024 - 10/26/2024 [facility] IP. Symptomatic anemia (transfused), post scope (2 AVMs)       Follow Up Appointment:     Future Appointments         Provider Specialty Dept Phone    1/15/2025 1:40 PM Lu Nevarez MD Primary Care [phone]                Lakia Beckman RN

## 2024-12-12 LAB — MAMMOGRAPHY, EXTERNAL: NORMAL

## 2024-12-30 ENCOUNTER — APPOINTMENT (OUTPATIENT)
Dept: ULTRASOUND IMAGING | Age: 84
End: 2024-12-30
Payer: MEDICARE

## 2024-12-30 ENCOUNTER — HOSPITAL ENCOUNTER (EMERGENCY)
Age: 84
Discharge: HOME | End: 2024-12-30
Payer: MEDICARE

## 2024-12-30 ENCOUNTER — APPOINTMENT (OUTPATIENT)
Dept: GENERAL RADIOLOGY | Age: 84
End: 2024-12-30
Payer: MEDICARE

## 2024-12-30 VITALS
BODY MASS INDEX: 37.3 KG/M2 | SYSTOLIC BLOOD PRESSURE: 155 MMHG | HEIGHT: 60 IN | OXYGEN SATURATION: 98 % | TEMPERATURE: 97.2 F | DIASTOLIC BLOOD PRESSURE: 77 MMHG | WEIGHT: 190 LBS | RESPIRATION RATE: 20 BRPM | HEART RATE: 93 BPM

## 2024-12-30 DIAGNOSIS — M79.605 LEFT LEG PAIN: Primary | ICD-10-CM

## 2024-12-30 PROCEDURE — 73552 X-RAY EXAM OF FEMUR 2/>: CPT

## 2024-12-30 PROCEDURE — 93971 EXTREMITY STUDY: CPT

## 2024-12-30 PROCEDURE — 73502 X-RAY EXAM HIP UNI 2-3 VIEWS: CPT

## 2024-12-30 PROCEDURE — 6370000000 HC RX 637 (ALT 250 FOR IP): Performed by: PHYSICIAN ASSISTANT

## 2024-12-30 PROCEDURE — 99284 EMERGENCY DEPT VISIT MOD MDM: CPT

## 2024-12-30 RX ORDER — HYDROCODONE BITARTRATE AND ACETAMINOPHEN 5; 325 MG/1; MG/1
1 TABLET ORAL ONCE
Status: COMPLETED | OUTPATIENT
Start: 2024-12-30 | End: 2024-12-30

## 2024-12-30 RX ORDER — HYDROCODONE BITARTRATE AND ACETAMINOPHEN 5; 325 MG/1; MG/1
1 TABLET ORAL EVERY 6 HOURS PRN
Qty: 12 TABLET | Refills: 0 | Status: SHIPPED | OUTPATIENT
Start: 2024-12-30 | End: 2025-01-02

## 2024-12-30 RX ADMIN — HYDROCODONE BITARTRATE AND ACETAMINOPHEN 1 TABLET: 5; 325 TABLET ORAL at 11:56

## 2024-12-30 ASSESSMENT — PAIN DESCRIPTION - FREQUENCY: FREQUENCY: CONTINUOUS

## 2024-12-30 ASSESSMENT — PAIN DESCRIPTION - ORIENTATION
ORIENTATION: LEFT
ORIENTATION: LEFT

## 2024-12-30 ASSESSMENT — PAIN DESCRIPTION - DESCRIPTORS: DESCRIPTORS: TIGHTNESS

## 2024-12-30 ASSESSMENT — PAIN - FUNCTIONAL ASSESSMENT
PAIN_FUNCTIONAL_ASSESSMENT: 0-10
PAIN_FUNCTIONAL_ASSESSMENT: PREVENTS OR INTERFERES SOME ACTIVE ACTIVITIES AND ADLS

## 2024-12-30 ASSESSMENT — PAIN DESCRIPTION - LOCATION
LOCATION: LEG
LOCATION: LEG

## 2024-12-30 ASSESSMENT — PAIN SCALES - GENERAL
PAINLEVEL_OUTOF10: 8
PAINLEVEL_OUTOF10: 10

## 2024-12-30 ASSESSMENT — PAIN DESCRIPTION - PAIN TYPE: TYPE: ACUTE PAIN

## 2024-12-30 NOTE — ED PROVIDER NOTES
Independent ELISEO Visit.      HPI:  12/30/24, Time: 10:35 AM CONNER Coley is a 84 y.o. female presenting to the ED for Left leg pain , beginning  8 days ago.  The complaint has been persistent, severe in severity, and worsened by movement of leg..  Patient comes in with complaint of left upper leg pain.  She states she fell 8 days ago and then reinjured the leg bumping into something with that yesterday.  She states she gets worsening pain with movement of the left upper leg hip area.  She denies any back pain.  Patient denies any head injury loss of consciousness no neck pain.  Patient has history of leg amputation on the left below the knee  Review of Systems:   A complete review of systems was performed and pertinent positives and negatives are stated within HPI, all other systems reviewed and are negative.          --------------------------------------------- PAST HISTORY ---------------------------------------------  Past Medical History:  has a past medical history of Atrial fibrillation (HCC), Below knee amputation (HCC), Cancer (HCC), Carotid stenosis, right, Diabetes mellitus (HCC), Heart murmur, Hx of radiation therapy, Hyperlipidemia, Hypertension, Major depressive disorder, Osteopenia, Osteoporosis, PONV (postoperative nausea and vomiting), Syncope, TIA (transient ischemic attack), and Transient alteration of awareness.    Past Surgical History:  has a past surgical history that includes amputation (Left, 1992); Breast surgery (Right, 2004); Colonoscopy; Hysterectomy; IR CAROTID STENT W PROTECTION (01/22/2021); Rotator cuff repair; Cholecystectomy; Upper gastrointestinal endoscopy (N/A, 10/25/2024); and Colonoscopy (N/A, 10/25/2024).    Social History:  reports that she quit smoking about 43 years ago. Her smoking use included cigarettes. She has never used smokeless tobacco. She reports current alcohol use. She reports that she does not use drugs.    Family History: family history is

## 2025-02-03 ENCOUNTER — HOSPITAL ENCOUNTER (OUTPATIENT)
Dept: GENERAL RADIOLOGY | Age: 85
Discharge: HOME | End: 2025-02-05
Payer: MEDICARE

## 2025-02-03 ENCOUNTER — HOSPITAL ENCOUNTER (OUTPATIENT)
Age: 85
Discharge: HOME | End: 2025-02-05
Payer: MEDICARE

## 2025-02-03 DIAGNOSIS — T18.4XXA FOREIGN BODY IN COLON, INITIAL ENCOUNTER: ICD-10-CM

## 2025-02-03 PROCEDURE — 74018 RADEX ABDOMEN 1 VIEW: CPT

## 2025-02-04 DIAGNOSIS — E78.2 HYPERLIPIDEMIA, MIXED: ICD-10-CM

## 2025-02-04 DIAGNOSIS — I48.0 PAROXYSMAL ATRIAL FIBRILLATION (HCC): ICD-10-CM

## 2025-02-04 DIAGNOSIS — I10 HYPERTENSION, ESSENTIAL: ICD-10-CM

## 2025-02-04 RX ORDER — LISINOPRIL 20 MG/1
20 TABLET ORAL DAILY
Qty: 90 TABLET | Refills: 0 | Status: SHIPPED
Start: 2025-02-04 | End: 2025-02-04 | Stop reason: SDUPTHER

## 2025-02-04 RX ORDER — FAMOTIDINE 20 MG/1
20 TABLET, FILM COATED ORAL DAILY
Qty: 90 TABLET | Refills: 0 | Status: SHIPPED
Start: 2025-02-04 | End: 2025-02-04 | Stop reason: SDUPTHER

## 2025-02-04 RX ORDER — FAMOTIDINE 20 MG/1
20 TABLET, FILM COATED ORAL DAILY
Qty: 90 TABLET | Refills: 0 | Status: SHIPPED | OUTPATIENT
Start: 2025-02-04

## 2025-02-04 RX ORDER — SIMVASTATIN 20 MG
20 TABLET ORAL NIGHTLY
Qty: 90 TABLET | Refills: 0 | Status: SHIPPED
Start: 2025-02-04 | End: 2025-02-04 | Stop reason: SDUPTHER

## 2025-02-04 RX ORDER — AMLODIPINE BESYLATE 5 MG/1
7.5 TABLET ORAL DAILY
Qty: 135 TABLET | Refills: 0 | Status: SHIPPED | OUTPATIENT
Start: 2025-02-04

## 2025-02-04 RX ORDER — SIMVASTATIN 20 MG
20 TABLET ORAL NIGHTLY
Qty: 90 TABLET | Refills: 0 | Status: SHIPPED | OUTPATIENT
Start: 2025-02-04 | End: 2025-05-05

## 2025-02-04 RX ORDER — LISINOPRIL 20 MG/1
20 TABLET ORAL DAILY
Qty: 90 TABLET | Refills: 0 | Status: SHIPPED | OUTPATIENT
Start: 2025-02-04

## 2025-02-04 RX ORDER — AMLODIPINE BESYLATE 5 MG/1
7.5 TABLET ORAL DAILY
Qty: 135 TABLET | Refills: 0 | Status: SHIPPED
Start: 2025-02-04 | End: 2025-02-04 | Stop reason: SDUPTHER

## 2025-02-04 NOTE — TELEPHONE ENCOUNTER
Name of Medication(s) Requested:  Requested Prescriptions     Pending Prescriptions Disp Refills    lisinopril (PRINIVIL;ZESTRIL) 20 MG tablet 90 tablet 0     Sig: Take 1 tablet by mouth daily    amLODIPine (NORVASC) 5 MG tablet 135 tablet 0     Sig: Take 1.5 tablets by mouth daily    apixaban (ELIQUIS) 5 MG TABS tablet 180 tablet 0     Sig: Take 1 tablet by mouth 2 times daily    famotidine (PEPCID) 20 MG tablet 90 tablet 0     Sig: Take 1 tablet by mouth daily    metFORMIN (GLUCOPHAGE) 500 MG tablet 180 tablet 0     Sig: Take 1 tablet by mouth 2 times daily (with meals)    simvastatin (ZOCOR) 20 MG tablet 90 tablet 0     Sig: Take 1 tablet by mouth nightly       Medication is on current medication list Yes    Dosage and directions were verified? Yes    Quantity verified: 90 day supply     Pharmacy Verified?  Yes    Last Appointment:  11/4/2024    Future appts:  No future appointments.     (If no appt send self scheduling link. .REFILLAPPT)  Scheduling request sent?     [] Yes  [x] No    Does patient need updated?  [] Yes  [x] No

## 2025-02-28 ENCOUNTER — HOSPITAL ENCOUNTER (OUTPATIENT)
Age: 85
Discharge: HOME | End: 2025-02-28
Payer: MEDICARE

## 2025-02-28 LAB
ALBUMIN SERPL-MCNC: 3.6 G/DL (ref 3.5–5.2)
ALP SERPL-CCNC: 103 U/L (ref 35–104)
ALT SERPL-CCNC: 11 U/L (ref 0–32)
ANION GAP SERPL CALCULATED.3IONS-SCNC: 9 MMOL/L (ref 7–16)
AST SERPL-CCNC: 16 U/L (ref 0–31)
BASOPHILS # BLD: 0.04 K/UL (ref 0–0.2)
BASOPHILS NFR BLD: 1 % (ref 0–2)
BILIRUB SERPL-MCNC: 0.3 MG/DL (ref 0–1.2)
BUN SERPL-MCNC: 17 MG/DL (ref 6–23)
CALCIUM SERPL-MCNC: 9.4 MG/DL (ref 8.6–10.2)
CHLORIDE SERPL-SCNC: 104 MMOL/L (ref 98–107)
CO2 SERPL-SCNC: 27 MMOL/L (ref 22–29)
CREAT SERPL-MCNC: 0.6 MG/DL (ref 0.5–1)
EOSINOPHIL # BLD: 0.32 K/UL (ref 0.05–0.5)
EOSINOPHILS RELATIVE PERCENT: 5 % (ref 0–6)
ERYTHROCYTE [DISTWIDTH] IN BLOOD BY AUTOMATED COUNT: 13.4 % (ref 11.5–15)
FERRITIN SERPL-MCNC: 50 NG/ML
FOLATE SERPL-MCNC: 12.3 NG/ML (ref 4.8–24.2)
GFR, ESTIMATED: 87 ML/MIN/1.73M2
GLUCOSE SERPL-MCNC: 163 MG/DL (ref 74–99)
HCT VFR BLD AUTO: 38.8 % (ref 34–48)
HGB BLD-MCNC: 12.2 G/DL (ref 11.5–15.5)
IMM GRANULOCYTES # BLD AUTO: 0.03 K/UL (ref 0–0.58)
IMM GRANULOCYTES NFR BLD: 0 % (ref 0–5)
IMM RETICS NFR: 12.5 % (ref 3–15.9)
IRON SATN MFR SERPL: 55 % (ref 15–50)
IRON SERPL-MCNC: 207 UG/DL (ref 37–145)
LYMPHOCYTES NFR BLD: 2.13 K/UL (ref 1.5–4)
LYMPHOCYTES RELATIVE PERCENT: 30 % (ref 20–42)
MCH RBC QN AUTO: 27.8 PG (ref 26–35)
MCHC RBC AUTO-ENTMCNC: 31.4 G/DL (ref 32–34.5)
MCV RBC AUTO: 88.4 FL (ref 80–99.9)
MONOCYTES NFR BLD: 0.57 K/UL (ref 0.1–0.95)
MONOCYTES NFR BLD: 8 % (ref 2–12)
NEUTROPHILS NFR BLD: 57 % (ref 43–80)
NEUTS SEG NFR BLD: 4.06 K/UL (ref 1.8–7.3)
PLATELET # BLD AUTO: 349 K/UL (ref 130–450)
PMV BLD AUTO: 10.8 FL (ref 7–12)
POTASSIUM SERPL-SCNC: 4.4 MMOL/L (ref 3.5–5)
PROT SERPL-MCNC: 7.4 G/DL (ref 6.4–8.3)
RBC # BLD AUTO: 4.39 M/UL (ref 3.5–5.5)
RETIC HEMOGLOBIN: 31.4 PG (ref 28.2–36.6)
RETICS # AUTO: 0.07 M/UL
RETICS/RBC NFR AUTO: 1.7 % (ref 0.4–1.9)
SODIUM SERPL-SCNC: 140 MMOL/L (ref 132–146)
TIBC SERPL-MCNC: 373 UG/DL (ref 250–450)
VIT B12 SERPL-MCNC: 336 PG/ML (ref 211–946)
WBC OTHER # BLD: 7.2 K/UL (ref 4.5–11.5)

## 2025-02-28 PROCEDURE — 82728 ASSAY OF FERRITIN: CPT

## 2025-02-28 PROCEDURE — 85025 COMPLETE CBC W/AUTO DIFF WBC: CPT

## 2025-02-28 PROCEDURE — 36415 COLL VENOUS BLD VENIPUNCTURE: CPT

## 2025-02-28 PROCEDURE — 83550 IRON BINDING TEST: CPT

## 2025-02-28 PROCEDURE — 82746 ASSAY OF FOLIC ACID SERUM: CPT

## 2025-02-28 PROCEDURE — 80053 COMPREHEN METABOLIC PANEL: CPT

## 2025-02-28 PROCEDURE — 82607 VITAMIN B-12: CPT

## 2025-02-28 PROCEDURE — 83540 ASSAY OF IRON: CPT

## 2025-02-28 PROCEDURE — 85045 AUTOMATED RETICULOCYTE COUNT: CPT

## 2025-03-20 DIAGNOSIS — E78.2 HYPERLIPIDEMIA, MIXED: ICD-10-CM

## 2025-03-24 ENCOUNTER — TELEPHONE (OUTPATIENT)
Dept: PRIMARY CARE CLINIC | Age: 85
End: 2025-03-24

## 2025-03-24 RX ORDER — SIMVASTATIN 20 MG
20 TABLET ORAL NIGHTLY
Qty: 90 TABLET | Refills: 1 | Status: SHIPPED | OUTPATIENT
Start: 2025-03-24 | End: 2025-06-22

## 2025-03-24 NOTE — TELEPHONE ENCOUNTER
Name of Medication(s) Requested:  Requested Prescriptions     Pending Prescriptions Disp Refills    simvastatin (ZOCOR) 20 MG tablet 90 tablet 1     Sig: Take 1 tablet by mouth nightly       Medication is on current medication list Yes    Dosage and directions were verified? Yes    Quantity verified: 90 day supply     Pharmacy Verified?  Yes    Last Appointment:  Visit date not found    Future appts:  No future appointments.     (If no appt send self scheduling link. .REFILLAPPT)  Scheduling request sent?     [] Yes  [] No    Does patient need updated?  [] Yes  [x] No

## 2025-04-28 DIAGNOSIS — I10 HYPERTENSION, ESSENTIAL: ICD-10-CM

## 2025-04-28 RX ORDER — KETOCONAZOLE 20 MG/G
CREAM TOPICAL
Qty: 30 G | Refills: 1 | Status: SHIPPED | OUTPATIENT
Start: 2025-04-28

## 2025-04-28 RX ORDER — LISINOPRIL 20 MG/1
20 TABLET ORAL DAILY
Qty: 90 TABLET | Refills: 1 | Status: SHIPPED | OUTPATIENT
Start: 2025-04-28

## 2025-04-28 RX ORDER — KETOCONAZOLE 20 MG/G
CREAM TOPICAL
COMMUNITY
Start: 2025-03-05 | End: 2025-04-28 | Stop reason: SDUPTHER

## 2025-04-28 NOTE — TELEPHONE ENCOUNTER
Name of Medication(s) Requested:  Requested Prescriptions     Pending Prescriptions Disp Refills    ketoconazole (NIZORAL) 2 % cream 30 g 1     Sig: Apply topically daily.       Medication is on current medication list Yes    Dosage and directions were verified? Yes    Quantity verified: 30 day supply     Pharmacy Verified?  Yes    Last Appointment:  11/4/2024    Future appts:  No future appointments.     (If no appt send self scheduling link. .REFILLAPPT)  Scheduling request sent?     [] Yes  [] No    Does patient need updated?  [] Yes  [] No

## 2025-05-02 DIAGNOSIS — I10 HYPERTENSION, ESSENTIAL: ICD-10-CM

## 2025-05-02 RX ORDER — AMLODIPINE BESYLATE 5 MG/1
7.5 TABLET ORAL DAILY
Qty: 135 TABLET | Refills: 0 | OUTPATIENT
Start: 2025-05-02

## 2025-05-05 DIAGNOSIS — I10 HYPERTENSION, ESSENTIAL: ICD-10-CM

## 2025-05-05 NOTE — TELEPHONE ENCOUNTER
Name of Medication(s) Requested:  Requested Prescriptions     Pending Prescriptions Disp Refills    amLODIPine (NORVASC) 5 MG tablet 135 tablet 1     Sig: Take 1.5 tablets by mouth daily       Medication is on current medication list Yes    Dosage and directions were verified? Yes    Quantity verified: 90 day supply     Pharmacy Verified?  Yes    Last Appointment:  11/4/2024    Future appts:  No future appointments.     (If no appt send self scheduling link. .REFILLAPPT)  Scheduling request sent?     [] Yes  [] No    Does patient need updated?  [] Yes  [] No

## 2025-05-06 DIAGNOSIS — D50.0 ANEMIA DUE TO BLOOD LOSS, CHRONIC: ICD-10-CM

## 2025-05-06 DIAGNOSIS — K92.2 GASTROINTESTINAL HEMORRHAGE, UNSPECIFIED GASTROINTESTINAL HEMORRHAGE TYPE: ICD-10-CM

## 2025-05-06 RX ORDER — AMLODIPINE BESYLATE 5 MG/1
7.5 TABLET ORAL DAILY
Qty: 135 TABLET | Refills: 1 | Status: SHIPPED | OUTPATIENT
Start: 2025-05-06

## 2025-05-06 NOTE — TELEPHONE ENCOUNTER
Name of Medication(s) Requested:  Requested Prescriptions     Pending Prescriptions Disp Refills    Ferrous Sulfate (IRON) 325 (65 Fe) MG TABS [Pharmacy Med Name: Iron Oral Tablet 325 (65 Fe) MG] 90 tablet 1     Sig: TAKE ONE TABLET BY MOUTH DAILY with breakfast       Medication is on current medication list Yes    Dosage and directions were verified? Yes    Quantity verified: 90 day supply     Pharmacy Verified?  Yes    Last Appointment:  11/4/2024    Future appts:  No future appointments.     (If no appt send self scheduling link. .REFILLAPPT)  Scheduling request sent?     [] Yes  [] No    Does patient need updated?  [] Yes  [] No

## 2025-05-07 RX ORDER — PNV NO.95/FERROUS FUM/FOLIC AC 28MG-0.8MG
1 TABLET ORAL
Qty: 90 TABLET | Refills: 1 | Status: SHIPPED | OUTPATIENT
Start: 2025-05-07

## 2025-06-17 ENCOUNTER — TELEPHONE (OUTPATIENT)
Dept: PHARMACY | Facility: CLINIC | Age: 85
End: 2025-06-17

## 2025-06-17 NOTE — TELEPHONE ENCOUNTER
[facility] CLINICAL [facility]: ADHERENCE REVIEW  Identified care gap per [place]: fills at [facility]: Diabetes adherence    Patient also appears to be prescribed: ACE/ARB and Statin    ASSESSMENT    ACE/ARB ADHERENCE    Insurance Records claims through 6/10/25 (Prior Year PDC = not reported; YTD PDC = 100%; Potential Fail Date: 10/2/25):   Lisinopril 20 mg tablets last filled on 4/28/25 for 90 day supply. Next refill due: 7/27/25  One refill remaining    BP Readings from Last 3 Encounters:   12/30/24 (!) 155/77   11/04/24 128/84   10/26/24 (!) 115/54     Estimated Creatinine Clearance: 66 mL/min (based on SCr of 0.6 mg/dL).  Lab Results   Component Value Date    CREATININE 0.6 02/28/2025     Lab Results   Component Value Date    K 4.4 02/28/2025     DIABETES ADHERENCE    Insurance Records claims through 6/10/25 (Prior Year PDC = not reported; YTD PDC = FIRST FILL; Potential Fail Date: 7/10/25):   Metformin 500 mg tablets last filled on 2/4/25 for 90 day supply. Next refill due: 5/5/25  No refills remaining    Lab Results   Component Value Date    LABA1C 6.0 (H) 10/25/2024    LABA1C 7.1 06/27/2024    LABA1C 7.7 01/11/2024     NOTE: A1c not yet completed this calendar year    STATIN ADHERENCE    Insurance Records claims through 6/10/25 (Prior Year PDC = not reported; YTD PDC = FIRST FILL; Potential Fail Date: 8/17/25):   Simvastatin 20 mg tablets last filled on 3/24/25 for 90 day supply. Next refill due: 6/22/25  One refill remaining    Lab Results   Component Value Date    CHOL 106 10/24/2024    TRIG 69 10/24/2024    HDL 52 10/24/2024     Lab Results   Component Value Date    LDL 40 10/24/2024      ALT   Date Value Ref Range Status   02/28/2025 11 0 - 32 U/L Final     AST   Date Value Ref Range Status   02/28/2025 16 0 - 31 U/L Final     The ASCVD Risk score (Kwaku DK, et al., 2019) failed to calculate for the following reasons:    The 2019 ASCVD risk score is only valid for ages 40 to 79     PLAN  The

## 2025-07-18 ENCOUNTER — TRANSCRIBE ORDERS (OUTPATIENT)
Dept: ADMINISTRATIVE | Age: 85
End: 2025-07-18

## 2025-07-18 DIAGNOSIS — G40.909 NONINTRACTABLE EPILEPSY WITHOUT STATUS EPILEPTICUS, UNSPECIFIED EPILEPSY TYPE (HCC): ICD-10-CM

## 2025-07-18 DIAGNOSIS — I60.9 SAH (SUBARACHNOID HEMORRHAGE) (HCC): Primary | ICD-10-CM

## 2025-08-06 ENCOUNTER — HOSPITAL ENCOUNTER (OUTPATIENT)
Dept: NEUROLOGY | Age: 85
Discharge: HOME | End: 2025-08-06
Payer: MEDICARE

## 2025-08-06 DIAGNOSIS — I60.9 SAH (SUBARACHNOID HEMORRHAGE) (HCC): ICD-10-CM

## 2025-08-06 DIAGNOSIS — G40.909 NONINTRACTABLE EPILEPSY WITHOUT STATUS EPILEPTICUS, UNSPECIFIED EPILEPSY TYPE (HCC): ICD-10-CM

## 2025-08-06 PROCEDURE — 95816 EEG AWAKE AND DROWSY: CPT

## 2025-08-06 PROCEDURE — 95816 EEG AWAKE AND DROWSY: CPT | Performed by: PSYCHIATRY & NEUROLOGY

## 2025-09-03 ENCOUNTER — HOSPITAL ENCOUNTER (OUTPATIENT)
Age: 85
Discharge: HOME | End: 2025-09-05
Payer: MEDICARE

## 2025-09-03 DIAGNOSIS — I48.11 LONGSTANDING PERSISTENT ATRIAL FIBRILLATION (HCC): ICD-10-CM

## 2025-09-03 PROCEDURE — 93225 XTRNL ECG REC<48 HRS REC: CPT

## 2025-09-05 ENCOUNTER — HOSPITAL ENCOUNTER (OUTPATIENT)
Dept: GENERAL RADIOLOGY | Age: 85
End: 2025-09-05
Payer: MEDICARE

## 2025-09-05 ENCOUNTER — HOSPITAL ENCOUNTER (OUTPATIENT)
Age: 85
Discharge: HOME | End: 2025-09-05
Payer: MEDICARE

## 2025-09-05 DIAGNOSIS — R29.6 FALLS: ICD-10-CM

## 2025-09-05 DIAGNOSIS — R10.9 FLANK PAIN: ICD-10-CM

## 2025-09-05 PROCEDURE — 71101 X-RAY EXAM UNILAT RIBS/CHEST: CPT

## (undated) DEVICE — CONTAINER SPEC COLL 960ML POLYPR TRIANG GRAD INTAKE/OUTPUT

## (undated) DEVICE — ELECTRODE PT RET AD L9FT HI MOIST COND ADH HYDRGEL CORDED

## (undated) DEVICE — AIR/WATER CLEANING ADAPTER FOR OLYMPUS® GI ENDOSCOPE: Brand: BULLDOG®

## (undated) DEVICE — FIAPC® PROBE W/ FILTER 2200 A OD 2.3MM/6.9FR; L 2.2M/7.2FT: Brand: ERBE

## (undated) DEVICE — KIT BEDSIDE REVITAL OX 500ML

## (undated) DEVICE — WIPES SKIN CLOTH READYPREP 9 X 10.5 IN 2% CHLORHEX GLUCONATE CHG PREOP

## (undated) DEVICE — FORCEPS BX L240CM JAW DIA2.8MM L CAP W/ NDL MIC MESH TOOTH

## (undated) DEVICE — TOWEL,OR,DSP,ST,BLUE,STD,6/PK,12PK/CS: Brand: MEDLINE

## (undated) DEVICE — TUBING, SUCTION, 1/4" X 10', STRAIGHT: Brand: MEDLINE

## (undated) DEVICE — 4-PORT MANIFOLD: Brand: NEPTUNE 2

## (undated) DEVICE — COVER,LIGHT HANDLE,FLX,1/PK: Brand: MEDLINE INDUSTRIES, INC.

## (undated) DEVICE — JELLY,LUBE,STERILE,FLIP TOP,TUBE,4-OZ: Brand: MEDLINE

## (undated) DEVICE — BLOCK BITE 60FR CAREGUARD

## (undated) DEVICE — SPONGE GZ 4IN 4IN 4 PLY N WVN AVANT

## (undated) DEVICE — Device: Brand: DEFENDO VALVE AND CONNECTOR KIT

## (undated) DEVICE — KENDALL 450 SERIES MONITORING FOAM ELECTRODE - RECTANGULAR SHAPE ( 3/PK): Brand: KENDALL

## (undated) DEVICE — BAG SPECIMEN BIOHAZARD 6IN X 9IN

## (undated) DEVICE — YANKAUER,BULB TIP,W/O VENT,RIGID,STERILE: Brand: MEDLINE